# Patient Record
Sex: MALE | Race: WHITE | Employment: OTHER | URBAN - METROPOLITAN AREA
[De-identification: names, ages, dates, MRNs, and addresses within clinical notes are randomized per-mention and may not be internally consistent; named-entity substitution may affect disease eponyms.]

---

## 2017-05-11 ENCOUNTER — OFFICE VISIT (OUTPATIENT)
Dept: NEUROLOGY | Age: 82
End: 2017-05-11

## 2017-05-11 VITALS
HEIGHT: 68 IN | HEART RATE: 60 BPM | WEIGHT: 153 LBS | RESPIRATION RATE: 12 BRPM | SYSTOLIC BLOOD PRESSURE: 128 MMHG | DIASTOLIC BLOOD PRESSURE: 66 MMHG | BODY MASS INDEX: 23.19 KG/M2 | OXYGEN SATURATION: 96 %

## 2017-05-11 DIAGNOSIS — I65.23 STENOSIS OF BOTH CAROTID ARTERIES WITHOUT INFARCTION: ICD-10-CM

## 2017-05-11 DIAGNOSIS — R42 DIZZINESS AND GIDDINESS: Primary | ICD-10-CM

## 2017-05-11 DIAGNOSIS — G30.1 DEMENTIA OF THE ALZHEIMER'S TYPE, WITH LATE ONSET, UNCOMPLICATED (HCC): ICD-10-CM

## 2017-05-11 DIAGNOSIS — F02.80 DEMENTIA OF THE ALZHEIMER'S TYPE, WITH LATE ONSET, UNCOMPLICATED (HCC): ICD-10-CM

## 2017-05-11 DIAGNOSIS — G45.0 VERTEBRO-BASILAR ARTERY SYNDROME: ICD-10-CM

## 2017-05-11 RX ORDER — CLOPIDOGREL BISULFATE 75 MG/1
75 TABLET ORAL DAILY
Qty: 90 TAB | Refills: 3 | Status: SHIPPED | OUTPATIENT
Start: 2017-05-11

## 2017-05-11 RX ORDER — LOPERAMIDE HYDROCHLORIDE 2 MG/1
CAPSULE ORAL
Status: ON HOLD | COMMUNITY
Start: 2017-05-08 | End: 2018-01-01

## 2017-05-11 RX ORDER — DONEPEZIL HYDROCHLORIDE 10 MG/1
10 TABLET, FILM COATED ORAL
Qty: 90 TAB | Refills: 3 | Status: SHIPPED | OUTPATIENT
Start: 2017-05-11

## 2017-05-11 RX ORDER — LOPERAMIDE HCL 2 MG
2 TABLET ORAL
COMMUNITY

## 2017-05-11 RX ORDER — ALPRAZOLAM 0.5 MG/1
0.5 TABLET ORAL
Status: ON HOLD | COMMUNITY
End: 2018-01-01

## 2017-05-11 RX ORDER — GABAPENTIN 600 MG/1
600 TABLET ORAL 4 TIMES DAILY
Qty: 360 TAB | Refills: 3 | Status: SHIPPED | OUTPATIENT
Start: 2017-05-11 | End: 2018-01-01 | Stop reason: SDUPTHER

## 2017-05-11 NOTE — PATIENT INSTRUCTIONS

## 2017-05-11 NOTE — LETTER
5/11/2017 3:24 PM 
 
Patient:  Linda Landers YOB: 1931 Date of Visit: 5/11/2017 Dear No Recipients: Thank you for referring Mr. Linda Landers to me for evaluation/treatment. Below are the relevant portions of my assessment and plan of care. Consult Subjective:  
 
Linda Landers is a 80 y.o. right hand  male seen for evaluation of problem of dizziness and spells of loss of balance worse when bending over and increasing pressure sensation in the bifrontal head regions and back of the head and memory loss. Patient nor his wife thinks his mental status has worsened and he seems to be stable. He has chronic ataxia, that has not really changed either. He was last seen a year and a half ago and returns now for medication refills. Patient is on Aricept and his wife says he is doing well on that medication and does not need any new medications. His memory loss does not seem to be progressing at this time. His dizziness is better but still persistent and he needs a cane to ambulate outside the houseKelly Rodriguez His carotid Doppler studies done in June 2015 showed no significant blockages and was normal for age. Patient seems to be doing well and is remaining mentally and physically active. He's had no other new neurological symptoms. He has a history of mini strokes according to the wife, with these being found on an abnormal MRI in the past under the care of his previous neurologist. Patient has not had any clear focal weakness, sensory loss, gait problems, visual difficulty or strokelike symptoms. We checked a carotid Doppler, sedimentation rate to rule out temporal arteritis, and an EEG because of headaches and memory loss and they were okay. Patient is on Aricept 10 memory, and not anxious to increase his medications. Patient also seen for numbness and weakness in his legs.  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 He has persistent numbness and tingling and burning pain in his feet, and slight difficulty with weakness and balance problems, and uses a cane for ambulation and has had no progression of symptoms since his last visit a year ago. Recommended he continue multivitamins and vitamin D on a regular basis. He had a history of TIA and stroke in the past. He had no other new neurological symptoms, no stroke like symptoms, no new numbness or weakness or visual problems or new gait problems, or problems with his bowel or bladder. He takes Neurontin 600 mg 3-4 times a day for his neuropathic pain. Complete review of systems and symptoms he has had no new medical problems, complications or illnesses. He has a past history of dementia, and Guillain-[place] syndrome x2, TIA, anxiety and depression, GERD. Past Medical History:  
Diagnosis Date  Cancer ([facility] 2005  
 throat Cancer; laser surgery, no chemo, 30 radiation Tx, breast ca  Chronic kidney disease   
 stage IV:   Dr Darryl Ulrich ([phone])  [facility]  Dysphagia 8/11/2015  GERD (gastroesophageal reflux disease)  [facility]  
 as of 8/3/15 pt has aching/weakness in legs: Dr Robbin Stewart History of heart attack 1981 Dr Brijesh GAYTAN (hard of hearing) doesn't wear hearing aides  Hypercholesteremia  Hypertension  Personal history of colonic polyps 8/11/2015  Senile dementia Dr Vida Grider Past Surgical History:  
Procedure Laterality Date  BREAST SURGERY PROCEDURE UNLISTED    
 removed breast cancer  COLONOSCOPY N/A 11/10/2016 COLON[facility]COPY performed by Adri [facility] at [facility] ENDOSCOPY  COLONOSCOPY,DIAGNOSTIC  8/11/2015  COLONOSCOPY,DIAGNOSTIC  11/10/2016  COLONOSCOPY,YAIR HUNTER,SNARE  8/11/2015  HX APPENDECTOMY  HX OTHER SURGICAL  2005  
 throat cancer removal  
  HX OTHER SURGICAL  2005  
 radiation treatments Family History Problem Relation Age of Onset  Heart Disease Mother  Other Father   
  blood poisoning  Heart Disease Sister  Cancer Sister   
  breast  
 Cancer Child   
  breast  
 High Cholesterol Child  Heart defect Child Social History Substance Use Topics  Smoking status: Former Smoker Packs/day: 1.50 Years: 50.00 Quit date: 7/1/2004  Smokeless tobacco: Never Used  Alcohol use No  
   
Current Outpatient Prescriptions Medication Sig Dispense Refill  loperamide (IMODIUM) 2 mg capsule  loperamide (IMMODIUM) 2 mg tablet Take 2 mg by mouth four (4) times daily as needed for Diarrhea.  ALPRAZolam (XANAX) 0.5 mg tablet Take 0.5 mg by mouth three (3) times daily as needed for Anxiety.  donepezil (ARICEPT) 10 mg tablet Take 1 Tab by mouth nightly. 90 Tab 3  clopidogrel (PLAVIX) 75 mg tab Take 1 Tab by mouth daily. 90 Tab 3  
 gabapentin (NEURONTIN) 600 mg tablet Take 1 Tab by mouth four (4) times daily. 360 Tab 3  
 atorvastatin (LIPITOR) 20 mg tablet Take  by mouth daily.  sertraline (ZOLOFT) 25 mg tablet Take 25 mg by mouth nightly.  omeprazole (PRILOSEC) 20 mg capsule Take 20 mg by mouth daily (after lunch).  furosemide (LASIX) 40 mg tablet Take  by mouth two (2) times a day.  potassium chloride SR (KLOR-CON 10) 10 mEq tablet Take 10 mEq by mouth two (2) times a day.  desloratadine (CLARINEX) 5 mg tablet Take 5 mg by mouth daily. Allergies Allergen Reactions  Valium [Diazepam] Other (comments) Review of Systems: A comprehensive review of systems was negative except for: Constitutional: positive for fatigue and malaise Gastrointestinal: positive for dyspepsia and reflux symptoms Musculoskeletal: positive for myalgias, arthralgias, stiff joints and muscle weakness Neurological: positive for memory problems, paresthesia, coordination problems, gait problems and weakness Behvioral/Psych: positive for dementia, anxiety and depression Vitals:  
 05/11/17 1146 BP: 128/66 Pulse: 60 Resp: 12 SpO2: 96% Weight: 153 lb (69.4 kg) Height: 5' 7.5\" (1.715 m) Objective: I 
 
 
NEUROLOGICAL EXAM: 
 
Appearance: The patient is well developed, well nourished, provides a fair history and is in no acute distress. Mental Status: Oriented to year and president, place and person not day of the month, or month. Patient cannot do serial sevens or spell the word world backwards. Mood and affect appropriate. Speech is fluent Cranial Nerves:   Intact visual fields. Fundi are benign. Pupils anisocoric after cataract surgery, but do react, EOM's full, no nystagmus, no ptosis. Facial sensation is normal. Corneal reflexes are not tested. Facial movement is symmetric. Hearing is abnormal bilaterally. Palate is midline with normal sternocleidomastoid and trapezius muscles are normal. Tongue is midline. Neck without meningismus or bruits Motor:  4/5 strength in upper and lower proximal and distal muscles. Normal bulk and tone. No fasciculations. Reflexes:   Deep tendon reflexes 1+/4 and symmetrical. 
No babinski or clonus present Sensory:   Abnormal to touch, pinprick and DSS, and vibration and temperature decreased in both feet. Gait:  Abnormal gait that is slightly wide-base and unsteady, and when outside the home uses a cane outside . Tremor:   No tremor noted. Cerebellar:  Abnormal Romberg and tandem cerebellar signs present. Neurovascular:  Normal heart sounds and regular rhythm, peripheral pulses decreased in both feet, and no carotid bruits. Assessment:  
 
 
Plan:  
 
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 Patient will continue his Plavix for the strokes, his Aricept for memory loss, and his Neurontin one twice a day and 2 at night for his residual painful sensory symptoms from his Guillain-Barré Patient had unremarkable carotid Doppler exam because of the dizziness and balance, and gait and EEG and sed rate because of his memory loss and dizziness Patient advised to take a multivitamin and vitamin D on a regular basis, and continue his other medications as ordered He is encouraged to exercise regularly and remain mentally and physically active Patient condition discussed with patient and his daughter in detail today Despite the possible slight progression of memory loss, they are not anxious for more cognitive enhancing agents yet 25 minutes spent with patient reviewing his records, discussing his case with his family and patient in detail, discussing therapeutic options and medications available, and discussing prognosis and diagnosis and treatment Followup in 12 months time or earlier as needed Signed By: Liset Winter MD   
 May 11, 2017 This note will not be viewable in [address] If you have questions, please do not hesitate to call me. I look forward to following Mr. Lucia Wilde along with you. Sincerely, Liset Winter MD

## 2017-05-11 NOTE — PROGRESS NOTES
Consult    Subjective:     Nick Lozoya is a 80 y.o. right hand  male seen for evaluation of problem of dizziness and spells of loss of balance worse when bending over and increasing pressure sensation in the bifrontal head regions and back of the head and memory loss. Patient nor his wife thinks his mental status has worsened and he seems to be stable. He has chronic ataxia, that has not really changed either. He was last seen a year and a half ago and returns now for medication refills. Patient is on Aricept and his wife says he is doing well on that medication and does not need any new medications. His memory loss does not seem to be progressing at this time. His dizziness is better but still persistent and he needs a cane to ambulate outside the house. Arielle Neri His carotid Doppler studies done in June 2015 showed no significant blockages and was normal for age. Patient seems to be doing well and is remaining mentally and physically active. He's had no other new neurological symptoms. He has a history of mini strokes according to the wife, with these being found on an abnormal MRI in the past under the care of his previous neurologist. Patient has not had any clear focal weakness, sensory loss, gait problems, visual difficulty or strokelike symptoms. We checked a carotid Doppler, sedimentation rate to rule out temporal arteritis, and an EEG because of headaches and memory loss and they were okay. Patient is on Aricept 10 memory, and not anxious to increase his medications. Patient also seen for numbness and weakness in his legs. Patient had a history of Guillain-[place] syndrome in 1990 and then several years later with a recurrent spell. He has persistent numbness and tingling and burning pain in his feet, and slight difficulty with weakness and balance problems, and uses a cane for ambulation and has had no progression of symptoms since his last visit a year ago.  Recommended he continue multivitamins and vitamin D on a regular basis. He had a history of TIA and stroke in the past. He had no other new neurological symptoms, no stroke like symptoms, no new numbness or weakness or visual problems or new gait problems, or problems with his bowel or bladder. He takes Neurontin 600 mg 3-4 times a day for his neuropathic pain. Complete review of systems and symptoms he has had no new medical problems, complications or illnesses. He has a past history of dementia, and Guillain-[place] syndrome x2, TIA, anxiety and depression, GERD.     Past Medical History:   Diagnosis Date    Cancer [facility]) 2005    throat Cancer; laser surgery, no chemo, 30 radiation Tx, breast ca    Chronic kidney disease     stage IV:   Dr Shantal Marie ([phone])    [facility] disease ([facility]     Dysphagia 8/11/2015    GERD (gastroesophageal reflux disease)     Guillain-[place] ([facility] 1990    as of 8/3/15 pt has aching/weakness in legs: Dr Twila Juarez History of heart attack 1981    Dr Nasir Mathews (hard of hearing)     doesn't wear hearing aides    Hypercholesteremia     Hypertension     Personal history of colonic polyps 8/11/2015    Senile dementia     Dr Bronson Chung      Past Surgical History:   Procedure Laterality Date    BREAST SURGERY PROCEDURE UNLISTED      removed breast cancer    COLONOSCOPY N/A 11/10/2016    [facility]  8/11/2015         COLONOSCOPY,DIAGNOSTIC  11/10/2016         Mariel Kocher  8/11/2015         HX APPENDECTOMY      HX OTHER SURGICAL  2005    throat cancer removal    HX OTHER SURGICAL  2005    radiation treatments     Family History   Problem Relation Age of Onset    Heart Disease Mother     Other Father      blood poisoning    Heart Disease Sister     Cancer Sister      breast    Cancer Child      breast    High Cholesterol Child     Heart defect Child       Social History Substance Use Topics    Smoking status: Former Smoker     Packs/day: 1.50     Years: 50.00     Quit date: 7/1/2004    Smokeless tobacco: Never Used    Alcohol use No       Current Outpatient Prescriptions   Medication Sig Dispense Refill    loperamide (IMODIUM) 2 mg capsule       loperamide (IMMODIUM) 2 mg tablet Take 2 mg by mouth four (4) times daily as needed for Diarrhea.  ALPRAZolam (XANAX) 0.5 mg tablet Take 0.5 mg by mouth three (3) times daily as needed for Anxiety.  donepezil (ARICEPT) 10 mg tablet Take 1 Tab by mouth nightly. 90 Tab 3    clopidogrel (PLAVIX) 75 mg tab Take 1 Tab by mouth daily. 90 Tab 3    gabapentin (NEURONTIN) 600 mg tablet Take 1 Tab by mouth four (4) times daily. 360 Tab 3    atorvastatin (LIPITOR) 20 mg tablet Take  by mouth daily.  sertraline (ZOLOFT) 25 mg tablet Take 25 mg by mouth nightly.  omeprazole (PRILOSEC) 20 mg capsule Take 20 mg by mouth daily (after lunch).  furosemide (LASIX) 40 mg tablet Take  by mouth two (2) times a day.  potassium chloride SR (KLOR-CON 10) 10 mEq tablet Take 10 mEq by mouth two (2) times a day.  desloratadine (CLARINEX) 5 mg tablet Take 5 mg by mouth daily. Allergies   Allergen Reactions    Valium [Diazepam] Other (comments)        Review of Systems:  A comprehensive review of systems was negative except for: Constitutional: positive for fatigue and malaise  Gastrointestinal: positive for dyspepsia and reflux symptoms  Musculoskeletal: positive for myalgias, arthralgias, stiff joints and muscle weakness  Neurological: positive for memory problems, paresthesia, coordination problems, gait problems and weakness  Behvioral/Psych: positive for dementia, anxiety and depression   Vitals:    05/11/17 1146   BP: 128/66   Pulse: 60   Resp: 12   SpO2: 96%   Weight: 153 lb (69.4 kg)   Height: 5' 7.5\" (1.715 m)     Objective:     I      NEUROLOGICAL EXAM:    Appearance:   The patient is well developed, well nourished, provides a fair history and is in no acute distress. Mental Status: Oriented to year and president, place and person not day of the month, or month. Patient cannot do serial sevens or spell the word world backwards. Mood and affect appropriate. Speech is fluent   Cranial Nerves:   Intact visual fields. Fundi are benign. Pupils anisocoric after cataract surgery, but do react, EOM's full, no nystagmus, no ptosis. Facial sensation is normal. Corneal reflexes are not tested. Facial movement is symmetric. Hearing is abnormal bilaterally. Palate is midline with normal sternocleidomastoid and trapezius muscles are normal. Tongue is midline. Neck without meningismus or bruits   Motor:  4/5 strength in upper and lower proximal and distal muscles. Normal bulk and tone. No fasciculations. Reflexes:   Deep tendon reflexes 1+/4 and symmetrical.  No babinski or clonus present   Sensory:   Abnormal to touch, pinprick and DSS, and vibration and temperature decreased in both feet. Gait:  Abnormal gait that is slightly wide-base and unsteady, and when outside the home uses a cane outside . Tremor:   No tremor noted. Cerebellar:  Abnormal Romberg and tandem cerebellar signs present. Neurovascular:  Normal heart sounds and regular rhythm, peripheral pulses decreased in both feet, and no carotid bruits.            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  Patient will continue his Plavix for the strokes, his Aricept for memory loss, and his Neurontin one twice a day and 2 at night for his residual painful sensory symptoms from his Guillain-Barré  Patient had unremarkable carotid Doppler exam because of the dizziness and balance, and gait and EEG and sed rate because of his memory loss and dizziness  Patient advised to take a multivitamin and vitamin D on a regular basis, and continue his other medications as ordered  He is encouraged to exercise regularly and remain mentally and physically active  Patient condition discussed with patient and his daughter in detail today  Despite the possible slight progression of memory loss, they are not anxious for more cognitive enhancing agents yet  25 minutes spent with patient reviewing his records, discussing his case with his family and patient in detail, discussing therapeutic options and medications available, and discussing prognosis and diagnosis and treatment  Followup in 12 months time or earlier as needed    Signed By: Amie Shaw MD     May 11, 2017       This note will not be viewable in MyChart.

## 2017-05-11 NOTE — MR AVS SNAPSHOT
Visit Information Date & Time Provider Department Dept. Phone Encounter #  
 5/11/2017 11:40 AM Holly Howard MD Neurology Clinic at [facility] [phone] [id number] Follow-up Instructions Return in about 1 year (around 5/11/2018). Your Appointments 5/11/2018 11:40 AM  
Follow Up with Holly Howard MD  
Neurology Clinic at [facility] [address] [facility]) Appt Note: f/u memory, jrb 5/11/17; f/u memory, jrb 5/11/17  
 [address] [facility], 
[facility], [address] [phone]  
[address] [facility], [address] [place] [address], [address] [place] [address] [address] [phone] Upcoming Health Maintenance Date Due DTaP/Tdap/Td series (1 - Tdap) 6/2/1952 ZOSTER VACCINE AGE 60> 6/2/1991 GLAUCOMA SCREENING Q2Y 6/2/1996 Pneumococcal 65+ High/Highest Risk (1 of 2 - PCV13) 6/2/1996 MEDICARE YEARLY EXAM 6/2/1996 INFLUENZA AGE 9 TO ADULT 8/1/2017 Allergies as of 5/11/2017  Review Complete On: 5/11/2017 By: Holly Howard MD  
  
 Severity Noted Reaction Type Reactions Valium [Diazepam]  12/11/2012    Other (comments) Current Immunizations  Never Reviewed No immunizations on file. Not reviewed this visit You Were Diagnosed With   
  
 Codes Comments Dizziness and giddiness    -  Primary ICD-10-CM: V55 ICD-9-CM: 780.4 Dementia of the Alzheimer's type, with late onset, uncomplicated     PLI-08-[place]: G30.1, F02.80 ICD-9-CM: 331.0, 294.10 Stenosis of both carotid arteries without infarction     ICD-10-CM: I65.23 ICD-9-CM: 433.10, 433.30 Vertebro-basilar artery syndrome     ICD-10-CM: G45.0 ICD-9-CM: 435.3 Vitals BP Pulse Resp Height(growth percentile) Weight(growth percentile) SpO2  
 128/66 60 12 5' 7.5\" (1.715 m) 153 lb (69.4 kg) 96% BMI Smoking Status 23.61 kg/m2 Former Smoker Vitals History BMI and BSA Data Body Mass Index Body Surface Area 23.61 kg/m 2 1.82 m 2 Preferred Pharmacy Pharmacy Name Phone [address] [facility] [phone] Your Updated Medication List  
  
   
This list is accurate as of: 5/11/17  3:07 PM.  Always use your most recent med list.  
  
  
  
  
 atorvastatin 20 mg tablet Commonly known as:  LIPITOR Take  by mouth daily. CLARINEX 5 mg tablet Generic drug:  desloratadine Take 5 mg by mouth daily. clopidogrel 75 mg Tab Commonly known as:  PLAVIX Take 1 Tab by mouth daily. donepezil 10 mg tablet Commonly known as:  ARICEPT Take 1 Tab by mouth nightly.  
  
 gabapentin 600 mg tablet Commonly known as:  NEURONTIN Take 1 Tab by mouth four (4) times daily. LASIX 40 mg tablet Generic drug:  furosemide Take  by mouth two (2) times a day. * loperamide 2 mg tablet Commonly known as:  IMMODIUM Take 2 mg by mouth four (4) times daily as needed for Diarrhea. * loperamide 2 mg capsule Commonly known as:  IMODIUM  
  
 omeprazole 20 mg capsule Commonly known as:  PRILOSEC Take 20 mg by mouth daily (after lunch). potassium chloride SR 10 mEq tablet Commonly known as:  KLOR-CON 10 Take 10 mEq by mouth two (2) times a day. sertraline 25 mg tablet Commonly known as:  ZOLOFT Take 25 mg by mouth nightly. XANAX 0.5 mg tablet Generic drug:  ALPRAZolam  
Take 0.5 mg by mouth three (3) times daily as needed for Anxiety. * Notice: This list has 2 medication(s) that are the same as other medications prescribed for you. Read the directions carefully, and ask your doctor or other care provider to review them with you. Prescriptions Printed Refills  
 donepezil (ARICEPT) 10 mg tablet 3 Sig: Take 1 Tab by mouth nightly. Class: Print Route: Oral  
 clopidogrel (PLAVIX) 75 mg tab 3 Sig: Take 1 Tab by mouth daily. Class: Print  Route: Oral  
 gabapentin (NEURONTIN) 600 mg tablet 3 Sig: Take 1 Tab by mouth four (4) times daily. Class: Print Route: Oral  
  
Follow-up Instructions Return in about 1 year (around 5/11/2018). Patient Instructions A Healthy Lifestyle: Care Instructions Your Care Instructions A healthy lifestyle can help you feel good, stay at a healthy weight, and have plenty of energy for both work and play. A healthy lifestyle is something you can share with your whole family. A healthy lifestyle also can lower your risk for serious health problems, such as high blood pressure, heart disease, and diabetes. You can follow a few steps listed below to improve your health and the health of your family. Follow-up care is a key part of your treatment and safety. Be sure to make and go to all appointments, and call your doctor if you are having problems. Its also a good idea to know your test results and keep a list of the medicines you take. How can you care for yourself at home? · Do not eat too much sugar, fat, or fast foods. You can still have dessert and treats now and then. The goal is moderation. · Start small to improve your eating habits. Pay attention to portion sizes, drink less juice and soda pop, and eat more fruits and vegetables. ¨ Eat a healthy amount of food. A 3-ounce serving of meat, for example, is about the size of a deck of cards. Fill the rest of your plate with vegetables and whole grains. ¨ Limit the amount of soda and sports drinks you have every day. Drink more water when you are thirsty. ¨ Eat at least 5 servings of fruits and vegetables every day. It may seem like a lot, but it is not hard to reach this goal. A serving or helping is 1 piece of fruit, 1 cup of vegetables, or 2 cups of leafy, raw vegetables. Have an apple or some carrot sticks as an afternoon snack instead of a candy bar.  Try to have fruits and/or vegetables at every meal. 
 · Make exercise part of your daily routine. You may want to start with simple activities, such as walking, bicycling, or slow swimming. Try to be active 30 to 60 minutes every day. You do not need to do all 30 to 60 minutes all at once. For example, you can exercise 3 times a day for 10 or 20 minutes. Moderate exercise is safe for most people, but it is always a good idea to talk to your doctor before starting an exercise program. 
· Keep moving. Sandre Cantrell the lawn, work in the garden, or [employer]. Take the stairs instead of the elevator at work. · If you smoke, quit. People who smoke have an increased risk for heart attack, stroke, cancer, and other lung illnesses. Quitting is hard, but there are ways to boost your chance of quitting tobacco for good. ¨ Use nicotine gum, patches, or lozenges. ¨ Ask your doctor about stop-smoking programs and medicines. ¨ Keep trying. In addition to reducing your risk of diseases in the future, you will notice some benefits soon after you stop using tobacco. If you have shortness of breath or asthma symptoms, they will likely get better within a few weeks after you quit. · Limit how much alcohol you drink. Moderate amounts of alcohol (up to 2 drinks a day for men, 1 drink a day for women) are okay. But drinking too much can lead to liver problems, high blood pressure, and other health problems. Family health If you have a family, there are many things you can do together to improve your health. · Eat meals together as a family as often as possible. · Eat healthy foods. This includes fruits, vegetables, lean meats and dairy, and whole grains. · Include your family in your fitness plan. Most people think of activities such as jogging or tennis as the way to fitness, but there are many ways you and your family can be more active. Anything that makes you breathe hard and gets your heart pumping is exercise. Here are some tips: ¨ Walk to do errands or to take your child to school or the bus. ¨ Go for a family bike ride after dinner instead of watching TV. Where can you learn more? Go to http://rosario-felecia.info/. Enter G272 in the search box to learn more about \"A Healthy Lifestyle: Care Instructions. \" Current as of: July 26, 2016 Content Version: 11.2 © 4671-4942 [employer]. Care instructions adapted under license by [employer] (which disclaims liability or warranty for this information). If you have questions about a medical condition or this instruction, always ask your healthcare professional. [address] any warranty or liability for your use of this information. Introducing [facility]! [facility] introduces [employer] patient portal. Now you can access parts of your medical record, email your doctor's office, and request medication refills online. 1. In your internet browser, go to [email or web address] 2. Click on the First Time User? Click Here link in the Sign In box. You will see the New Member Sign Up page. 3. Enter your [employer] Access Code exactly as it appears below. You will not need to use this code after youve completed the sign-up process. If you do not sign up before the expiration date, you must request a new code. · [employer] Access Code: [email or web address] Expires: 8/9/2017 11:25 AM 
 
4. Enter the last four digits of your Social Security Number (xxxx) and Date of Birth (mm/dd/yyyy) as indicated and click Submit. You will be taken to the next sign-up page. 5. Create a [employer] ID. This will be your [employer] login ID and cannot be changed, so think of one that is secure and easy to remember. 6. Create a [employer] password. You can change your password at any time. 7. Enter your Password Reset Question and Answer. This can be used at a later time if you forget your password. 8. Enter your e-mail address. You will receive e-mail notification when new information is available in [address] 9. Click Sign Up. You can now view and download portions of your medical record. 10. Click the Download Summary menu link to download a portable copy of your medical information. If you have questions, please visit the Frequently Asked Questions section of the healthfinch website. Remember, [employer]finch is NOT to be used for urgent needs. For medical emergencies, dial 911. Now available from your iPhone and Android! Please provide this summary of care documentation to your next provider. Your primary care clinician is listed as Aminata Salvador. If you have any questions after today's visit, please call [phone].

## 2018-01-01 ENCOUNTER — OFFICE VISIT (OUTPATIENT)
Dept: NEUROLOGY | Age: 83
End: 2018-01-01

## 2018-01-01 ENCOUNTER — HOSPITAL ENCOUNTER (OUTPATIENT)
Dept: MRI IMAGING | Age: 83
Discharge: HOME | End: 2018-06-07
Attending: PSYCHIATRY & NEUROLOGY
Payer: MEDICARE

## 2018-01-01 ENCOUNTER — APPOINTMENT (OUTPATIENT)
Dept: GENERAL RADIOLOGY | Age: 83
DRG: 260 | End: 2018-01-01
Attending: INTERNAL MEDICINE
Payer: MEDICARE

## 2018-01-01 ENCOUNTER — APPOINTMENT (OUTPATIENT)
Dept: GENERAL RADIOLOGY | Age: 83
DRG: 260 | End: 2018-01-01
Attending: GENERAL ACUTE CARE HOSPITAL
Payer: MEDICARE

## 2018-01-01 ENCOUNTER — APPOINTMENT (OUTPATIENT)
Dept: GENERAL RADIOLOGY | Age: 83
DRG: 260 | End: 2018-01-01
Attending: EMERGENCY MEDICINE
Payer: MEDICARE

## 2018-01-01 ENCOUNTER — HOSPITAL ENCOUNTER (OUTPATIENT)
Dept: CARDIAC CATH/INVASIVE PROCEDURES | Age: 83
Setting detail: OBSERVATION
Discharge: HOME | End: 2018-07-13
Attending: INTERNAL MEDICINE | Admitting: INTERNAL MEDICINE
Payer: MEDICARE

## 2018-01-01 ENCOUNTER — TELEPHONE (OUTPATIENT)
Dept: NEUROLOGY | Age: 83
End: 2018-01-01

## 2018-01-01 ENCOUNTER — APPOINTMENT (OUTPATIENT)
Dept: GENERAL RADIOLOGY | Age: 83
End: 2018-01-01
Attending: INTERNAL MEDICINE
Payer: MEDICARE

## 2018-01-01 ENCOUNTER — HOSPITAL ENCOUNTER (INPATIENT)
Age: 83
LOS: 15 days | Discharge: NURSING, REHAB OR LONG-TERM CARE | DRG: 260 | End: 2018-08-11
Attending: EMERGENCY MEDICINE | Admitting: INTERNAL MEDICINE
Payer: MEDICARE

## 2018-01-01 ENCOUNTER — APPOINTMENT (OUTPATIENT)
Dept: NUCLEAR MEDICINE | Age: 83
DRG: 260 | End: 2018-01-01
Attending: EMERGENCY MEDICINE
Payer: MEDICARE

## 2018-01-01 VITALS
TEMPERATURE: 98 F | TEMPERATURE: 98.6 F | OXYGEN SATURATION: 98 % | SYSTOLIC BLOOD PRESSURE: 99 MMHG | DIASTOLIC BLOOD PRESSURE: 60 MMHG | HEIGHT: 67 IN | WEIGHT: 160 LBS | RESPIRATION RATE: 18 BRPM | DIASTOLIC BLOOD PRESSURE: 80 MMHG | HEART RATE: 121 BPM | WEIGHT: 146 LBS | HEIGHT: 66 IN | SYSTOLIC BLOOD PRESSURE: 140 MMHG | BODY MASS INDEX: 23.46 KG/M2 | RESPIRATION RATE: 12 BRPM | HEART RATE: 75 BPM | OXYGEN SATURATION: 95 % | BODY MASS INDEX: 25.11 KG/M2

## 2018-01-01 VITALS
OXYGEN SATURATION: 93 % | WEIGHT: 139.6 LBS | HEIGHT: 66 IN | TEMPERATURE: 98.5 F | DIASTOLIC BLOOD PRESSURE: 69 MMHG | BODY MASS INDEX: 22.43 KG/M2 | SYSTOLIC BLOOD PRESSURE: 124 MMHG | HEART RATE: 72 BPM | RESPIRATION RATE: 18 BRPM

## 2018-01-01 DIAGNOSIS — R42 DIZZINESS AND GIDDINESS: ICD-10-CM

## 2018-01-01 DIAGNOSIS — I65.23 STENOSIS OF BOTH CAROTID ARTERIES WITHOUT INFARCTION: ICD-10-CM

## 2018-01-01 DIAGNOSIS — G30.1 DEMENTIA OF THE ALZHEIMER'S TYPE, WITH LATE ONSET, UNCOMPLICATED (HCC): ICD-10-CM

## 2018-01-01 DIAGNOSIS — R42 DIZZINESS AND GIDDINESS: Primary | ICD-10-CM

## 2018-01-01 DIAGNOSIS — F02.80 DEMENTIA OF THE ALZHEIMER'S TYPE, WITH LATE ONSET, UNCOMPLICATED (HCC): ICD-10-CM

## 2018-01-01 DIAGNOSIS — F02.80 DEMENTIA OF THE ALZHEIMER'S TYPE, WITH LATE ONSET, UNCOMPLICATED (HCC): Primary | ICD-10-CM

## 2018-01-01 DIAGNOSIS — G45.0 VERTEBRO-BASILAR ARTERY SYNDROME: ICD-10-CM

## 2018-01-01 DIAGNOSIS — R09.02 HYPOXIA: Primary | ICD-10-CM

## 2018-01-01 DIAGNOSIS — G30.1 DEMENTIA OF THE ALZHEIMER'S TYPE, WITH LATE ONSET, UNCOMPLICATED (HCC): Primary | ICD-10-CM

## 2018-01-01 LAB
ANION GAP SERPL CALC-SCNC: 10 MMOL/L (ref 5–15)
ANION GAP SERPL CALC-SCNC: 5 MMOL/L (ref 5–15)
ANION GAP SERPL CALC-SCNC: 6 MMOL/L (ref 5–15)
ANION GAP SERPL CALC-SCNC: 7 MMOL/L (ref 5–15)
ANION GAP SERPL CALC-SCNC: 8 MMOL/L (ref 5–15)
ATRIAL RATE: 104 BPM
ATRIAL RATE: 220 BPM
ATRIAL RATE: 256 BPM
BACTERIA SPEC CULT: NORMAL
BASOPHILS # BLD: 0 K/UL (ref 0–0.1)
BASOPHILS # BLD: 0.1 K/UL (ref 0–0.1)
BASOPHILS NFR BLD: 0 % (ref 0–1)
BASOPHILS NFR BLD: 1 % (ref 0–1)
BUN SERPL-MCNC: 23 MG/DL (ref 6–20)
BUN SERPL-MCNC: 25 MG/DL (ref 6–20)
BUN SERPL-MCNC: 26 MG/DL (ref 6–20)
BUN SERPL-MCNC: 26 MG/DL (ref 6–20)
BUN SERPL-MCNC: 27 MG/DL (ref 6–20)
BUN SERPL-MCNC: 28 MG/DL (ref 6–20)
BUN SERPL-MCNC: 30 MG/DL (ref 6–20)
BUN SERPL-MCNC: 30 MG/DL (ref 6–20)
BUN SERPL-MCNC: 35 MG/DL (ref 6–20)
BUN SERPL-MCNC: 36 MG/DL (ref 6–20)
BUN SERPL-MCNC: 38 MG/DL (ref 6–20)
BUN SERPL-MCNC: 38 MG/DL (ref 6–20)
BUN SERPL-MCNC: 40 MG/DL (ref 6–20)
BUN SERPL-MCNC: 41 MG/DL (ref 6–20)
BUN/CREAT SERPL: 10 (ref 12–20)
BUN/CREAT SERPL: 11 (ref 12–20)
BUN/CREAT SERPL: 12 (ref 12–20)
BUN/CREAT SERPL: 13 (ref 12–20)
BUN/CREAT SERPL: 14 (ref 12–20)
BUN/CREAT SERPL: 9 (ref 12–20)
C DIFF TOX GENS STL QL NAA+PROBE: POSITIVE
CALCIUM SERPL-MCNC: 8.2 MG/DL (ref 8.5–10.1)
CALCIUM SERPL-MCNC: 8.7 MG/DL (ref 8.5–10.1)
CALCIUM SERPL-MCNC: 8.8 MG/DL (ref 8.5–10.1)
CALCIUM SERPL-MCNC: 8.9 MG/DL (ref 8.5–10.1)
CALCIUM SERPL-MCNC: 8.9 MG/DL (ref 8.5–10.1)
CALCIUM SERPL-MCNC: 9 MG/DL (ref 8.5–10.1)
CALCIUM SERPL-MCNC: 9.1 MG/DL (ref 8.5–10.1)
CALCIUM SERPL-MCNC: 9.3 MG/DL (ref 8.5–10.1)
CALCULATED P AXIS, ECG09: -8 DEGREES
CALCULATED R AXIS, ECG10: -14 DEGREES
CALCULATED R AXIS, ECG10: -18 DEGREES
CALCULATED R AXIS, ECG10: 12 DEGREES
CALCULATED T AXIS, ECG11: 10 DEGREES
CALCULATED T AXIS, ECG11: 23 DEGREES
CALCULATED T AXIS, ECG11: 61 DEGREES
CHLORIDE SERPL-SCNC: 106 MMOL/L (ref 97–108)
CHLORIDE SERPL-SCNC: 107 MMOL/L (ref 97–108)
CHLORIDE SERPL-SCNC: 108 MMOL/L (ref 97–108)
CHLORIDE SERPL-SCNC: 108 MMOL/L (ref 97–108)
CHLORIDE SERPL-SCNC: 110 MMOL/L (ref 97–108)
CHLORIDE SERPL-SCNC: 112 MMOL/L (ref 97–108)
CHLORIDE SERPL-SCNC: 114 MMOL/L (ref 97–108)
CHLORIDE SERPL-SCNC: 114 MMOL/L (ref 97–108)
CHLORIDE SERPL-SCNC: 116 MMOL/L (ref 97–108)
CO2 SERPL-SCNC: 21 MMOL/L (ref 21–32)
CO2 SERPL-SCNC: 22 MMOL/L (ref 21–32)
CO2 SERPL-SCNC: 25 MMOL/L (ref 21–32)
CO2 SERPL-SCNC: 26 MMOL/L (ref 21–32)
CO2 SERPL-SCNC: 26 MMOL/L (ref 21–32)
CO2 SERPL-SCNC: 27 MMOL/L (ref 21–32)
CO2 SERPL-SCNC: 28 MMOL/L (ref 21–32)
CO2 SERPL-SCNC: 28 MMOL/L (ref 21–32)
CO2 SERPL-SCNC: 29 MMOL/L (ref 21–32)
CO2 SERPL-SCNC: 30 MMOL/L (ref 21–32)
CREAT SERPL-MCNC: 2.17 MG/DL (ref 0.7–1.3)
CREAT SERPL-MCNC: 2.26 MG/DL (ref 0.7–1.3)
CREAT SERPL-MCNC: 2.31 MG/DL (ref 0.7–1.3)
CREAT SERPL-MCNC: 2.43 MG/DL (ref 0.7–1.3)
CREAT SERPL-MCNC: 2.45 MG/DL (ref 0.7–1.3)
CREAT SERPL-MCNC: 2.49 MG/DL (ref 0.7–1.3)
CREAT SERPL-MCNC: 2.49 MG/DL (ref 0.7–1.3)
CREAT SERPL-MCNC: 2.57 MG/DL (ref 0.7–1.3)
CREAT SERPL-MCNC: 2.68 MG/DL (ref 0.7–1.3)
CREAT SERPL-MCNC: 2.86 MG/DL (ref 0.7–1.3)
CREAT SERPL-MCNC: 2.92 MG/DL (ref 0.7–1.3)
CREAT SERPL-MCNC: 2.94 MG/DL (ref 0.7–1.3)
CREAT SERPL-MCNC: 3.28 MG/DL (ref 0.7–1.3)
CREAT SERPL-MCNC: 3.6 MG/DL (ref 0.7–1.3)
D DIMER PPP FEU-MCNC: 5.69 MG/L FEU (ref 0–0.65)
DIAGNOSIS, 93000: NORMAL
DIFFERENTIAL METHOD BLD: ABNORMAL
EOSINOPHIL # BLD: 0 K/UL (ref 0–0.4)
EOSINOPHIL # BLD: 0.1 K/UL (ref 0–0.4)
EOSINOPHIL # BLD: 0.2 K/UL (ref 0–0.4)
EOSINOPHIL # BLD: 0.4 K/UL (ref 0–0.4)
EOSINOPHIL # BLD: 0.5 K/UL (ref 0–0.4)
EOSINOPHIL # BLD: 0.5 K/UL (ref 0–0.4)
EOSINOPHIL # BLD: 0.6 K/UL (ref 0–0.4)
EOSINOPHIL # BLD: 0.6 K/UL (ref 0–0.4)
EOSINOPHIL # BLD: 0.7 K/UL (ref 0–0.4)
EOSINOPHIL # BLD: 0.7 K/UL (ref 0–0.4)
EOSINOPHIL NFR BLD: 0 % (ref 0–7)
EOSINOPHIL NFR BLD: 1 % (ref 0–7)
EOSINOPHIL NFR BLD: 2 % (ref 0–7)
EOSINOPHIL NFR BLD: 3 % (ref 0–7)
EOSINOPHIL NFR BLD: 4 % (ref 0–7)
EOSINOPHIL NFR BLD: 5 % (ref 0–7)
ERYTHROCYTE [DISTWIDTH] IN BLOOD BY AUTOMATED COUNT: 26.8 % (ref 11.5–14.5)
ERYTHROCYTE [DISTWIDTH] IN BLOOD BY AUTOMATED COUNT: 26.9 % (ref 11.5–14.5)
ERYTHROCYTE [DISTWIDTH] IN BLOOD BY AUTOMATED COUNT: 27.1 % (ref 11.5–14.5)
ERYTHROCYTE [DISTWIDTH] IN BLOOD BY AUTOMATED COUNT: 27.4 % (ref 11.5–14.5)
ERYTHROCYTE [DISTWIDTH] IN BLOOD BY AUTOMATED COUNT: 27.8 % (ref 11.5–14.5)
ERYTHROCYTE [DISTWIDTH] IN BLOOD BY AUTOMATED COUNT: 27.9 % (ref 11.5–14.5)
ERYTHROCYTE [DISTWIDTH] IN BLOOD BY AUTOMATED COUNT: 28 % (ref 11.5–14.5)
ERYTHROCYTE [DISTWIDTH] IN BLOOD BY AUTOMATED COUNT: 28.9 % (ref 11.5–14.5)
ERYTHROCYTE [DISTWIDTH] IN BLOOD BY AUTOMATED COUNT: 28.9 % (ref 11.5–14.5)
ERYTHROCYTE [DISTWIDTH] IN BLOOD BY AUTOMATED COUNT: 29 % (ref 11.5–14.5)
ERYTHROCYTE [DISTWIDTH] IN BLOOD BY AUTOMATED COUNT: 29 % (ref 11.5–14.5)
ERYTHROCYTE [DISTWIDTH] IN BLOOD BY AUTOMATED COUNT: 29.4 % (ref 11.5–14.5)
GABAPENTIN SERPLBLD-MCNC: 45.2 UG/ML (ref 4–16)
GLUCOSE BLD STRIP.AUTO-MCNC: 115 MG/DL (ref 65–100)
GLUCOSE BLD STRIP.AUTO-MCNC: 128 MG/DL (ref 65–100)
GLUCOSE BLD STRIP.AUTO-MCNC: 131 MG/DL (ref 65–100)
GLUCOSE BLD STRIP.AUTO-MCNC: 132 MG/DL (ref 65–100)
GLUCOSE BLD STRIP.AUTO-MCNC: 77 MG/DL (ref 65–100)
GLUCOSE BLD STRIP.AUTO-MCNC: 81 MG/DL (ref 65–100)
GLUCOSE BLD STRIP.AUTO-MCNC: 93 MG/DL (ref 65–100)
GLUCOSE BLD STRIP.AUTO-MCNC: 97 MG/DL (ref 65–100)
GLUCOSE BLD STRIP.AUTO-MCNC: 99 MG/DL (ref 65–100)
GLUCOSE SERPL-MCNC: 103 MG/DL (ref 65–100)
GLUCOSE SERPL-MCNC: 107 MG/DL (ref 65–100)
GLUCOSE SERPL-MCNC: 78 MG/DL (ref 65–100)
GLUCOSE SERPL-MCNC: 79 MG/DL (ref 65–100)
GLUCOSE SERPL-MCNC: 82 MG/DL (ref 65–100)
GLUCOSE SERPL-MCNC: 82 MG/DL (ref 65–100)
GLUCOSE SERPL-MCNC: 83 MG/DL (ref 65–100)
GLUCOSE SERPL-MCNC: 87 MG/DL (ref 65–100)
GLUCOSE SERPL-MCNC: 88 MG/DL (ref 65–100)
GLUCOSE SERPL-MCNC: 90 MG/DL (ref 65–100)
GLUCOSE SERPL-MCNC: 92 MG/DL (ref 65–100)
GLUCOSE SERPL-MCNC: 95 MG/DL (ref 65–100)
HCT VFR BLD AUTO: 30.3 % (ref 36.6–50.3)
HCT VFR BLD AUTO: 32.1 % (ref 36.6–50.3)
HCT VFR BLD AUTO: 33 % (ref 36.6–50.3)
HCT VFR BLD AUTO: 33.2 % (ref 36.6–50.3)
HCT VFR BLD AUTO: 33.7 % (ref 36.6–50.3)
HCT VFR BLD AUTO: 33.9 % (ref 36.6–50.3)
HCT VFR BLD AUTO: 34 % (ref 36.6–50.3)
HCT VFR BLD AUTO: 34.4 % (ref 36.6–50.3)
HCT VFR BLD AUTO: 34.5 % (ref 36.6–50.3)
HCT VFR BLD AUTO: 34.6 % (ref 36.6–50.3)
HCT VFR BLD AUTO: 35 % (ref 36.6–50.3)
HCT VFR BLD AUTO: 35.1 % (ref 36.6–50.3)
HCT VFR BLD AUTO: 36 % (ref 36.6–50.3)
HEMOCCULT STL QL: NEGATIVE
HEMOCCULT STL QL: POSITIVE
HGB BLD-MCNC: 10.1 G/DL (ref 12.1–17)
HGB BLD-MCNC: 10.2 G/DL (ref 12.1–17)
HGB BLD-MCNC: 8.9 G/DL (ref 12.1–17)
HGB BLD-MCNC: 9.2 G/DL (ref 12.1–17)
HGB BLD-MCNC: 9.5 G/DL (ref 12.1–17)
HGB BLD-MCNC: 9.6 G/DL (ref 12.1–17)
HGB BLD-MCNC: 9.7 G/DL (ref 12.1–17)
HGB BLD-MCNC: 9.8 G/DL (ref 12.1–17)
HGB BLD-MCNC: 9.8 G/DL (ref 12.1–17)
HGB BLD-MCNC: 9.9 G/DL (ref 12.1–17)
HGB BLD-MCNC: 9.9 G/DL (ref 12.1–17)
IMM GRANULOCYTES # BLD: 0 K/UL (ref 0–0.04)
IMM GRANULOCYTES # BLD: 0 K/UL (ref 0–0.04)
IMM GRANULOCYTES # BLD: 0.1 K/UL (ref 0–0.04)
IMM GRANULOCYTES NFR BLD AUTO: 0 % (ref 0–0.5)
IMM GRANULOCYTES NFR BLD AUTO: 0 % (ref 0–0.5)
IMM GRANULOCYTES NFR BLD AUTO: 1 % (ref 0–0.5)
INR PPP: 1.2 (ref 0.9–1.1)
LACTATE SERPL-SCNC: 1.1 MMOL/L (ref 0.4–2)
LYMPHOCYTES # BLD: 1.1 K/UL (ref 0.8–3.5)
LYMPHOCYTES # BLD: 1.1 K/UL (ref 0.8–3.5)
LYMPHOCYTES # BLD: 1.2 K/UL (ref 0.8–3.5)
LYMPHOCYTES # BLD: 1.3 K/UL (ref 0.8–3.5)
LYMPHOCYTES # BLD: 1.4 K/UL (ref 0.8–3.5)
LYMPHOCYTES # BLD: 1.5 K/UL (ref 0.8–3.5)
LYMPHOCYTES # BLD: 1.6 K/UL (ref 0.8–3.5)
LYMPHOCYTES # BLD: 1.6 K/UL (ref 0.8–3.5)
LYMPHOCYTES # BLD: 1.7 K/UL (ref 0.8–3.5)
LYMPHOCYTES # BLD: 2.2 K/UL (ref 0.8–3.5)
LYMPHOCYTES NFR BLD: 10 % (ref 12–49)
LYMPHOCYTES NFR BLD: 11 % (ref 12–49)
LYMPHOCYTES NFR BLD: 11 % (ref 12–49)
LYMPHOCYTES NFR BLD: 12 % (ref 12–49)
LYMPHOCYTES NFR BLD: 13 % (ref 12–49)
LYMPHOCYTES NFR BLD: 13 % (ref 12–49)
LYMPHOCYTES NFR BLD: 16 % (ref 12–49)
LYMPHOCYTES NFR BLD: 8 % (ref 12–49)
LYMPHOCYTES NFR BLD: 9 % (ref 12–49)
MAGNESIUM SERPL-MCNC: 1.9 MG/DL (ref 1.6–2.4)
MAGNESIUM SERPL-MCNC: 2 MG/DL (ref 1.6–2.4)
MAGNESIUM SERPL-MCNC: 2.1 MG/DL (ref 1.6–2.4)
MCH RBC QN AUTO: 23 PG (ref 26–34)
MCH RBC QN AUTO: 23.1 PG (ref 26–34)
MCH RBC QN AUTO: 23.2 PG (ref 26–34)
MCH RBC QN AUTO: 23.2 PG (ref 26–34)
MCH RBC QN AUTO: 23.3 PG (ref 26–34)
MCH RBC QN AUTO: 23.4 PG (ref 26–34)
MCH RBC QN AUTO: 23.4 PG (ref 26–34)
MCH RBC QN AUTO: 23.5 PG (ref 26–34)
MCHC RBC AUTO-ENTMCNC: 27.7 G/DL (ref 30–36.5)
MCHC RBC AUTO-ENTMCNC: 28.3 G/DL (ref 30–36.5)
MCHC RBC AUTO-ENTMCNC: 28.6 G/DL (ref 30–36.5)
MCHC RBC AUTO-ENTMCNC: 28.7 G/DL (ref 30–36.5)
MCHC RBC AUTO-ENTMCNC: 28.7 G/DL (ref 30–36.5)
MCHC RBC AUTO-ENTMCNC: 28.8 G/DL (ref 30–36.5)
MCHC RBC AUTO-ENTMCNC: 28.9 G/DL (ref 30–36.5)
MCHC RBC AUTO-ENTMCNC: 29.4 G/DL (ref 30–36.5)
MCV RBC AUTO: 79.5 FL (ref 80–99)
MCV RBC AUTO: 79.9 FL (ref 80–99)
MCV RBC AUTO: 79.9 FL (ref 80–99)
MCV RBC AUTO: 80 FL (ref 80–99)
MCV RBC AUTO: 80.2 FL (ref 80–99)
MCV RBC AUTO: 80.2 FL (ref 80–99)
MCV RBC AUTO: 80.9 FL (ref 80–99)
MCV RBC AUTO: 81.3 FL (ref 80–99)
MCV RBC AUTO: 81.4 FL (ref 80–99)
MCV RBC AUTO: 81.9 FL (ref 80–99)
MCV RBC AUTO: 82 FL (ref 80–99)
MCV RBC AUTO: 82.9 FL (ref 80–99)
MONOCYTES # BLD: 0.7 K/UL (ref 0–1)
MONOCYTES # BLD: 0.8 K/UL (ref 0–1)
MONOCYTES # BLD: 0.9 K/UL (ref 0–1)
MONOCYTES # BLD: 1 K/UL (ref 0–1)
MONOCYTES # BLD: 1 K/UL (ref 0–1)
MONOCYTES # BLD: 1.1 K/UL (ref 0–1)
MONOCYTES # BLD: 1.1 K/UL (ref 0–1)
MONOCYTES # BLD: 1.2 K/UL (ref 0–1)
MONOCYTES NFR BLD: 10 % (ref 5–13)
MONOCYTES NFR BLD: 10 % (ref 5–13)
MONOCYTES NFR BLD: 6 % (ref 5–13)
MONOCYTES NFR BLD: 7 % (ref 5–13)
MONOCYTES NFR BLD: 8 % (ref 5–13)
MONOCYTES NFR BLD: 8 % (ref 5–13)
MONOCYTES NFR BLD: 9 % (ref 5–13)
NEUTS SEG # BLD: 10.1 K/UL (ref 1.8–8)
NEUTS SEG # BLD: 10.2 K/UL (ref 1.8–8)
NEUTS SEG # BLD: 10.3 K/UL (ref 1.8–8)
NEUTS SEG # BLD: 10.4 K/UL (ref 1.8–8)
NEUTS SEG # BLD: 11.6 K/UL (ref 1.8–8)
NEUTS SEG # BLD: 7.3 K/UL (ref 1.8–8)
NEUTS SEG # BLD: 8.3 K/UL (ref 1.8–8)
NEUTS SEG # BLD: 8.4 K/UL (ref 1.8–8)
NEUTS SEG # BLD: 8.5 K/UL (ref 1.8–8)
NEUTS SEG # BLD: 9.5 K/UL (ref 1.8–8)
NEUTS SEG # BLD: 9.8 K/UL (ref 1.8–8)
NEUTS SEG # BLD: 9.8 K/UL (ref 1.8–8)
NEUTS SEG NFR BLD: 70 % (ref 32–75)
NEUTS SEG NFR BLD: 74 % (ref 32–75)
NEUTS SEG NFR BLD: 76 % (ref 32–75)
NEUTS SEG NFR BLD: 77 % (ref 32–75)
NEUTS SEG NFR BLD: 77 % (ref 32–75)
NEUTS SEG NFR BLD: 82 % (ref 32–75)
NEUTS SEG NFR BLD: 84 % (ref 32–75)
NRBC # BLD: 0 K/UL (ref 0–0.01)
NRBC BLD-RTO: 0 PER 100 WBC
P-R INTERVAL, ECG05: 152 MS
PHOSPHATE SERPL-MCNC: 2.3 MG/DL (ref 2.6–4.7)
PHOSPHATE SERPL-MCNC: 2.6 MG/DL (ref 2.6–4.7)
PHOSPHATE SERPL-MCNC: 3.2 MG/DL (ref 2.6–4.7)
PLATELET # BLD AUTO: 297 K/UL (ref 150–400)
PLATELET # BLD AUTO: 304 K/UL (ref 150–400)
PLATELET # BLD AUTO: 305 K/UL (ref 150–400)
PLATELET # BLD AUTO: 316 K/UL (ref 150–400)
PLATELET # BLD AUTO: 317 K/UL (ref 150–400)
PLATELET # BLD AUTO: 321 K/UL (ref 150–400)
PLATELET # BLD AUTO: 329 K/UL (ref 150–400)
PLATELET # BLD AUTO: 331 K/UL (ref 150–400)
PLATELET # BLD AUTO: 335 K/UL (ref 150–400)
PLATELET # BLD AUTO: 343 K/UL (ref 150–400)
PLATELET # BLD AUTO: 361 K/UL (ref 150–400)
PLATELET # BLD AUTO: 372 K/UL (ref 150–400)
PLATELET COMMENTS,PCOM: ABNORMAL
PMV BLD AUTO: 9.2 FL (ref 8.9–12.9)
PMV BLD AUTO: 9.3 FL (ref 8.9–12.9)
PMV BLD AUTO: 9.4 FL (ref 8.9–12.9)
PMV BLD AUTO: 9.5 FL (ref 8.9–12.9)
PMV BLD AUTO: 9.6 FL (ref 8.9–12.9)
PMV BLD AUTO: 9.7 FL (ref 8.9–12.9)
POTASSIUM SERPL-SCNC: 3.1 MMOL/L (ref 3.5–5.1)
POTASSIUM SERPL-SCNC: 3.4 MMOL/L (ref 3.5–5.1)
POTASSIUM SERPL-SCNC: 3.5 MMOL/L (ref 3.5–5.1)
POTASSIUM SERPL-SCNC: 3.5 MMOL/L (ref 3.5–5.1)
POTASSIUM SERPL-SCNC: 3.6 MMOL/L (ref 3.5–5.1)
POTASSIUM SERPL-SCNC: 3.6 MMOL/L (ref 3.5–5.1)
POTASSIUM SERPL-SCNC: 3.7 MMOL/L (ref 3.5–5.1)
POTASSIUM SERPL-SCNC: 3.9 MMOL/L (ref 3.5–5.1)
POTASSIUM SERPL-SCNC: 4 MMOL/L (ref 3.5–5.1)
POTASSIUM SERPL-SCNC: 4 MMOL/L (ref 3.5–5.1)
POTASSIUM SERPL-SCNC: 4.3 MMOL/L (ref 3.5–5.1)
POTASSIUM SERPL-SCNC: 4.9 MMOL/L (ref 3.5–5.1)
POTASSIUM SERPL-SCNC: 5 MMOL/L (ref 3.5–5.1)
PROTHROMBIN TIME: 12 SEC (ref 9–11.1)
Q-T INTERVAL, ECG07: 322 MS
Q-T INTERVAL, ECG07: 356 MS
Q-T INTERVAL, ECG07: 362 MS
QRS DURATION, ECG06: 84 MS
QRS DURATION, ECG06: 90 MS
QRS DURATION, ECG06: 98 MS
QTC CALCULATION (BEZET), ECG08: 455 MS
QTC CALCULATION (BEZET), ECG08: 468 MS
QTC CALCULATION (BEZET), ECG08: 474 MS
RBC # BLD AUTO: 3.81 M/UL (ref 4.1–5.7)
RBC # BLD AUTO: 3.97 M/UL (ref 4.1–5.7)
RBC # BLD AUTO: 4.13 M/UL (ref 4.1–5.7)
RBC # BLD AUTO: 4.15 M/UL (ref 4.1–5.7)
RBC # BLD AUTO: 4.17 M/UL (ref 4.1–5.7)
RBC # BLD AUTO: 4.21 M/UL (ref 4.1–5.7)
RBC # BLD AUTO: 4.22 M/UL (ref 4.1–5.7)
RBC # BLD AUTO: 4.24 M/UL (ref 4.1–5.7)
RBC # BLD AUTO: 4.29 M/UL (ref 4.1–5.7)
RBC # BLD AUTO: 4.31 M/UL (ref 4.1–5.7)
RBC MORPH BLD: ABNORMAL
SERVICE CMNT-IMP: ABNORMAL
SERVICE CMNT-IMP: NORMAL
SODIUM SERPL-SCNC: 141 MMOL/L (ref 136–145)
SODIUM SERPL-SCNC: 142 MMOL/L (ref 136–145)
SODIUM SERPL-SCNC: 143 MMOL/L (ref 136–145)
SODIUM SERPL-SCNC: 144 MMOL/L (ref 136–145)
SODIUM SERPL-SCNC: 145 MMOL/L (ref 136–145)
SODIUM SERPL-SCNC: 147 MMOL/L (ref 136–145)
T4 FREE SERPL-MCNC: 0.6 NG/DL (ref 0.8–1.5)
TSH SERPL DL<=0.05 MIU/L-ACNC: 23.1 UIU/ML (ref 0.36–3.74)
VENTRICULAR RATE, ECG03: 103 BPM
VENTRICULAR RATE, ECG03: 104 BPM
VENTRICULAR RATE, ECG03: 120 BPM
WBC # BLD AUTO: 11 K/UL (ref 4.1–11.1)
WBC # BLD AUTO: 11.2 K/UL (ref 4.1–11.1)
WBC # BLD AUTO: 11.5 K/UL (ref 4.1–11.1)
WBC # BLD AUTO: 12.2 K/UL (ref 4.1–11.1)
WBC # BLD AUTO: 12.7 K/UL (ref 4.1–11.1)
WBC # BLD AUTO: 12.8 K/UL (ref 4.1–11.1)
WBC # BLD AUTO: 13.4 K/UL (ref 4.1–11.1)
WBC # BLD AUTO: 13.4 K/UL (ref 4.1–11.1)
WBC # BLD AUTO: 13.8 K/UL (ref 4.1–11.1)
WBC # BLD AUTO: 13.8 K/UL (ref 4.1–11.1)
WBC # BLD AUTO: 13.9 K/UL (ref 4.1–11.1)
WBC # BLD AUTO: 9.7 K/UL (ref 4.1–11.1)
WBC #/AREA STL HPF: NORMAL /HPF (ref 0–4)

## 2018-01-01 PROCEDURE — 65660000000 HC RM CCU STEPDOWN

## 2018-01-01 PROCEDURE — 97530 THERAPEUTIC ACTIVITIES: CPT

## 2018-01-01 PROCEDURE — 96365 THER/PROPH/DIAG IV INF INIT: CPT

## 2018-01-01 PROCEDURE — 74011250637 HC RX REV CODE- 250/637: Performed by: INTERNAL MEDICINE

## 2018-01-01 PROCEDURE — 99218 HC RM OBSERVATION: CPT

## 2018-01-01 PROCEDURE — 02WA3MZ REVISION OF CARDIAC LEAD IN HEART, PERCUTANEOUS APPROACH: ICD-10-PCS | Performed by: INTERNAL MEDICINE

## 2018-01-01 PROCEDURE — 74011000250 HC RX REV CODE- 250: Performed by: EMERGENCY MEDICINE

## 2018-01-01 PROCEDURE — 83735 ASSAY OF MAGNESIUM: CPT

## 2018-01-01 PROCEDURE — 97530 THERAPEUTIC ACTIVITIES: CPT | Performed by: OCCUPATIONAL THERAPIST

## 2018-01-01 PROCEDURE — 36415 COLL VENOUS BLD VENIPUNCTURE: CPT | Performed by: INTERNAL MEDICINE

## 2018-01-01 PROCEDURE — 77030009834 HC MSK O2 TRACH VYRM -A

## 2018-01-01 PROCEDURE — A9540 TC99M MAA: HCPCS

## 2018-01-01 PROCEDURE — 77010033678 HC OXYGEN DAILY

## 2018-01-01 PROCEDURE — 84100 ASSAY OF PHOSPHORUS: CPT

## 2018-01-01 PROCEDURE — 94760 N-INVAS EAR/PLS OXIMETRY 1: CPT

## 2018-01-01 PROCEDURE — 74011250637 HC RX REV CODE- 250/637: Performed by: EMERGENCY MEDICINE

## 2018-01-01 PROCEDURE — 80048 BASIC METABOLIC PNL TOTAL CA: CPT | Performed by: EMERGENCY MEDICINE

## 2018-01-01 PROCEDURE — 82962 GLUCOSE BLOOD TEST: CPT

## 2018-01-01 PROCEDURE — 71045 X-RAY EXAM CHEST 1 VIEW: CPT

## 2018-01-01 PROCEDURE — 77030002996 HC SUT SLK J&J -A

## 2018-01-01 PROCEDURE — 77030031139 HC SUT VCRL2 J&J -A

## 2018-01-01 PROCEDURE — 84443 ASSAY THYROID STIM HORMONE: CPT

## 2018-01-01 PROCEDURE — 74011250636 HC RX REV CODE- 250/636: Performed by: EMERGENCY MEDICINE

## 2018-01-01 PROCEDURE — 74011250636 HC RX REV CODE- 250/636

## 2018-01-01 PROCEDURE — 74011000250 HC RX REV CODE- 250

## 2018-01-01 PROCEDURE — 89055 LEUKOCYTE ASSESSMENT FECAL: CPT

## 2018-01-01 PROCEDURE — 74011000258 HC RX REV CODE- 258: Performed by: EMERGENCY MEDICINE

## 2018-01-01 PROCEDURE — C1898 LEAD, PMKR, OTHER THAN TRANS: HCPCS

## 2018-01-01 PROCEDURE — 80048 BASIC METABOLIC PNL TOTAL CA: CPT | Performed by: INTERNAL MEDICINE

## 2018-01-01 PROCEDURE — 84439 ASSAY OF FREE THYROXINE: CPT

## 2018-01-01 PROCEDURE — 85025 COMPLETE CBC W/AUTO DIFF WBC: CPT

## 2018-01-01 PROCEDURE — 80048 BASIC METABOLIC PNL TOTAL CA: CPT

## 2018-01-01 PROCEDURE — 97535 SELF CARE MNGMENT TRAINING: CPT | Performed by: OCCUPATIONAL THERAPIST

## 2018-01-01 PROCEDURE — 77030037713 HC CLOSR DEV INCIS ZIP STRY -B

## 2018-01-01 PROCEDURE — 36415 COLL VENOUS BLD VENIPUNCTURE: CPT | Performed by: NURSE PRACTITIONER

## 2018-01-01 PROCEDURE — 97116 GAIT TRAINING THERAPY: CPT

## 2018-01-01 PROCEDURE — 93005 ELECTROCARDIOGRAM TRACING: CPT

## 2018-01-01 PROCEDURE — 36415 COLL VENOUS BLD VENIPUNCTURE: CPT | Performed by: EMERGENCY MEDICINE

## 2018-01-01 PROCEDURE — 97535 SELF CARE MNGMENT TRAINING: CPT

## 2018-01-01 PROCEDURE — 70544 MR ANGIOGRAPHY HEAD W/O DYE: CPT

## 2018-01-01 PROCEDURE — 94640 AIRWAY INHALATION TREATMENT: CPT

## 2018-01-01 PROCEDURE — 74011250636 HC RX REV CODE- 250/636: Performed by: GENERAL ACUTE CARE HOSPITAL

## 2018-01-01 PROCEDURE — 74011250637 HC RX REV CODE- 250/637: Performed by: GENERAL ACUTE CARE HOSPITAL

## 2018-01-01 PROCEDURE — 36415 COLL VENOUS BLD VENIPUNCTURE: CPT | Performed by: GENERAL ACUTE CARE HOSPITAL

## 2018-01-01 PROCEDURE — 74011000250 HC RX REV CODE- 250: Performed by: INTERNAL MEDICINE

## 2018-01-01 PROCEDURE — 84100 ASSAY OF PHOSPHORUS: CPT | Performed by: INTERNAL MEDICINE

## 2018-01-01 PROCEDURE — 36415 COLL VENOUS BLD VENIPUNCTURE: CPT

## 2018-01-01 PROCEDURE — 77030029684 HC NEB SM VOL KT MONA -A

## 2018-01-01 PROCEDURE — 77030028698 HC BLD TISS PLSM MEDT -D

## 2018-01-01 PROCEDURE — 77030010545

## 2018-01-01 PROCEDURE — G8988 SELF CARE GOAL STATUS: HCPCS

## 2018-01-01 PROCEDURE — G8979 MOBILITY GOAL STATUS: HCPCS

## 2018-01-01 PROCEDURE — 97162 PT EVAL MOD COMPLEX 30 MIN: CPT

## 2018-01-01 PROCEDURE — 96367 TX/PROPH/DG ADDL SEQ IV INF: CPT

## 2018-01-01 PROCEDURE — 85025 COMPLETE CBC W/AUTO DIFF WBC: CPT | Performed by: NURSE PRACTITIONER

## 2018-01-01 PROCEDURE — 87040 BLOOD CULTURE FOR BACTERIA: CPT | Performed by: EMERGENCY MEDICINE

## 2018-01-01 PROCEDURE — G8978 MOBILITY CURRENT STATUS: HCPCS

## 2018-01-01 PROCEDURE — 77030018729 HC ELECTRD DEFIB PAD CARD -B

## 2018-01-01 PROCEDURE — 80048 BASIC METABOLIC PNL TOTAL CA: CPT | Performed by: NURSE PRACTITIONER

## 2018-01-01 PROCEDURE — 85025 COMPLETE CBC W/AUTO DIFF WBC: CPT | Performed by: INTERNAL MEDICINE

## 2018-01-01 PROCEDURE — 74011250636 HC RX REV CODE- 250/636: Performed by: PSYCHIATRY & NEUROLOGY

## 2018-01-01 PROCEDURE — 77030011640 HC PAD GRND REM COVD -A

## 2018-01-01 PROCEDURE — 74011000250 HC RX REV CODE- 250: Performed by: GENERAL ACUTE CARE HOSPITAL

## 2018-01-01 PROCEDURE — 74011250636 HC RX REV CODE- 250/636: Performed by: INTERNAL MEDICINE

## 2018-01-01 PROCEDURE — A4565 SLINGS: HCPCS

## 2018-01-01 PROCEDURE — 87493 C DIFF AMPLIFIED PROBE: CPT

## 2018-01-01 PROCEDURE — 85025 COMPLETE CBC W/AUTO DIFF WBC: CPT | Performed by: GENERAL ACUTE CARE HOSPITAL

## 2018-01-01 PROCEDURE — 84132 ASSAY OF SERUM POTASSIUM: CPT | Performed by: INTERNAL MEDICINE

## 2018-01-01 PROCEDURE — 94761 N-INVAS EAR/PLS OXIMETRY MLT: CPT

## 2018-01-01 PROCEDURE — G8987 SELF CARE CURRENT STATUS: HCPCS

## 2018-01-01 PROCEDURE — 83605 ASSAY OF LACTIC ACID: CPT | Performed by: EMERGENCY MEDICINE

## 2018-01-01 PROCEDURE — 99153 MOD SED SAME PHYS/QHP EA: CPT

## 2018-01-01 PROCEDURE — 74011636320 HC RX REV CODE- 636/320

## 2018-01-01 PROCEDURE — C1892 INTRO/SHEATH,FIXED,PEEL-AWAY: HCPCS

## 2018-01-01 PROCEDURE — 83735 ASSAY OF MAGNESIUM: CPT | Performed by: INTERNAL MEDICINE

## 2018-01-01 PROCEDURE — C1785 PMKR, DUAL, RATE-RESP: HCPCS

## 2018-01-01 PROCEDURE — 85025 COMPLETE CBC W/AUTO DIFF WBC: CPT | Performed by: EMERGENCY MEDICINE

## 2018-01-01 PROCEDURE — 97165 OT EVAL LOW COMPLEX 30 MIN: CPT

## 2018-01-01 PROCEDURE — C1894 INTRO/SHEATH, NON-LASER: HCPCS

## 2018-01-01 PROCEDURE — 70553 MRI BRAIN STEM W/O & W/DYE: CPT

## 2018-01-01 PROCEDURE — 82272 OCCULT BLD FECES 1-3 TESTS: CPT | Performed by: GENERAL ACUTE CARE HOSPITAL

## 2018-01-01 PROCEDURE — 85379 FIBRIN DEGRADATION QUANT: CPT | Performed by: EMERGENCY MEDICINE

## 2018-01-01 PROCEDURE — 77030018836 HC SOL IRR NACL ICUM -A

## 2018-01-01 PROCEDURE — 80048 BASIC METABOLIC PNL TOTAL CA: CPT | Performed by: GENERAL ACUTE CARE HOSPITAL

## 2018-01-01 PROCEDURE — 85610 PROTHROMBIN TIME: CPT | Performed by: EMERGENCY MEDICINE

## 2018-01-01 PROCEDURE — 85018 HEMOGLOBIN: CPT | Performed by: INTERNAL MEDICINE

## 2018-01-01 PROCEDURE — 71046 X-RAY EXAM CHEST 2 VIEWS: CPT

## 2018-01-01 PROCEDURE — 36005 INJECTION EXT VENOGRAPHY: CPT

## 2018-01-01 PROCEDURE — 77030039266 HC ADH SKN EXOFIN S2SG -A

## 2018-01-01 PROCEDURE — 74011000258 HC RX REV CODE- 258: Performed by: GENERAL ACUTE CARE HOSPITAL

## 2018-01-01 PROCEDURE — 82272 OCCULT BLD FECES 1-3 TESTS: CPT | Performed by: EMERGENCY MEDICINE

## 2018-01-01 PROCEDURE — A9575 INJ GADOTERATE MEGLUMI 0.1ML: HCPCS | Performed by: PSYCHIATRY & NEUROLOGY

## 2018-01-01 PROCEDURE — 93306 TTE W/DOPPLER COMPLETE: CPT

## 2018-01-01 PROCEDURE — 99285 EMERGENCY DEPT VISIT HI MDM: CPT

## 2018-01-01 RX ORDER — LIDOCAINE HYDROCHLORIDE AND EPINEPHRINE 10; 10 MG/ML; UG/ML
1-40 INJECTION, SOLUTION INFILTRATION; PERINEURAL
Status: DISCONTINUED | OUTPATIENT
Start: 2018-01-01 | End: 2018-01-01

## 2018-01-01 RX ORDER — GUAIFENESIN 100 MG/5ML
81 LIQUID (ML) ORAL DAILY
Status: DISCONTINUED | OUTPATIENT
Start: 2018-01-01 | End: 2018-01-01 | Stop reason: HOSPADM

## 2018-01-01 RX ORDER — LEVOTHYROXINE SODIUM 50 UG/1
50 TABLET ORAL
Qty: 30 TAB | Refills: 0 | Status: SHIPPED
Start: 2018-01-01

## 2018-01-01 RX ORDER — ACETAMINOPHEN 325 MG/1
650 TABLET ORAL
Status: DISCONTINUED | OUTPATIENT
Start: 2018-01-01 | End: 2018-01-01 | Stop reason: HOSPADM

## 2018-01-01 RX ORDER — IPRATROPIUM BROMIDE AND ALBUTEROL SULFATE 2.5; .5 MG/3ML; MG/3ML
3 SOLUTION RESPIRATORY (INHALATION)
Status: COMPLETED | OUTPATIENT
Start: 2018-01-01 | End: 2018-01-01

## 2018-01-01 RX ORDER — FENTANYL CITRATE 50 UG/ML
25-50 INJECTION, SOLUTION INTRAMUSCULAR; INTRAVENOUS
Status: DISCONTINUED | OUTPATIENT
Start: 2018-01-01 | End: 2018-01-01 | Stop reason: HOSPADM

## 2018-01-01 RX ORDER — IPRATROPIUM BROMIDE 0.5 MG/2.5ML
0.5 SOLUTION RESPIRATORY (INHALATION) 2 TIMES DAILY
Status: DISCONTINUED | OUTPATIENT
Start: 2018-01-01 | End: 2018-01-01

## 2018-01-01 RX ORDER — MIDAZOLAM HYDROCHLORIDE 1 MG/ML
INJECTION, SOLUTION INTRAMUSCULAR; INTRAVENOUS
Status: COMPLETED
Start: 2018-01-01 | End: 2018-01-01

## 2018-01-01 RX ORDER — FUROSEMIDE 10 MG/ML
40 INJECTION INTRAMUSCULAR; INTRAVENOUS DAILY
Status: DISCONTINUED | OUTPATIENT
Start: 2018-01-01 | End: 2018-01-01

## 2018-01-01 RX ORDER — LANOLIN ALCOHOL/MO/W.PET/CERES
1 CREAM (GRAM) TOPICAL
Status: DISCONTINUED | OUTPATIENT
Start: 2018-01-01 | End: 2018-01-01 | Stop reason: HOSPADM

## 2018-01-01 RX ORDER — LEVALBUTEROL INHALATION SOLUTION 0.63 MG/3ML
0.63 SOLUTION RESPIRATORY (INHALATION)
Status: DISCONTINUED | OUTPATIENT
Start: 2018-01-01 | End: 2018-01-01

## 2018-01-01 RX ORDER — FUROSEMIDE 40 MG/1
40 TABLET ORAL DAILY
Qty: 30 TAB | Refills: 0 | Status: SHIPPED
Start: 2018-01-01

## 2018-01-01 RX ORDER — MIDODRINE HYDROCHLORIDE 5 MG/1
10 TABLET ORAL
Status: DISCONTINUED | OUTPATIENT
Start: 2018-01-01 | End: 2018-01-01 | Stop reason: HOSPADM

## 2018-01-01 RX ORDER — DONEPEZIL HYDROCHLORIDE 5 MG/1
10 TABLET, FILM COATED ORAL
Status: DISCONTINUED | OUTPATIENT
Start: 2018-01-01 | End: 2018-01-01 | Stop reason: HOSPADM

## 2018-01-01 RX ORDER — SERTRALINE HYDROCHLORIDE 50 MG/1
25 TABLET, FILM COATED ORAL
Status: DISCONTINUED | OUTPATIENT
Start: 2018-01-01 | End: 2018-01-01 | Stop reason: HOSPADM

## 2018-01-01 RX ORDER — METOPROLOL TARTRATE 50 MG/1
50 TABLET ORAL 2 TIMES DAILY
Qty: 60 TAB | Refills: 6 | Status: SHIPPED | OUTPATIENT
Start: 2018-01-01 | End: 2018-01-01

## 2018-01-01 RX ORDER — DILTIAZEM HYDROCHLORIDE 60 MG/1
60 TABLET, FILM COATED ORAL 3 TIMES DAILY
Status: DISCONTINUED | OUTPATIENT
Start: 2018-01-01 | End: 2018-01-01

## 2018-01-01 RX ORDER — SODIUM CHLORIDE 0.9 % (FLUSH) 0.9 %
5-10 SYRINGE (ML) INJECTION EVERY 8 HOURS
Status: DISCONTINUED | OUTPATIENT
Start: 2018-01-01 | End: 2018-01-01 | Stop reason: HOSPADM

## 2018-01-01 RX ORDER — FENTANYL CITRATE 50 UG/ML
INJECTION, SOLUTION INTRAMUSCULAR; INTRAVENOUS
Status: COMPLETED
Start: 2018-01-01 | End: 2018-01-01

## 2018-01-01 RX ORDER — HEPARIN SODIUM 200 [USP'U]/100ML
INJECTION, SOLUTION INTRAVENOUS
Status: COMPLETED
Start: 2018-01-01 | End: 2018-01-01

## 2018-01-01 RX ORDER — MIDAZOLAM HYDROCHLORIDE 1 MG/ML
.5-2 INJECTION, SOLUTION INTRAMUSCULAR; INTRAVENOUS
Status: DISCONTINUED | OUTPATIENT
Start: 2018-01-01 | End: 2018-01-01 | Stop reason: HOSPADM

## 2018-01-01 RX ORDER — HEPARIN SODIUM 200 [USP'U]/100ML
500 INJECTION, SOLUTION INTRAVENOUS ONCE
Status: COMPLETED | OUTPATIENT
Start: 2018-01-01 | End: 2018-01-01

## 2018-01-01 RX ORDER — LEVALBUTEROL INHALATION SOLUTION 0.63 MG/3ML
0.63 SOLUTION RESPIRATORY (INHALATION) 2 TIMES DAILY
Status: DISCONTINUED | OUTPATIENT
Start: 2018-01-01 | End: 2018-01-01

## 2018-01-01 RX ORDER — BACITRACIN 50000 [IU]/1
50000 INJECTION, POWDER, FOR SOLUTION INTRAMUSCULAR ONCE
Status: COMPLETED | OUTPATIENT
Start: 2018-01-01 | End: 2018-01-01

## 2018-01-01 RX ORDER — IPRATROPIUM BROMIDE 0.5 MG/2.5ML
0.5 SOLUTION RESPIRATORY (INHALATION)
Status: DISCONTINUED | OUTPATIENT
Start: 2018-01-01 | End: 2018-01-01 | Stop reason: HOSPADM

## 2018-01-01 RX ORDER — IPRATROPIUM BROMIDE 0.5 MG/2.5ML
0.5 SOLUTION RESPIRATORY (INHALATION)
Status: DISCONTINUED | OUTPATIENT
Start: 2018-01-01 | End: 2018-01-01

## 2018-01-01 RX ORDER — LIDOCAINE HYDROCHLORIDE AND EPINEPHRINE 10; 10 MG/ML; UG/ML
INJECTION, SOLUTION INFILTRATION; PERINEURAL
Status: COMPLETED
Start: 2018-01-01 | End: 2018-01-01

## 2018-01-01 RX ORDER — METOPROLOL TARTRATE 50 MG/1
100 TABLET ORAL 2 TIMES DAILY
Status: DISCONTINUED | OUTPATIENT
Start: 2018-01-01 | End: 2018-01-01 | Stop reason: HOSPADM

## 2018-01-01 RX ORDER — POTASSIUM CHLORIDE 750 MG/1
40 TABLET, FILM COATED, EXTENDED RELEASE ORAL EVERY 6 HOURS
Status: COMPLETED | OUTPATIENT
Start: 2018-01-01 | End: 2018-01-01

## 2018-01-01 RX ORDER — FUROSEMIDE 40 MG/1
40 TABLET ORAL 2 TIMES DAILY
Status: DISCONTINUED | OUTPATIENT
Start: 2018-01-01 | End: 2018-01-01

## 2018-01-01 RX ORDER — ACETAMINOPHEN 325 MG/1
650 TABLET ORAL
Status: DISCONTINUED | OUTPATIENT
Start: 2018-01-01 | End: 2018-01-01 | Stop reason: SDUPTHER

## 2018-01-01 RX ORDER — METOPROLOL TARTRATE 100 MG/1
100 TABLET ORAL 2 TIMES DAILY
Qty: 60 TAB | Refills: 0 | Status: SHIPPED
Start: 2018-01-01

## 2018-01-01 RX ORDER — ATORVASTATIN CALCIUM 20 MG/1
20 TABLET, FILM COATED ORAL DAILY
Status: DISCONTINUED | OUTPATIENT
Start: 2018-01-01 | End: 2018-01-01 | Stop reason: HOSPADM

## 2018-01-01 RX ORDER — ATORVASTATIN CALCIUM 20 MG/1
20 TABLET, FILM COATED ORAL
Status: DISCONTINUED | OUTPATIENT
Start: 2018-01-01 | End: 2018-01-01 | Stop reason: HOSPADM

## 2018-01-01 RX ORDER — IPRATROPIUM BROMIDE AND ALBUTEROL SULFATE 2.5; .5 MG/3ML; MG/3ML
SOLUTION RESPIRATORY (INHALATION)
Status: DISPENSED
Start: 2018-01-01 | End: 2018-01-01

## 2018-01-01 RX ORDER — HYDROCODONE BITARTRATE AND ACETAMINOPHEN 5; 325 MG/1; MG/1
1 TABLET ORAL
Status: DISCONTINUED | OUTPATIENT
Start: 2018-01-01 | End: 2018-01-01 | Stop reason: HOSPADM

## 2018-01-01 RX ORDER — FUROSEMIDE 10 MG/ML
40 INJECTION INTRAMUSCULAR; INTRAVENOUS
Status: COMPLETED | OUTPATIENT
Start: 2018-01-01 | End: 2018-01-01

## 2018-01-01 RX ORDER — SODIUM CHLORIDE 9 MG/ML
150 INJECTION, SOLUTION INTRAVENOUS ONCE
Status: COMPLETED | OUTPATIENT
Start: 2018-01-01 | End: 2018-01-01

## 2018-01-01 RX ORDER — CEFAZOLIN SODIUM/WATER 2 G/20 ML
2 SYRINGE (ML) INTRAVENOUS ONCE
Status: COMPLETED | OUTPATIENT
Start: 2018-01-01 | End: 2018-01-01

## 2018-01-01 RX ORDER — LEVOFLOXACIN 5 MG/ML
750 INJECTION, SOLUTION INTRAVENOUS
Status: COMPLETED | OUTPATIENT
Start: 2018-01-01 | End: 2018-01-01

## 2018-01-01 RX ORDER — FENTANYL CITRATE 50 UG/ML
12.5-5 INJECTION, SOLUTION INTRAMUSCULAR; INTRAVENOUS
Status: DISCONTINUED | OUTPATIENT
Start: 2018-01-01 | End: 2018-01-01

## 2018-01-01 RX ORDER — DILTIAZEM HYDROCHLORIDE 30 MG/1
30 TABLET, FILM COATED ORAL 3 TIMES DAILY
Status: DISCONTINUED | OUTPATIENT
Start: 2018-01-01 | End: 2018-01-01

## 2018-01-01 RX ORDER — METOPROLOL TARTRATE 50 MG/1
50 TABLET ORAL 3 TIMES DAILY
Status: DISCONTINUED | OUTPATIENT
Start: 2018-01-01 | End: 2018-01-01

## 2018-01-01 RX ORDER — MIDAZOLAM HYDROCHLORIDE 1 MG/ML
1-5 INJECTION, SOLUTION INTRAMUSCULAR; INTRAVENOUS
Status: DISCONTINUED | OUTPATIENT
Start: 2018-01-01 | End: 2018-01-01

## 2018-01-01 RX ORDER — CLOPIDOGREL BISULFATE 75 MG/1
75 TABLET ORAL DAILY
Status: DISCONTINUED | OUTPATIENT
Start: 2018-01-01 | End: 2018-01-01 | Stop reason: HOSPADM

## 2018-01-01 RX ORDER — LEVALBUTEROL INHALATION SOLUTION 0.63 MG/3ML
0.63 SOLUTION RESPIRATORY (INHALATION)
Status: DISCONTINUED | OUTPATIENT
Start: 2018-01-01 | End: 2018-01-01 | Stop reason: HOSPADM

## 2018-01-01 RX ORDER — CETIRIZINE HCL 10 MG
5 TABLET ORAL DAILY
Status: DISCONTINUED | OUTPATIENT
Start: 2018-01-01 | End: 2018-01-01 | Stop reason: HOSPADM

## 2018-01-01 RX ORDER — FUROSEMIDE 10 MG/ML
20 INJECTION INTRAMUSCULAR; INTRAVENOUS ONCE
Status: COMPLETED | OUTPATIENT
Start: 2018-01-01 | End: 2018-01-01

## 2018-01-01 RX ORDER — GABAPENTIN 600 MG/1
600 TABLET ORAL 4 TIMES DAILY
Qty: 360 TAB | Refills: 3 | Status: SHIPPED | OUTPATIENT
Start: 2018-01-01

## 2018-01-01 RX ORDER — CEPHALEXIN 500 MG/1
500 CAPSULE ORAL 4 TIMES DAILY
Qty: 12 CAP | Refills: 0 | Status: SHIPPED | OUTPATIENT
Start: 2018-01-01 | End: 2018-01-01

## 2018-01-01 RX ORDER — DILTIAZEM HYDROCHLORIDE 30 MG/1
30 TABLET, FILM COATED ORAL 3 TIMES DAILY
COMMUNITY
End: 2018-01-01

## 2018-01-01 RX ORDER — PANTOPRAZOLE SODIUM 40 MG/1
40 TABLET, DELAYED RELEASE ORAL
Status: DISCONTINUED | OUTPATIENT
Start: 2018-01-01 | End: 2018-01-01 | Stop reason: HOSPADM

## 2018-01-01 RX ORDER — AMIODARONE HYDROCHLORIDE 200 MG/1
200 TABLET ORAL 2 TIMES DAILY
Status: DISCONTINUED | OUTPATIENT
Start: 2018-01-01 | End: 2018-01-01 | Stop reason: HOSPADM

## 2018-01-01 RX ORDER — METOPROLOL TARTRATE 50 MG/1
50 TABLET ORAL 2 TIMES DAILY
Status: DISCONTINUED | OUTPATIENT
Start: 2018-01-01 | End: 2018-01-01 | Stop reason: HOSPADM

## 2018-01-01 RX ORDER — BACITRACIN 50000 [IU]/1
INJECTION, POWDER, FOR SOLUTION INTRAMUSCULAR
Status: COMPLETED
Start: 2018-01-01 | End: 2018-01-01

## 2018-01-01 RX ORDER — HEPARIN SODIUM 5000 [USP'U]/ML
5000 INJECTION, SOLUTION INTRAVENOUS; SUBCUTANEOUS EVERY 8 HOURS
Status: DISCONTINUED | OUTPATIENT
Start: 2018-01-01 | End: 2018-01-01

## 2018-01-01 RX ORDER — IPRATROPIUM BROMIDE AND ALBUTEROL SULFATE 2.5; .5 MG/3ML; MG/3ML
3 SOLUTION RESPIRATORY (INHALATION)
Status: DISCONTINUED | OUTPATIENT
Start: 2018-01-01 | End: 2018-01-01 | Stop reason: HOSPADM

## 2018-01-01 RX ORDER — FUROSEMIDE 40 MG/1
40 TABLET ORAL DAILY
Status: DISCONTINUED | OUTPATIENT
Start: 2018-01-01 | End: 2018-01-01 | Stop reason: HOSPADM

## 2018-01-01 RX ORDER — SODIUM CHLORIDE 0.9 % (FLUSH) 0.9 %
5-10 SYRINGE (ML) INJECTION EVERY 8 HOURS
Status: DISCONTINUED | OUTPATIENT
Start: 2018-01-01 | End: 2018-01-01 | Stop reason: SDUPTHER

## 2018-01-01 RX ORDER — SODIUM CHLORIDE 9 MG/ML
75 INJECTION, SOLUTION INTRAVENOUS CONTINUOUS
Status: DISPENSED | OUTPATIENT
Start: 2018-01-01 | End: 2018-01-01

## 2018-01-01 RX ORDER — LIDOCAINE HYDROCHLORIDE AND EPINEPHRINE 10; 10 MG/ML; UG/ML
0-30 INJECTION, SOLUTION INFILTRATION; PERINEURAL ONCE
Status: COMPLETED | OUTPATIENT
Start: 2018-01-01 | End: 2018-01-01

## 2018-01-01 RX ORDER — SODIUM CHLORIDE 900 MG/100ML
INJECTION INTRAVENOUS
Status: DISCONTINUED
Start: 2018-01-01 | End: 2018-01-01 | Stop reason: ALTCHOICE

## 2018-01-01 RX ORDER — LANOLIN ALCOHOL/MO/W.PET/CERES
CREAM (GRAM) TOPICAL
COMMUNITY

## 2018-01-01 RX ORDER — FUROSEMIDE 10 MG/ML
INJECTION INTRAMUSCULAR; INTRAVENOUS
Status: COMPLETED
Start: 2018-01-01 | End: 2018-01-01

## 2018-01-01 RX ORDER — LIDOCAINE HYDROCHLORIDE AND EPINEPHRINE 10; 10 MG/ML; UG/ML
1-20 INJECTION, SOLUTION INFILTRATION; PERINEURAL
Status: DISCONTINUED | OUTPATIENT
Start: 2018-01-01 | End: 2018-01-01

## 2018-01-01 RX ORDER — FINASTERIDE 5 MG/1
5 TABLET, FILM COATED ORAL DAILY
COMMUNITY

## 2018-01-01 RX ORDER — POTASSIUM CHLORIDE 20 MEQ/1
40 TABLET, EXTENDED RELEASE ORAL
Status: COMPLETED | OUTPATIENT
Start: 2018-01-01 | End: 2018-01-01

## 2018-01-01 RX ORDER — ALBUTEROL SULFATE 90 UG/1
2 AEROSOL, METERED RESPIRATORY (INHALATION)
COMMUNITY

## 2018-01-01 RX ORDER — CEFAZOLIN SODIUM/WATER 2 G/20 ML
SYRINGE (ML) INTRAVENOUS
Status: COMPLETED
Start: 2018-01-01 | End: 2018-01-01

## 2018-01-01 RX ORDER — ONDANSETRON 2 MG/ML
4 INJECTION INTRAMUSCULAR; INTRAVENOUS
Status: DISCONTINUED | OUTPATIENT
Start: 2018-01-01 | End: 2018-01-01 | Stop reason: HOSPADM

## 2018-01-01 RX ORDER — SODIUM CHLORIDE 0.9 % (FLUSH) 0.9 %
5-10 SYRINGE (ML) INJECTION AS NEEDED
Status: DISCONTINUED | OUTPATIENT
Start: 2018-01-01 | End: 2018-01-01 | Stop reason: HOSPADM

## 2018-01-01 RX ORDER — MIDODRINE HYDROCHLORIDE 5 MG/1
2.5 TABLET ORAL 2 TIMES DAILY WITH MEALS
Status: DISCONTINUED | OUTPATIENT
Start: 2018-01-01 | End: 2018-01-01

## 2018-01-01 RX ORDER — LEVOFLOXACIN 5 MG/ML
500 INJECTION, SOLUTION INTRAVENOUS
Status: DISCONTINUED | OUTPATIENT
Start: 2018-01-01 | End: 2018-01-01 | Stop reason: SDUPTHER

## 2018-01-01 RX ORDER — GABAPENTIN 300 MG/1
600 CAPSULE ORAL 3 TIMES DAILY
Status: DISCONTINUED | OUTPATIENT
Start: 2018-01-01 | End: 2018-01-01 | Stop reason: HOSPADM

## 2018-01-01 RX ORDER — LEVOTHYROXINE SODIUM 50 UG/1
50 TABLET ORAL
Status: DISCONTINUED | OUTPATIENT
Start: 2018-01-01 | End: 2018-01-01 | Stop reason: HOSPADM

## 2018-01-01 RX ORDER — SODIUM CHLORIDE 0.9 % (FLUSH) 0.9 %
5-10 SYRINGE (ML) INJECTION AS NEEDED
Status: DISCONTINUED | OUTPATIENT
Start: 2018-01-01 | End: 2018-01-01 | Stop reason: SDUPTHER

## 2018-01-01 RX ORDER — GADOTERATE MEGLUMINE 376.9 MG/ML
14 INJECTION INTRAVENOUS
Status: COMPLETED | OUTPATIENT
Start: 2018-01-01 | End: 2018-01-01

## 2018-01-01 RX ORDER — METOPROLOL TARTRATE 5 MG/5ML
5 INJECTION INTRAVENOUS
Status: DISCONTINUED | OUTPATIENT
Start: 2018-01-01 | End: 2018-01-01 | Stop reason: HOSPADM

## 2018-01-01 RX ORDER — ALBUTEROL SULFATE 90 UG/1
2 AEROSOL, METERED RESPIRATORY (INHALATION)
Status: DISCONTINUED | OUTPATIENT
Start: 2018-01-01 | End: 2018-01-01 | Stop reason: HOSPADM

## 2018-01-01 RX ORDER — LIDOCAINE HYDROCHLORIDE AND EPINEPHRINE 10; 10 MG/ML; UG/ML
INJECTION, SOLUTION INFILTRATION; PERINEURAL
Status: DISCONTINUED
Start: 2018-01-01 | End: 2018-01-01

## 2018-01-01 RX ORDER — FINASTERIDE 5 MG/1
5 TABLET, FILM COATED ORAL DAILY
Status: DISCONTINUED | OUTPATIENT
Start: 2018-01-01 | End: 2018-01-01 | Stop reason: HOSPADM

## 2018-01-01 RX ORDER — GUAIFENESIN 100 MG/5ML
81 LIQUID (ML) ORAL DAILY
COMMUNITY

## 2018-01-01 RX ORDER — POTASSIUM CHLORIDE 750 MG/1
10 TABLET, FILM COATED, EXTENDED RELEASE ORAL DAILY
Status: DISCONTINUED | OUTPATIENT
Start: 2018-01-01 | End: 2018-01-01 | Stop reason: HOSPADM

## 2018-01-01 RX ORDER — CEFAZOLIN SODIUM/WATER 2 G/20 ML
2 SYRINGE (ML) INTRAVENOUS EVERY 8 HOURS
Status: COMPLETED | OUTPATIENT
Start: 2018-01-01 | End: 2018-01-01

## 2018-01-01 RX ORDER — AMIODARONE HYDROCHLORIDE 200 MG/1
200 TABLET ORAL 2 TIMES DAILY
COMMUNITY

## 2018-01-01 RX ORDER — METOPROLOL TARTRATE 50 MG/1
50 TABLET ORAL 2 TIMES DAILY
Status: DISCONTINUED | OUTPATIENT
Start: 2018-01-01 | End: 2018-01-01

## 2018-01-01 RX ORDER — MIDODRINE HYDROCHLORIDE 5 MG/1
5 TABLET ORAL
Status: DISCONTINUED | OUTPATIENT
Start: 2018-01-01 | End: 2018-01-01

## 2018-01-01 RX ORDER — ENOXAPARIN SODIUM 100 MG/ML
30 INJECTION SUBCUTANEOUS EVERY 24 HOURS
Status: DISCONTINUED | OUTPATIENT
Start: 2018-01-01 | End: 2018-01-01 | Stop reason: DRUGHIGH

## 2018-01-01 RX ORDER — VANCOMYCIN HYDROCHLORIDE 1 G/20ML
INJECTION, POWDER, LYOPHILIZED, FOR SOLUTION INTRAVENOUS
Status: DISCONTINUED
Start: 2018-01-01 | End: 2018-01-01 | Stop reason: ALTCHOICE

## 2018-01-01 RX ORDER — LEVOFLOXACIN 500 MG/1
500 TABLET, FILM COATED ORAL EVERY OTHER DAY
Status: DISCONTINUED | OUTPATIENT
Start: 2018-01-01 | End: 2018-01-01

## 2018-01-01 RX ORDER — MIDODRINE HYDROCHLORIDE 10 MG/1
10 TABLET ORAL
Qty: 90 TAB | Refills: 0 | Status: SHIPPED
Start: 2018-01-01 | End: 2018-09-09

## 2018-01-01 RX ADMIN — Medication 2 G: at 14:09

## 2018-01-01 RX ADMIN — FUROSEMIDE 40 MG: 40 TABLET ORAL at 08:40

## 2018-01-01 RX ADMIN — VANCOMYCIN HYDROCHLORIDE 125 MG: 1 INJECTION, POWDER, LYOPHILIZED, FOR SOLUTION INTRAVENOUS at 17:54

## 2018-01-01 RX ADMIN — FERROUS SULFATE TAB 325 MG (65 MG ELEMENTAL FE) 325 MG: 325 (65 FE) TAB at 16:13

## 2018-01-01 RX ADMIN — POTASSIUM CHLORIDE 10 MEQ: 750 TABLET, EXTENDED RELEASE ORAL at 09:13

## 2018-01-01 RX ADMIN — PANTOPRAZOLE SODIUM 40 MG: 40 TABLET, DELAYED RELEASE ORAL at 10:16

## 2018-01-01 RX ADMIN — Medication 10 ML: at 21:46

## 2018-01-01 RX ADMIN — ACETAMINOPHEN 650 MG: 325 TABLET ORAL at 01:19

## 2018-01-01 RX ADMIN — VANCOMYCIN HYDROCHLORIDE 125 MG: 1 INJECTION, POWDER, LYOPHILIZED, FOR SOLUTION INTRAVENOUS at 18:53

## 2018-01-01 RX ADMIN — Medication 10 ML: at 03:35

## 2018-01-01 RX ADMIN — IOPAMIDOL 15 ML: 755 INJECTION, SOLUTION INTRAVENOUS at 14:25

## 2018-01-01 RX ADMIN — ASPIRIN 81 MG 81 MG: 81 TABLET ORAL at 16:13

## 2018-01-01 RX ADMIN — POTASSIUM CHLORIDE 10 MEQ: 750 TABLET, EXTENDED RELEASE ORAL at 08:48

## 2018-01-01 RX ADMIN — CLOPIDOGREL BISULFATE 75 MG: 75 TABLET ORAL at 09:50

## 2018-01-01 RX ADMIN — AMIODARONE HYDROCHLORIDE 200 MG: 200 TABLET ORAL at 09:50

## 2018-01-01 RX ADMIN — PIPERACILLIN SODIUM,TAZOBACTAM SODIUM 3.38 G: 3; .375 INJECTION, POWDER, FOR SOLUTION INTRAVENOUS at 18:01

## 2018-01-01 RX ADMIN — LEVALBUTEROL HYDROCHLORIDE 0.63 MG: 0.63 SOLUTION RESPIRATORY (INHALATION) at 19:34

## 2018-01-01 RX ADMIN — Medication 10 ML: at 05:34

## 2018-01-01 RX ADMIN — Medication 1 CAPSULE: at 08:40

## 2018-01-01 RX ADMIN — POTASSIUM CHLORIDE 10 MEQ: 750 TABLET, EXTENDED RELEASE ORAL at 08:55

## 2018-01-01 RX ADMIN — LEVALBUTEROL HYDROCHLORIDE 0.63 MG: 0.63 SOLUTION RESPIRATORY (INHALATION) at 20:06

## 2018-01-01 RX ADMIN — LEVALBUTEROL HYDROCHLORIDE 0.63 MG: 0.63 SOLUTION RESPIRATORY (INHALATION) at 16:29

## 2018-01-01 RX ADMIN — FUROSEMIDE 40 MG: 40 TABLET ORAL at 09:32

## 2018-01-01 RX ADMIN — GADOTERATE MEGLUMINE 15 ML: 376.9 INJECTION INTRAVENOUS at 11:38

## 2018-01-01 RX ADMIN — METOPROLOL TARTRATE 50 MG: 50 TABLET ORAL at 09:11

## 2018-01-01 RX ADMIN — ASPIRIN 81 MG 81 MG: 81 TABLET ORAL at 08:45

## 2018-01-01 RX ADMIN — METOPROLOL TARTRATE 100 MG: 50 TABLET ORAL at 08:41

## 2018-01-01 RX ADMIN — FERROUS SULFATE TAB 325 MG (65 MG ELEMENTAL FE) 325 MG: 325 (65 FE) TAB at 08:55

## 2018-01-01 RX ADMIN — FUROSEMIDE 40 MG: 40 TABLET ORAL at 17:16

## 2018-01-01 RX ADMIN — CLOPIDOGREL BISULFATE 75 MG: 75 TABLET ORAL at 08:55

## 2018-01-01 RX ADMIN — DONEPEZIL HYDROCHLORIDE 10 MG: 5 TABLET, FILM COATED ORAL at 23:13

## 2018-01-01 RX ADMIN — VANCOMYCIN HYDROCHLORIDE 125 MG: 1 INJECTION, POWDER, LYOPHILIZED, FOR SOLUTION INTRAVENOUS at 01:02

## 2018-01-01 RX ADMIN — METOPROLOL TARTRATE 100 MG: 50 TABLET ORAL at 09:07

## 2018-01-01 RX ADMIN — DONEPEZIL HYDROCHLORIDE 10 MG: 5 TABLET, FILM COATED ORAL at 21:14

## 2018-01-01 RX ADMIN — DONEPEZIL HYDROCHLORIDE 10 MG: 5 TABLET, FILM COATED ORAL at 21:34

## 2018-01-01 RX ADMIN — MIDAZOLAM HYDROCHLORIDE 1 MG: 1 INJECTION, SOLUTION INTRAMUSCULAR; INTRAVENOUS at 14:19

## 2018-01-01 RX ADMIN — CLOPIDOGREL BISULFATE 75 MG: 75 TABLET ORAL at 08:40

## 2018-01-01 RX ADMIN — LEVALBUTEROL HYDROCHLORIDE 0.63 MG: 0.63 SOLUTION RESPIRATORY (INHALATION) at 07:37

## 2018-01-01 RX ADMIN — POTASSIUM CHLORIDE 10 MEQ: 750 TABLET, EXTENDED RELEASE ORAL at 08:40

## 2018-01-01 RX ADMIN — VANCOMYCIN HYDROCHLORIDE 125 MG: 1 INJECTION, POWDER, LYOPHILIZED, FOR SOLUTION INTRAVENOUS at 12:53

## 2018-01-01 RX ADMIN — ATORVASTATIN CALCIUM 20 MG: 20 TABLET, FILM COATED ORAL at 08:40

## 2018-01-01 RX ADMIN — FERROUS SULFATE TAB 325 MG (65 MG ELEMENTAL FE) 325 MG: 325 (65 FE) TAB at 10:16

## 2018-01-01 RX ADMIN — AMIODARONE HYDROCHLORIDE 200 MG: 200 TABLET ORAL at 08:55

## 2018-01-01 RX ADMIN — BACITRACIN 50000 UNITS: 5000 INJECTION, POWDER, FOR SOLUTION INTRAMUSCULAR at 16:56

## 2018-01-01 RX ADMIN — VANCOMYCIN HYDROCHLORIDE 125 MG: 1 INJECTION, POWDER, LYOPHILIZED, FOR SOLUTION INTRAVENOUS at 13:39

## 2018-01-01 RX ADMIN — LEVALBUTEROL HYDROCHLORIDE 0.63 MG: 0.63 SOLUTION RESPIRATORY (INHALATION) at 19:24

## 2018-01-01 RX ADMIN — ACETAMINOPHEN 650 MG: 325 TABLET ORAL at 15:38

## 2018-01-01 RX ADMIN — AMIODARONE HYDROCHLORIDE 200 MG: 200 TABLET ORAL at 09:31

## 2018-01-01 RX ADMIN — CLOPIDOGREL BISULFATE 75 MG: 75 TABLET ORAL at 08:30

## 2018-01-01 RX ADMIN — ACETAMINOPHEN 650 MG: 325 TABLET ORAL at 10:31

## 2018-01-01 RX ADMIN — FINASTERIDE 5 MG: 5 TABLET, FILM COATED ORAL at 09:06

## 2018-01-01 RX ADMIN — LIDOCAINE HYDROCHLORIDE,EPINEPHRINE BITARTRATE 20 ML: 10; .01 INJECTION, SOLUTION INFILTRATION; PERINEURAL at 16:34

## 2018-01-01 RX ADMIN — HEPARIN SODIUM 5000 UNITS: 5000 INJECTION INTRAVENOUS; SUBCUTANEOUS at 14:00

## 2018-01-01 RX ADMIN — MIDODRINE HYDROCHLORIDE 10 MG: 5 TABLET ORAL at 08:55

## 2018-01-01 RX ADMIN — PANTOPRAZOLE SODIUM 40 MG: 40 TABLET, DELAYED RELEASE ORAL at 09:44

## 2018-01-01 RX ADMIN — Medication 10 ML: at 15:26

## 2018-01-01 RX ADMIN — LEVOTHYROXINE SODIUM 50 MCG: 50 TABLET ORAL at 05:04

## 2018-01-01 RX ADMIN — SERTRALINE HYDROCHLORIDE 25 MG: 50 TABLET ORAL at 23:37

## 2018-01-01 RX ADMIN — ATORVASTATIN CALCIUM 20 MG: 20 TABLET, FILM COATED ORAL at 10:16

## 2018-01-01 RX ADMIN — HEPARIN SODIUM 1000 UNITS: 200 INJECTION, SOLUTION INTRAVENOUS at 14:03

## 2018-01-01 RX ADMIN — METOPROLOL TARTRATE 50 MG: 50 TABLET ORAL at 18:49

## 2018-01-01 RX ADMIN — SODIUM CHLORIDE 250 ML: 900 INJECTION, SOLUTION INTRAVENOUS at 12:45

## 2018-01-01 RX ADMIN — SERTRALINE HYDROCHLORIDE 25 MG: 50 TABLET ORAL at 23:12

## 2018-01-01 RX ADMIN — FINASTERIDE 5 MG: 5 TABLET, FILM COATED ORAL at 08:55

## 2018-01-01 RX ADMIN — ATORVASTATIN CALCIUM 20 MG: 20 TABLET, FILM COATED ORAL at 08:30

## 2018-01-01 RX ADMIN — MIDODRINE HYDROCHLORIDE 2.5 MG: 5 TABLET ORAL at 09:06

## 2018-01-01 RX ADMIN — VANCOMYCIN HYDROCHLORIDE 1000 MG: 1 INJECTION, POWDER, LYOPHILIZED, FOR SOLUTION INTRAVENOUS at 16:17

## 2018-01-01 RX ADMIN — LEVALBUTEROL HYDROCHLORIDE 0.63 MG: 0.63 SOLUTION RESPIRATORY (INHALATION) at 07:51

## 2018-01-01 RX ADMIN — SERTRALINE HYDROCHLORIDE 25 MG: 50 TABLET ORAL at 23:16

## 2018-01-01 RX ADMIN — FINASTERIDE 5 MG: 5 TABLET, FILM COATED ORAL at 08:45

## 2018-01-01 RX ADMIN — PIPERACILLIN SODIUM,TAZOBACTAM SODIUM 3.38 G: 3; .375 INJECTION, POWDER, FOR SOLUTION INTRAVENOUS at 01:04

## 2018-01-01 RX ADMIN — Medication 10 ML: at 14:00

## 2018-01-01 RX ADMIN — CETIRIZINE HYDROCHLORIDE 5 MG: 10 TABLET, FILM COATED ORAL at 09:06

## 2018-01-01 RX ADMIN — DONEPEZIL HYDROCHLORIDE 10 MG: 5 TABLET, FILM COATED ORAL at 21:45

## 2018-01-01 RX ADMIN — FINASTERIDE 5 MG: 5 TABLET, FILM COATED ORAL at 08:30

## 2018-01-01 RX ADMIN — AMIODARONE HYDROCHLORIDE 200 MG: 200 TABLET ORAL at 09:14

## 2018-01-01 RX ADMIN — Medication 10 ML: at 22:39

## 2018-01-01 RX ADMIN — PANTOPRAZOLE SODIUM 40 MG: 40 TABLET, DELAYED RELEASE ORAL at 09:14

## 2018-01-01 RX ADMIN — AMIODARONE HYDROCHLORIDE 200 MG: 200 TABLET ORAL at 18:46

## 2018-01-01 RX ADMIN — LEVOTHYROXINE SODIUM 50 MCG: 50 TABLET ORAL at 05:56

## 2018-01-01 RX ADMIN — MIDAZOLAM HYDROCHLORIDE 1 MG: 1 INJECTION, SOLUTION INTRAMUSCULAR; INTRAVENOUS at 16:49

## 2018-01-01 RX ADMIN — IPRATROPIUM BROMIDE 0.5 MG: 0.5 SOLUTION RESPIRATORY (INHALATION) at 08:29

## 2018-01-01 RX ADMIN — FLUTICASONE FUROATE 1 PUFF: 100 POWDER RESPIRATORY (INHALATION) at 23:43

## 2018-01-01 RX ADMIN — CLOPIDOGREL BISULFATE 75 MG: 75 TABLET ORAL at 08:46

## 2018-01-01 RX ADMIN — AMIODARONE HYDROCHLORIDE 200 MG: 200 TABLET ORAL at 09:05

## 2018-01-01 RX ADMIN — LEVOFLOXACIN 500 MG: 500 TABLET, FILM COATED ORAL at 16:13

## 2018-01-01 RX ADMIN — LEVOTHYROXINE SODIUM 50 MCG: 50 TABLET ORAL at 06:19

## 2018-01-01 RX ADMIN — FLUTICASONE FUROATE 1 PUFF: 100 POWDER RESPIRATORY (INHALATION) at 08:30

## 2018-01-01 RX ADMIN — ATORVASTATIN CALCIUM 20 MG: 20 TABLET, FILM COATED ORAL at 09:44

## 2018-01-01 RX ADMIN — AMIODARONE HYDROCHLORIDE 200 MG: 200 TABLET ORAL at 08:48

## 2018-01-01 RX ADMIN — Medication 10 ML: at 05:50

## 2018-01-01 RX ADMIN — PIPERACILLIN SODIUM,TAZOBACTAM SODIUM 3.38 G: 3; .375 INJECTION, POWDER, FOR SOLUTION INTRAVENOUS at 18:28

## 2018-01-01 RX ADMIN — SERTRALINE HYDROCHLORIDE 25 MG: 50 TABLET ORAL at 21:59

## 2018-01-01 RX ADMIN — FUROSEMIDE 40 MG: 10 INJECTION, SOLUTION INTRAMUSCULAR; INTRAVENOUS at 09:12

## 2018-01-01 RX ADMIN — Medication 10 ML: at 03:23

## 2018-01-01 RX ADMIN — IPRATROPIUM BROMIDE 0.5 MG: 0.5 SOLUTION RESPIRATORY (INHALATION) at 14:55

## 2018-01-01 RX ADMIN — Medication 10 ML: at 02:36

## 2018-01-01 RX ADMIN — METOPROLOL TARTRATE 50 MG: 50 TABLET ORAL at 21:39

## 2018-01-01 RX ADMIN — FUROSEMIDE 40 MG: 10 INJECTION, SOLUTION INTRAMUSCULAR; INTRAVENOUS at 10:18

## 2018-01-01 RX ADMIN — CLOPIDOGREL BISULFATE 75 MG: 75 TABLET ORAL at 09:05

## 2018-01-01 RX ADMIN — DONEPEZIL HYDROCHLORIDE 10 MG: 5 TABLET, FILM COATED ORAL at 23:16

## 2018-01-01 RX ADMIN — SERTRALINE HYDROCHLORIDE 25 MG: 50 TABLET ORAL at 22:38

## 2018-01-01 RX ADMIN — FLUTICASONE FUROATE 1 PUFF: 100 POWDER RESPIRATORY (INHALATION) at 09:32

## 2018-01-01 RX ADMIN — FLUTICASONE FUROATE 1 PUFF: 100 POWDER RESPIRATORY (INHALATION) at 09:41

## 2018-01-01 RX ADMIN — FERROUS SULFATE TAB 325 MG (65 MG ELEMENTAL FE) 325 MG: 325 (65 FE) TAB at 09:07

## 2018-01-01 RX ADMIN — BACITRACIN 50000 UNITS: 50000 INJECTION, POWDER, FOR SOLUTION INTRAMUSCULAR at 15:22

## 2018-01-01 RX ADMIN — IPRATROPIUM BROMIDE 0.5 MG: 0.5 SOLUTION RESPIRATORY (INHALATION) at 16:29

## 2018-01-01 RX ADMIN — VANCOMYCIN HYDROCHLORIDE 125 MG: 1 INJECTION, POWDER, LYOPHILIZED, FOR SOLUTION INTRAVENOUS at 12:29

## 2018-01-01 RX ADMIN — FINASTERIDE 5 MG: 5 TABLET, FILM COATED ORAL at 09:12

## 2018-01-01 RX ADMIN — LEVALBUTEROL HYDROCHLORIDE 0.63 MG: 0.63 SOLUTION RESPIRATORY (INHALATION) at 07:23

## 2018-01-01 RX ADMIN — CLOPIDOGREL BISULFATE 75 MG: 75 TABLET ORAL at 08:48

## 2018-01-01 RX ADMIN — DONEPEZIL HYDROCHLORIDE 10 MG: 5 TABLET, FILM COATED ORAL at 21:38

## 2018-01-01 RX ADMIN — LEVALBUTEROL HYDROCHLORIDE 0.63 MG: 0.63 SOLUTION RESPIRATORY (INHALATION) at 08:38

## 2018-01-01 RX ADMIN — FINASTERIDE 5 MG: 5 TABLET, FILM COATED ORAL at 10:16

## 2018-01-01 RX ADMIN — IPRATROPIUM BROMIDE 0.5 MG: 0.5 SOLUTION RESPIRATORY (INHALATION) at 21:34

## 2018-01-01 RX ADMIN — LEVALBUTEROL HYDROCHLORIDE 0.63 MG: 0.63 SOLUTION RESPIRATORY (INHALATION) at 11:54

## 2018-01-01 RX ADMIN — VANCOMYCIN HYDROCHLORIDE 125 MG: 1 INJECTION, POWDER, LYOPHILIZED, FOR SOLUTION INTRAVENOUS at 12:42

## 2018-01-01 RX ADMIN — IPRATROPIUM BROMIDE 0.5 MG: 0.5 SOLUTION RESPIRATORY (INHALATION) at 13:11

## 2018-01-01 RX ADMIN — ASPIRIN 81 MG 81 MG: 81 TABLET ORAL at 09:52

## 2018-01-01 RX ADMIN — ASPIRIN 81 MG 81 MG: 81 TABLET ORAL at 09:50

## 2018-01-01 RX ADMIN — DONEPEZIL HYDROCHLORIDE 10 MG: 5 TABLET, FILM COATED ORAL at 21:42

## 2018-01-01 RX ADMIN — Medication 10 ML: at 21:03

## 2018-01-01 RX ADMIN — DONEPEZIL HYDROCHLORIDE 10 MG: 5 TABLET, FILM COATED ORAL at 23:37

## 2018-01-01 RX ADMIN — Medication 10 ML: at 21:14

## 2018-01-01 RX ADMIN — FENTANYL CITRATE 25 MCG: 50 INJECTION, SOLUTION INTRAMUSCULAR; INTRAVENOUS at 14:59

## 2018-01-01 RX ADMIN — HEPARIN SODIUM 5000 UNITS: 5000 INJECTION INTRAVENOUS; SUBCUTANEOUS at 20:21

## 2018-01-01 RX ADMIN — METOPROLOL TARTRATE 100 MG: 50 TABLET ORAL at 08:45

## 2018-01-01 RX ADMIN — IPRATROPIUM BROMIDE 0.5 MG: 0.5 SOLUTION RESPIRATORY (INHALATION) at 08:27

## 2018-01-01 RX ADMIN — AMIODARONE HYDROCHLORIDE 200 MG: 200 TABLET ORAL at 17:54

## 2018-01-01 RX ADMIN — CLOPIDOGREL BISULFATE 75 MG: 75 TABLET ORAL at 09:13

## 2018-01-01 RX ADMIN — LEVOFLOXACIN 500 MG: 500 TABLET, FILM COATED ORAL at 18:45

## 2018-01-01 RX ADMIN — IPRATROPIUM BROMIDE 0.5 MG: 0.5 SOLUTION RESPIRATORY (INHALATION) at 16:25

## 2018-01-01 RX ADMIN — ATORVASTATIN CALCIUM 20 MG: 20 TABLET, FILM COATED ORAL at 21:46

## 2018-01-01 RX ADMIN — ASPIRIN 81 MG 81 MG: 81 TABLET ORAL at 08:30

## 2018-01-01 RX ADMIN — PANTOPRAZOLE SODIUM 40 MG: 40 TABLET, DELAYED RELEASE ORAL at 21:11

## 2018-01-01 RX ADMIN — IPRATROPIUM BROMIDE 0.5 MG: 0.5 SOLUTION RESPIRATORY (INHALATION) at 07:38

## 2018-01-01 RX ADMIN — LIDOCAINE HYDROCHLORIDE,EPINEPHRINE BITARTRATE 30 ML: 10; .01 INJECTION, SOLUTION INFILTRATION; PERINEURAL at 14:37

## 2018-01-01 RX ADMIN — IPRATROPIUM BROMIDE AND ALBUTEROL SULFATE 3 ML: .5; 3 SOLUTION RESPIRATORY (INHALATION) at 13:39

## 2018-01-01 RX ADMIN — FUROSEMIDE 40 MG: 10 INJECTION INTRAMUSCULAR; INTRAVENOUS at 16:54

## 2018-01-01 RX ADMIN — Medication 10 ML: at 21:47

## 2018-01-01 RX ADMIN — BACITRACIN 50000 UNITS: 50000 INJECTION, POWDER, FOR SOLUTION INTRAMUSCULAR at 16:56

## 2018-01-01 RX ADMIN — VANCOMYCIN HYDROCHLORIDE 125 MG: 1 INJECTION, POWDER, LYOPHILIZED, FOR SOLUTION INTRAVENOUS at 05:04

## 2018-01-01 RX ADMIN — SERTRALINE HYDROCHLORIDE 25 MG: 50 TABLET ORAL at 21:12

## 2018-01-01 RX ADMIN — MIDAZOLAM HYDROCHLORIDE 1 MG: 1 INJECTION, SOLUTION INTRAMUSCULAR; INTRAVENOUS at 14:59

## 2018-01-01 RX ADMIN — Medication 10 ML: at 06:50

## 2018-01-01 RX ADMIN — METOPROLOL TARTRATE 50 MG: 50 TABLET ORAL at 09:32

## 2018-01-01 RX ADMIN — ATORVASTATIN CALCIUM 20 MG: 20 TABLET, FILM COATED ORAL at 09:32

## 2018-01-01 RX ADMIN — VANCOMYCIN HYDROCHLORIDE 125 MG: 1 INJECTION, POWDER, LYOPHILIZED, FOR SOLUTION INTRAVENOUS at 17:25

## 2018-01-01 RX ADMIN — POTASSIUM CHLORIDE 40 MEQ: 750 TABLET, EXTENDED RELEASE ORAL at 13:21

## 2018-01-01 RX ADMIN — MIDODRINE HYDROCHLORIDE 5 MG: 5 TABLET ORAL at 17:54

## 2018-01-01 RX ADMIN — AMIODARONE HYDROCHLORIDE 200 MG: 200 TABLET ORAL at 17:37

## 2018-01-01 RX ADMIN — IPRATROPIUM BROMIDE AND ALBUTEROL SULFATE 3 ML: .5; 3 SOLUTION RESPIRATORY (INHALATION) at 17:11

## 2018-01-01 RX ADMIN — METOPROLOL TARTRATE 50 MG: 50 TABLET ORAL at 17:30

## 2018-01-01 RX ADMIN — DONEPEZIL HYDROCHLORIDE 10 MG: 5 TABLET, FILM COATED ORAL at 21:03

## 2018-01-01 RX ADMIN — Medication 1 CAPSULE: at 09:02

## 2018-01-01 RX ADMIN — VANCOMYCIN HYDROCHLORIDE 125 MG: 1 INJECTION, POWDER, LYOPHILIZED, FOR SOLUTION INTRAVENOUS at 17:37

## 2018-01-01 RX ADMIN — DONEPEZIL HYDROCHLORIDE 10 MG: 5 TABLET, FILM COATED ORAL at 21:11

## 2018-01-01 RX ADMIN — SERTRALINE HYDROCHLORIDE 25 MG: 50 TABLET ORAL at 21:14

## 2018-01-01 RX ADMIN — FINASTERIDE 5 MG: 5 TABLET, FILM COATED ORAL at 09:52

## 2018-01-01 RX ADMIN — Medication 1 CAPSULE: at 09:07

## 2018-01-01 RX ADMIN — HEPARIN SODIUM 5000 UNITS: 5000 INJECTION INTRAVENOUS; SUBCUTANEOUS at 20:19

## 2018-01-01 RX ADMIN — FERROUS SULFATE TAB 325 MG (65 MG ELEMENTAL FE) 325 MG: 325 (65 FE) TAB at 08:45

## 2018-01-01 RX ADMIN — VANCOMYCIN HYDROCHLORIDE 125 MG: 1 INJECTION, POWDER, LYOPHILIZED, FOR SOLUTION INTRAVENOUS at 05:50

## 2018-01-01 RX ADMIN — ASPIRIN 81 MG 81 MG: 81 TABLET ORAL at 10:18

## 2018-01-01 RX ADMIN — DILTIAZEM HYDROCHLORIDE 30 MG: 30 TABLET, FILM COATED ORAL at 09:06

## 2018-01-01 RX ADMIN — METOPROLOL TARTRATE 100 MG: 50 TABLET ORAL at 17:37

## 2018-01-01 RX ADMIN — Medication 10 ML: at 23:37

## 2018-01-01 RX ADMIN — FENTANYL CITRATE 25 MCG: 50 INJECTION, SOLUTION INTRAMUSCULAR; INTRAVENOUS at 14:19

## 2018-01-01 RX ADMIN — CLOPIDOGREL BISULFATE 75 MG: 75 TABLET ORAL at 09:52

## 2018-01-01 RX ADMIN — FUROSEMIDE 40 MG: 10 INJECTION, SOLUTION INTRAMUSCULAR; INTRAVENOUS at 16:54

## 2018-01-01 RX ADMIN — AMIODARONE HYDROCHLORIDE 200 MG: 200 TABLET ORAL at 10:17

## 2018-01-01 RX ADMIN — IPRATROPIUM BROMIDE 0.5 MG: 0.5 SOLUTION RESPIRATORY (INHALATION) at 21:30

## 2018-01-01 RX ADMIN — ASPIRIN 81 MG 81 MG: 81 TABLET ORAL at 10:16

## 2018-01-01 RX ADMIN — FERROUS SULFATE TAB 325 MG (65 MG ELEMENTAL FE) 325 MG: 325 (65 FE) TAB at 08:48

## 2018-01-01 RX ADMIN — VANCOMYCIN HYDROCHLORIDE 125 MG: 1 INJECTION, POWDER, LYOPHILIZED, FOR SOLUTION INTRAVENOUS at 23:06

## 2018-01-01 RX ADMIN — ASPIRIN 81 MG 81 MG: 81 TABLET ORAL at 09:32

## 2018-01-01 RX ADMIN — ASPIRIN 81 MG 81 MG: 81 TABLET ORAL at 08:48

## 2018-01-01 RX ADMIN — SERTRALINE HYDROCHLORIDE 25 MG: 50 TABLET ORAL at 21:38

## 2018-01-01 RX ADMIN — POTASSIUM CHLORIDE 40 MEQ: 20 TABLET, EXTENDED RELEASE ORAL at 10:27

## 2018-01-01 RX ADMIN — VANCOMYCIN HYDROCHLORIDE 125 MG: 1 INJECTION, POWDER, LYOPHILIZED, FOR SOLUTION INTRAVENOUS at 22:39

## 2018-01-01 RX ADMIN — IPRATROPIUM BROMIDE 0.5 MG: 0.5 SOLUTION RESPIRATORY (INHALATION) at 07:51

## 2018-01-01 RX ADMIN — VANCOMYCIN HYDROCHLORIDE 125 MG: 1 INJECTION, POWDER, LYOPHILIZED, FOR SOLUTION INTRAVENOUS at 17:40

## 2018-01-01 RX ADMIN — DILTIAZEM HYDROCHLORIDE 30 MG: 30 TABLET, FILM COATED ORAL at 21:49

## 2018-01-01 RX ADMIN — MIDAZOLAM HYDROCHLORIDE 2 MG: 1 INJECTION, SOLUTION INTRAMUSCULAR; INTRAVENOUS at 14:40

## 2018-01-01 RX ADMIN — VANCOMYCIN HYDROCHLORIDE 125 MG: 1 INJECTION, POWDER, LYOPHILIZED, FOR SOLUTION INTRAVENOUS at 11:38

## 2018-01-01 RX ADMIN — PIPERACILLIN SODIUM,TAZOBACTAM SODIUM 3.38 G: 3; .375 INJECTION, POWDER, FOR SOLUTION INTRAVENOUS at 09:50

## 2018-01-01 RX ADMIN — FUROSEMIDE 40 MG: 40 TABLET ORAL at 08:55

## 2018-01-01 RX ADMIN — Medication 10 ML: at 23:16

## 2018-01-01 RX ADMIN — ATORVASTATIN CALCIUM 20 MG: 20 TABLET, FILM COATED ORAL at 08:46

## 2018-01-01 RX ADMIN — VANCOMYCIN HYDROCHLORIDE 125 MG: 1 INJECTION, POWDER, LYOPHILIZED, FOR SOLUTION INTRAVENOUS at 05:34

## 2018-01-01 RX ADMIN — Medication 10 ML: at 06:20

## 2018-01-01 RX ADMIN — FUROSEMIDE 40 MG: 40 TABLET ORAL at 17:01

## 2018-01-01 RX ADMIN — FERROUS SULFATE TAB 325 MG (65 MG ELEMENTAL FE) 325 MG: 325 (65 FE) TAB at 09:01

## 2018-01-01 RX ADMIN — LEVALBUTEROL HYDROCHLORIDE 0.63 MG: 0.63 SOLUTION RESPIRATORY (INHALATION) at 08:10

## 2018-01-01 RX ADMIN — SERTRALINE HYDROCHLORIDE 25 MG: 50 TABLET ORAL at 21:46

## 2018-01-01 RX ADMIN — Medication 10 ML: at 06:03

## 2018-01-01 RX ADMIN — VANCOMYCIN HYDROCHLORIDE 125 MG: 1 INJECTION, POWDER, LYOPHILIZED, FOR SOLUTION INTRAVENOUS at 23:16

## 2018-01-01 RX ADMIN — CLOPIDOGREL BISULFATE 75 MG: 75 TABLET ORAL at 09:07

## 2018-01-01 RX ADMIN — LEVALBUTEROL HYDROCHLORIDE 0.63 MG: 0.63 SOLUTION RESPIRATORY (INHALATION) at 13:39

## 2018-01-01 RX ADMIN — FERROUS SULFATE TAB 325 MG (65 MG ELEMENTAL FE) 325 MG: 325 (65 FE) TAB at 09:52

## 2018-01-01 RX ADMIN — IPRATROPIUM BROMIDE AND ALBUTEROL SULFATE 3 ML: .5; 3 SOLUTION RESPIRATORY (INHALATION) at 22:17

## 2018-01-01 RX ADMIN — Medication 10 ML: at 21:45

## 2018-01-01 RX ADMIN — FINASTERIDE 5 MG: 5 TABLET, FILM COATED ORAL at 09:50

## 2018-01-01 RX ADMIN — Medication 10 ML: at 05:19

## 2018-01-01 RX ADMIN — Medication 10 ML: at 05:27

## 2018-01-01 RX ADMIN — IPRATROPIUM BROMIDE 0.5 MG: 0.5 SOLUTION RESPIRATORY (INHALATION) at 19:34

## 2018-01-01 RX ADMIN — ALBUTEROL SULFATE 2 PUFF: 90 AEROSOL, METERED RESPIRATORY (INHALATION) at 04:21

## 2018-01-01 RX ADMIN — AMIODARONE HYDROCHLORIDE 200 MG: 200 TABLET ORAL at 17:42

## 2018-01-01 RX ADMIN — METOPROLOL TARTRATE 50 MG: 50 TABLET ORAL at 09:50

## 2018-01-01 RX ADMIN — ASPIRIN 81 MG 81 MG: 81 TABLET ORAL at 09:44

## 2018-01-01 RX ADMIN — Medication 1 CAPSULE: at 08:48

## 2018-01-01 RX ADMIN — PIPERACILLIN SODIUM,TAZOBACTAM SODIUM 3.38 G: 3; .375 INJECTION, POWDER, FOR SOLUTION INTRAVENOUS at 09:52

## 2018-01-01 RX ADMIN — METOPROLOL TARTRATE 50 MG: 50 TABLET ORAL at 10:16

## 2018-01-01 RX ADMIN — ASPIRIN 81 MG 81 MG: 81 TABLET ORAL at 08:40

## 2018-01-01 RX ADMIN — MIDAZOLAM 1 MG: 1 INJECTION INTRAMUSCULAR; INTRAVENOUS at 16:19

## 2018-01-01 RX ADMIN — SERTRALINE HYDROCHLORIDE 25 MG: 50 TABLET ORAL at 21:03

## 2018-01-01 RX ADMIN — IPRATROPIUM BROMIDE 0.5 MG: 0.5 SOLUTION RESPIRATORY (INHALATION) at 21:56

## 2018-01-01 RX ADMIN — FENTANYL CITRATE 25 MCG: 50 INJECTION, SOLUTION INTRAMUSCULAR; INTRAVENOUS at 16:35

## 2018-01-01 RX ADMIN — MIDODRINE HYDROCHLORIDE 10 MG: 5 TABLET ORAL at 17:40

## 2018-01-01 RX ADMIN — METOPROLOL TARTRATE 100 MG: 50 TABLET ORAL at 09:01

## 2018-01-01 RX ADMIN — CLOPIDOGREL BISULFATE 75 MG: 75 TABLET ORAL at 09:43

## 2018-01-01 RX ADMIN — IPRATROPIUM BROMIDE 0.5 MG: 0.5 SOLUTION RESPIRATORY (INHALATION) at 08:10

## 2018-01-01 RX ADMIN — GABAPENTIN 600 MG: 300 CAPSULE ORAL at 09:05

## 2018-01-01 RX ADMIN — IPRATROPIUM BROMIDE 0.5 MG: 0.5 SOLUTION RESPIRATORY (INHALATION) at 19:24

## 2018-01-01 RX ADMIN — IPRATROPIUM BROMIDE 0.5 MG: 0.5 SOLUTION RESPIRATORY (INHALATION) at 07:23

## 2018-01-01 RX ADMIN — ATORVASTATIN CALCIUM 20 MG: 20 TABLET, FILM COATED ORAL at 09:07

## 2018-01-01 RX ADMIN — PIPERACILLIN SODIUM,TAZOBACTAM SODIUM 3.38 G: 3; .375 INJECTION, POWDER, FOR SOLUTION INTRAVENOUS at 23:42

## 2018-01-01 RX ADMIN — Medication 10 ML: at 06:06

## 2018-01-01 RX ADMIN — Medication 2 G: at 05:26

## 2018-01-01 RX ADMIN — Medication 10 ML: at 05:51

## 2018-01-01 RX ADMIN — LIDOCAINE HYDROCHLORIDE AND EPINEPHRINE 30 ML: 10; 10 INJECTION, SOLUTION INFILTRATION; PERINEURAL at 14:37

## 2018-01-01 RX ADMIN — Medication 10 ML: at 22:45

## 2018-01-01 RX ADMIN — PANTOPRAZOLE SODIUM 40 MG: 40 TABLET, DELAYED RELEASE ORAL at 09:32

## 2018-01-01 RX ADMIN — BACITRACIN 50000 UNITS: 5000 INJECTION, POWDER, FOR SOLUTION INTRAMUSCULAR at 15:22

## 2018-01-01 RX ADMIN — METOPROLOL TARTRATE 100 MG: 50 TABLET ORAL at 17:22

## 2018-01-01 RX ADMIN — VANCOMYCIN HYDROCHLORIDE 1000 MG: 1 INJECTION, POWDER, LYOPHILIZED, FOR SOLUTION INTRAVENOUS at 03:54

## 2018-01-01 RX ADMIN — FLUTICASONE FUROATE 1 PUFF: 100 POWDER RESPIRATORY (INHALATION) at 11:44

## 2018-01-01 RX ADMIN — AMIODARONE HYDROCHLORIDE 200 MG: 200 TABLET ORAL at 17:16

## 2018-01-01 RX ADMIN — IPRATROPIUM BROMIDE 0.5 MG: 0.5 SOLUTION RESPIRATORY (INHALATION) at 11:54

## 2018-01-01 RX ADMIN — POTASSIUM CHLORIDE 10 MEQ: 750 TABLET, EXTENDED RELEASE ORAL at 09:01

## 2018-01-01 RX ADMIN — ATORVASTATIN CALCIUM 20 MG: 20 TABLET, FILM COATED ORAL at 09:52

## 2018-01-01 RX ADMIN — AMIODARONE HYDROCHLORIDE 200 MG: 200 TABLET ORAL at 18:01

## 2018-01-01 RX ADMIN — FUROSEMIDE 40 MG: 40 TABLET ORAL at 09:02

## 2018-01-01 RX ADMIN — SERTRALINE HYDROCHLORIDE 25 MG: 50 TABLET ORAL at 22:43

## 2018-01-01 RX ADMIN — Medication 1 CAPSULE: at 08:55

## 2018-01-01 RX ADMIN — METOPROLOL TARTRATE 100 MG: 50 TABLET ORAL at 17:42

## 2018-01-01 RX ADMIN — SERTRALINE HYDROCHLORIDE 25 MG: 50 TABLET ORAL at 21:43

## 2018-01-01 RX ADMIN — MIDODRINE HYDROCHLORIDE 10 MG: 5 TABLET ORAL at 11:38

## 2018-01-01 RX ADMIN — Medication 10 ML: at 21:59

## 2018-01-01 RX ADMIN — SODIUM CHLORIDE 150 ML/HR: 900 INJECTION, SOLUTION INTRAVENOUS at 18:28

## 2018-01-01 RX ADMIN — ATORVASTATIN CALCIUM 20 MG: 20 TABLET, FILM COATED ORAL at 09:02

## 2018-01-01 RX ADMIN — Medication 10 ML: at 23:13

## 2018-01-01 RX ADMIN — LEVALBUTEROL HYDROCHLORIDE 0.63 MG: 0.63 SOLUTION RESPIRATORY (INHALATION) at 21:34

## 2018-01-01 RX ADMIN — FINASTERIDE 5 MG: 5 TABLET, FILM COATED ORAL at 08:49

## 2018-01-01 RX ADMIN — HEPARIN SODIUM 1000 UNITS: 200 INJECTION, SOLUTION INTRAVENOUS at 16:35

## 2018-01-01 RX ADMIN — FUROSEMIDE 40 MG: 40 TABLET ORAL at 08:49

## 2018-01-01 RX ADMIN — AMIODARONE HYDROCHLORIDE 200 MG: 200 TABLET ORAL at 17:40

## 2018-01-01 RX ADMIN — VANCOMYCIN HYDROCHLORIDE 125 MG: 1 INJECTION, POWDER, LYOPHILIZED, FOR SOLUTION INTRAVENOUS at 06:51

## 2018-01-01 RX ADMIN — AMIODARONE HYDROCHLORIDE 200 MG: 200 TABLET ORAL at 09:44

## 2018-01-01 RX ADMIN — FLUTICASONE FUROATE 1 PUFF: 100 POWDER RESPIRATORY (INHALATION) at 10:19

## 2018-01-01 RX ADMIN — MIDODRINE HYDROCHLORIDE 10 MG: 5 TABLET ORAL at 08:45

## 2018-01-01 RX ADMIN — FINASTERIDE 5 MG: 5 TABLET, FILM COATED ORAL at 08:41

## 2018-01-01 RX ADMIN — METOPROLOL TARTRATE 100 MG: 50 TABLET ORAL at 17:25

## 2018-01-01 RX ADMIN — SERTRALINE HYDROCHLORIDE 25 MG: 50 TABLET ORAL at 21:34

## 2018-01-01 RX ADMIN — HEPARIN SODIUM 5000 UNITS: 5000 INJECTION INTRAVENOUS; SUBCUTANEOUS at 06:03

## 2018-01-01 RX ADMIN — PANTOPRAZOLE SODIUM 40 MG: 40 TABLET, DELAYED RELEASE ORAL at 09:07

## 2018-01-01 RX ADMIN — METOPROLOL TARTRATE 100 MG: 50 TABLET ORAL at 08:55

## 2018-01-01 RX ADMIN — AMIODARONE HYDROCHLORIDE 200 MG: 200 TABLET ORAL at 18:05

## 2018-01-01 RX ADMIN — LEVOTHYROXINE SODIUM 50 MCG: 50 TABLET ORAL at 05:34

## 2018-01-01 RX ADMIN — LEVALBUTEROL HYDROCHLORIDE 0.63 MG: 0.63 SOLUTION RESPIRATORY (INHALATION) at 21:56

## 2018-01-01 RX ADMIN — ATORVASTATIN CALCIUM 20 MG: 20 TABLET, FILM COATED ORAL at 09:50

## 2018-01-01 RX ADMIN — Medication 10 ML: at 05:12

## 2018-01-01 RX ADMIN — VANCOMYCIN HYDROCHLORIDE 125 MG: 1 INJECTION, POWDER, LYOPHILIZED, FOR SOLUTION INTRAVENOUS at 00:01

## 2018-01-01 RX ADMIN — DONEPEZIL HYDROCHLORIDE 10 MG: 5 TABLET, FILM COATED ORAL at 21:41

## 2018-01-01 RX ADMIN — METOPROLOL TARTRATE 100 MG: 50 TABLET ORAL at 17:46

## 2018-01-01 RX ADMIN — FINASTERIDE 5 MG: 5 TABLET, FILM COATED ORAL at 10:17

## 2018-01-01 RX ADMIN — FLUTICASONE FUROATE 1 PUFF: 100 POWDER RESPIRATORY (INHALATION) at 08:46

## 2018-01-01 RX ADMIN — METOPROLOL TARTRATE 50 MG: 50 TABLET ORAL at 08:30

## 2018-01-01 RX ADMIN — Medication 10 ML: at 20:22

## 2018-01-01 RX ADMIN — FINASTERIDE 5 MG: 5 TABLET, FILM COATED ORAL at 09:02

## 2018-01-01 RX ADMIN — DONEPEZIL HYDROCHLORIDE 10 MG: 5 TABLET, FILM COATED ORAL at 22:44

## 2018-01-01 RX ADMIN — PANTOPRAZOLE SODIUM 40 MG: 40 TABLET, DELAYED RELEASE ORAL at 08:48

## 2018-01-01 RX ADMIN — IPRATROPIUM BROMIDE 0.5 MG: 0.5 SOLUTION RESPIRATORY (INHALATION) at 13:39

## 2018-01-01 RX ADMIN — Medication 10 ML: at 13:21

## 2018-01-01 RX ADMIN — PANTOPRAZOLE SODIUM 40 MG: 40 TABLET, DELAYED RELEASE ORAL at 08:18

## 2018-01-01 RX ADMIN — MIDODRINE HYDROCHLORIDE 10 MG: 5 TABLET ORAL at 17:22

## 2018-01-01 RX ADMIN — LEVALBUTEROL HYDROCHLORIDE 0.63 MG: 0.63 SOLUTION RESPIRATORY (INHALATION) at 16:25

## 2018-01-01 RX ADMIN — FLUTICASONE FUROATE 1 PUFF: 100 POWDER RESPIRATORY (INHALATION) at 09:06

## 2018-01-01 RX ADMIN — AMIODARONE HYDROCHLORIDE 200 MG: 200 TABLET ORAL at 17:01

## 2018-01-01 RX ADMIN — AMIODARONE HYDROCHLORIDE 200 MG: 200 TABLET ORAL at 17:26

## 2018-01-01 RX ADMIN — VANCOMYCIN HYDROCHLORIDE 125 MG: 1 INJECTION, POWDER, LYOPHILIZED, FOR SOLUTION INTRAVENOUS at 23:12

## 2018-01-01 RX ADMIN — MIDAZOLAM 1 MG: 1 INJECTION INTRAMUSCULAR; INTRAVENOUS at 14:19

## 2018-01-01 RX ADMIN — CLOPIDOGREL BISULFATE 75 MG: 75 TABLET ORAL at 09:02

## 2018-01-01 RX ADMIN — FUROSEMIDE 40 MG: 40 TABLET ORAL at 10:16

## 2018-01-01 RX ADMIN — PIPERACILLIN SODIUM,TAZOBACTAM SODIUM 3.38 G: 3; .375 INJECTION, POWDER, FOR SOLUTION INTRAVENOUS at 10:18

## 2018-01-01 RX ADMIN — AMIODARONE HYDROCHLORIDE 200 MG: 200 TABLET ORAL at 09:02

## 2018-01-01 RX ADMIN — SERTRALINE HYDROCHLORIDE 25 MG: 50 TABLET ORAL at 21:41

## 2018-01-01 RX ADMIN — IPRATROPIUM BROMIDE 0.5 MG: 0.5 SOLUTION RESPIRATORY (INHALATION) at 22:49

## 2018-01-01 RX ADMIN — Medication 10 ML: at 05:46

## 2018-01-01 RX ADMIN — LEVOFLOXACIN 750 MG: 5 INJECTION, SOLUTION INTRAVENOUS at 19:21

## 2018-01-01 RX ADMIN — AMIODARONE HYDROCHLORIDE 200 MG: 200 TABLET ORAL at 17:22

## 2018-01-01 RX ADMIN — AMIODARONE HYDROCHLORIDE 200 MG: 200 TABLET ORAL at 20:21

## 2018-01-01 RX ADMIN — IPRATROPIUM BROMIDE 0.5 MG: 0.5 SOLUTION RESPIRATORY (INHALATION) at 08:38

## 2018-01-01 RX ADMIN — LEVALBUTEROL HYDROCHLORIDE 0.63 MG: 0.63 SOLUTION RESPIRATORY (INHALATION) at 14:28

## 2018-01-01 RX ADMIN — Medication 1 CAPSULE: at 08:45

## 2018-01-01 RX ADMIN — METOPROLOL TARTRATE 50 MG: 50 TABLET ORAL at 21:44

## 2018-01-01 RX ADMIN — Medication 10 ML: at 21:38

## 2018-01-01 RX ADMIN — FERROUS SULFATE TAB 325 MG (65 MG ELEMENTAL FE) 325 MG: 325 (65 FE) TAB at 09:06

## 2018-01-01 RX ADMIN — LEVALBUTEROL HYDROCHLORIDE 0.63 MG: 0.63 SOLUTION RESPIRATORY (INHALATION) at 14:55

## 2018-01-01 RX ADMIN — PIPERACILLIN SODIUM,TAZOBACTAM SODIUM 3.38 G: 3; .375 INJECTION, POWDER, FOR SOLUTION INTRAVENOUS at 00:25

## 2018-01-01 RX ADMIN — ATORVASTATIN CALCIUM 20 MG: 20 TABLET, FILM COATED ORAL at 08:48

## 2018-01-01 RX ADMIN — VANCOMYCIN HYDROCHLORIDE 125 MG: 1 INJECTION, POWDER, LYOPHILIZED, FOR SOLUTION INTRAVENOUS at 06:20

## 2018-01-01 RX ADMIN — HEPARIN SODIUM 5000 UNITS: 5000 INJECTION INTRAVENOUS; SUBCUTANEOUS at 22:41

## 2018-01-01 RX ADMIN — FUROSEMIDE 40 MG: 40 TABLET ORAL at 08:30

## 2018-01-01 RX ADMIN — LEVALBUTEROL HYDROCHLORIDE 0.63 MG: 0.63 SOLUTION RESPIRATORY (INHALATION) at 08:29

## 2018-01-01 RX ADMIN — Medication 10 ML: at 14:58

## 2018-01-01 RX ADMIN — FUROSEMIDE 20 MG: 10 INJECTION, SOLUTION INTRAMUSCULAR; INTRAVENOUS at 20:19

## 2018-01-01 RX ADMIN — FERROUS SULFATE TAB 325 MG (65 MG ELEMENTAL FE) 325 MG: 325 (65 FE) TAB at 08:41

## 2018-01-01 RX ADMIN — ASPIRIN 81 MG 81 MG: 81 TABLET ORAL at 09:13

## 2018-01-01 RX ADMIN — FINASTERIDE 5 MG: 5 TABLET, FILM COATED ORAL at 09:14

## 2018-01-01 RX ADMIN — AMIODARONE HYDROCHLORIDE 200 MG: 200 TABLET ORAL at 08:45

## 2018-01-01 RX ADMIN — Medication 10 ML: at 00:00

## 2018-01-01 RX ADMIN — POTASSIUM CHLORIDE 40 MEQ: 750 TABLET, EXTENDED RELEASE ORAL at 17:42

## 2018-01-01 RX ADMIN — Medication 10 ML: at 21:34

## 2018-01-01 RX ADMIN — AMIODARONE HYDROCHLORIDE 200 MG: 200 TABLET ORAL at 09:11

## 2018-01-01 RX ADMIN — AMIODARONE HYDROCHLORIDE 200 MG: 200 TABLET ORAL at 18:49

## 2018-01-01 RX ADMIN — METOPROLOL TARTRATE 100 MG: 50 TABLET ORAL at 09:14

## 2018-01-01 RX ADMIN — ASPIRIN 81 MG 81 MG: 81 TABLET ORAL at 09:05

## 2018-01-01 RX ADMIN — MIDAZOLAM HYDROCHLORIDE 1 MG: 1 INJECTION, SOLUTION INTRAMUSCULAR; INTRAVENOUS at 16:37

## 2018-01-01 RX ADMIN — LEVOTHYROXINE SODIUM 50 MCG: 50 TABLET ORAL at 05:19

## 2018-01-01 RX ADMIN — ASPIRIN 81 MG 81 MG: 81 TABLET ORAL at 09:02

## 2018-01-01 RX ADMIN — MIDAZOLAM HYDROCHLORIDE 1 MG: 1 INJECTION, SOLUTION INTRAMUSCULAR; INTRAVENOUS at 16:36

## 2018-01-01 RX ADMIN — METOPROLOL TARTRATE 50 MG: 50 TABLET ORAL at 15:26

## 2018-01-01 RX ADMIN — ATORVASTATIN CALCIUM 20 MG: 20 TABLET, FILM COATED ORAL at 09:14

## 2018-01-01 RX ADMIN — LEVALBUTEROL HYDROCHLORIDE 0.63 MG: 0.63 SOLUTION RESPIRATORY (INHALATION) at 21:30

## 2018-01-01 RX ADMIN — FERROUS SULFATE TAB 325 MG (65 MG ELEMENTAL FE) 325 MG: 325 (65 FE) TAB at 10:17

## 2018-01-01 RX ADMIN — MIDAZOLAM HYDROCHLORIDE 1 MG: 1 INJECTION, SOLUTION INTRAMUSCULAR; INTRAVENOUS at 17:15

## 2018-01-01 RX ADMIN — FENTANYL CITRATE 25 MCG: 50 INJECTION, SOLUTION INTRAMUSCULAR; INTRAVENOUS at 16:19

## 2018-01-01 RX ADMIN — AMIODARONE HYDROCHLORIDE 200 MG: 200 TABLET ORAL at 08:40

## 2018-01-01 RX ADMIN — VANCOMYCIN HYDROCHLORIDE 125 MG: 1 INJECTION, POWDER, LYOPHILIZED, FOR SOLUTION INTRAVENOUS at 16:46

## 2018-01-01 RX ADMIN — FINASTERIDE 5 MG: 5 TABLET, FILM COATED ORAL at 09:43

## 2018-01-01 RX ADMIN — CLOPIDOGREL BISULFATE 75 MG: 75 TABLET ORAL at 10:17

## 2018-01-01 RX ADMIN — FUROSEMIDE 40 MG: 40 TABLET ORAL at 18:05

## 2018-01-01 RX ADMIN — LEVALBUTEROL HYDROCHLORIDE 0.63 MG: 0.63 SOLUTION RESPIRATORY (INHALATION) at 22:48

## 2018-01-01 RX ADMIN — METOPROLOL TARTRATE 50 MG: 50 TABLET ORAL at 17:16

## 2018-01-01 RX ADMIN — SERTRALINE HYDROCHLORIDE 25 MG: 50 TABLET ORAL at 22:44

## 2018-01-01 RX ADMIN — Medication 10 ML: at 21:43

## 2018-01-01 RX ADMIN — DILTIAZEM HYDROCHLORIDE 30 MG: 30 TABLET, FILM COATED ORAL at 17:37

## 2018-01-01 RX ADMIN — ACETAMINOPHEN 650 MG: 325 TABLET ORAL at 09:12

## 2018-01-01 RX ADMIN — AMIODARONE HYDROCHLORIDE 200 MG: 200 TABLET ORAL at 09:07

## 2018-01-01 RX ADMIN — PANTOPRAZOLE SODIUM 40 MG: 40 TABLET, DELAYED RELEASE ORAL at 09:02

## 2018-01-01 RX ADMIN — MIDAZOLAM HYDROCHLORIDE 1 MG: 1 INJECTION, SOLUTION INTRAMUSCULAR; INTRAVENOUS at 14:37

## 2018-01-01 RX ADMIN — POTASSIUM CHLORIDE 10 MEQ: 750 TABLET, EXTENDED RELEASE ORAL at 09:07

## 2018-01-01 RX ADMIN — Medication 10 ML: at 05:04

## 2018-01-01 RX ADMIN — VANCOMYCIN HYDROCHLORIDE 125 MG: 1 INJECTION, POWDER, LYOPHILIZED, FOR SOLUTION INTRAVENOUS at 17:26

## 2018-01-01 RX ADMIN — METOPROLOL TARTRATE 50 MG: 50 TABLET ORAL at 10:17

## 2018-01-01 RX ADMIN — LEVOTHYROXINE SODIUM 50 MCG: 50 TABLET ORAL at 06:51

## 2018-01-01 RX ADMIN — METOPROLOL TARTRATE 50 MG: 50 TABLET ORAL at 09:52

## 2018-01-01 RX ADMIN — DONEPEZIL HYDROCHLORIDE 10 MG: 5 TABLET, FILM COATED ORAL at 22:43

## 2018-01-01 RX ADMIN — FENTANYL CITRATE 25 MCG: 50 INJECTION, SOLUTION INTRAMUSCULAR; INTRAVENOUS at 14:37

## 2018-01-01 RX ADMIN — METOPROLOL TARTRATE 50 MG: 50 TABLET ORAL at 20:21

## 2018-01-01 RX ADMIN — HEPARIN SODIUM 5000 UNITS: 5000 INJECTION INTRAVENOUS; SUBCUTANEOUS at 21:02

## 2018-01-01 RX ADMIN — ASPIRIN 81 MG 81 MG: 81 TABLET ORAL at 08:55

## 2018-01-01 RX ADMIN — PANTOPRAZOLE SODIUM 40 MG: 40 TABLET, DELAYED RELEASE ORAL at 08:55

## 2018-01-01 RX ADMIN — GABAPENTIN 600 MG: 300 CAPSULE ORAL at 21:46

## 2018-01-01 RX ADMIN — POTASSIUM CHLORIDE 10 MEQ: 750 TABLET, EXTENDED RELEASE ORAL at 08:45

## 2018-01-01 RX ADMIN — ASPIRIN 81 MG 81 MG: 81 TABLET ORAL at 09:07

## 2018-01-01 RX ADMIN — FUROSEMIDE 40 MG: 10 INJECTION, SOLUTION INTRAMUSCULAR; INTRAVENOUS at 09:43

## 2018-01-01 RX ADMIN — Medication 10 ML: at 13:39

## 2018-01-01 RX ADMIN — CLOPIDOGREL BISULFATE 75 MG: 75 TABLET ORAL at 09:31

## 2018-01-01 RX ADMIN — Medication 1 CAPSULE: at 09:14

## 2018-01-01 RX ADMIN — ATORVASTATIN CALCIUM 20 MG: 20 TABLET, FILM COATED ORAL at 08:55

## 2018-01-01 RX ADMIN — DONEPEZIL HYDROCHLORIDE 10 MG: 5 TABLET, FILM COATED ORAL at 21:59

## 2018-01-01 RX ADMIN — METOPROLOL TARTRATE 100 MG: 50 TABLET ORAL at 17:40

## 2018-01-01 RX ADMIN — HEPARIN SODIUM 5000 UNITS: 5000 INJECTION INTRAVENOUS; SUBCUTANEOUS at 12:55

## 2018-01-01 RX ADMIN — AMIODARONE HYDROCHLORIDE 200 MG: 200 TABLET ORAL at 17:46

## 2018-01-01 RX ADMIN — FLUTICASONE FUROATE 1 PUFF: 100 POWDER RESPIRATORY (INHALATION) at 08:56

## 2018-01-01 RX ADMIN — Medication 10 ML: at 03:04

## 2018-01-01 RX ADMIN — VANCOMYCIN HYDROCHLORIDE 125 MG: 1 INJECTION, POWDER, LYOPHILIZED, FOR SOLUTION INTRAVENOUS at 05:56

## 2018-01-01 RX ADMIN — LEVALBUTEROL HYDROCHLORIDE 0.63 MG: 0.63 SOLUTION RESPIRATORY (INHALATION) at 20:24

## 2018-01-01 RX ADMIN — FLUTICASONE FUROATE 1 PUFF: 200 POWDER RESPIRATORY (INHALATION) at 09:06

## 2018-01-01 RX ADMIN — FERROUS SULFATE TAB 325 MG (65 MG ELEMENTAL FE) 325 MG: 325 (65 FE) TAB at 08:18

## 2018-01-01 RX ADMIN — METOPROLOL TARTRATE 50 MG: 50 TABLET ORAL at 16:54

## 2018-01-01 RX ADMIN — CLOPIDOGREL BISULFATE 75 MG: 75 TABLET ORAL at 16:13

## 2018-01-01 RX ADMIN — GABAPENTIN 600 MG: 300 CAPSULE ORAL at 17:37

## 2018-01-01 RX ADMIN — Medication 10 ML: at 21:12

## 2018-01-01 RX ADMIN — FLUTICASONE FUROATE 1 PUFF: 100 POWDER RESPIRATORY (INHALATION) at 09:16

## 2018-01-01 RX ADMIN — AMIODARONE HYDROCHLORIDE 200 MG: 200 TABLET ORAL at 10:16

## 2018-01-01 RX ADMIN — FINASTERIDE 5 MG: 5 TABLET, FILM COATED ORAL at 09:32

## 2018-01-01 RX ADMIN — SERTRALINE HYDROCHLORIDE 25 MG: 50 TABLET ORAL at 21:44

## 2018-01-01 RX ADMIN — METOPROLOL TARTRATE 50 MG: 50 TABLET ORAL at 18:01

## 2018-01-01 RX ADMIN — IPRATROPIUM BROMIDE 0.5 MG: 0.5 SOLUTION RESPIRATORY (INHALATION) at 20:07

## 2018-01-01 RX ADMIN — MIDODRINE HYDROCHLORIDE 10 MG: 5 TABLET ORAL at 12:26

## 2018-01-01 RX ADMIN — DONEPEZIL HYDROCHLORIDE 10 MG: 5 TABLET, FILM COATED ORAL at 22:39

## 2018-01-01 RX ADMIN — CLOPIDOGREL BISULFATE 75 MG: 75 TABLET ORAL at 10:16

## 2018-01-01 RX ADMIN — FUROSEMIDE 40 MG: 40 TABLET ORAL at 09:07

## 2018-01-01 RX ADMIN — METOPROLOL TARTRATE 50 MG: 50 TABLET ORAL at 09:43

## 2018-01-01 RX ADMIN — LEVOTHYROXINE SODIUM 50 MCG: 50 TABLET ORAL at 05:12

## 2018-01-01 RX ADMIN — AMIODARONE HYDROCHLORIDE 200 MG: 200 TABLET ORAL at 08:30

## 2018-01-01 RX ADMIN — IPRATROPIUM BROMIDE 0.5 MG: 0.5 SOLUTION RESPIRATORY (INHALATION) at 14:26

## 2018-01-01 RX ADMIN — MIDODRINE HYDROCHLORIDE 2.5 MG: 5 TABLET ORAL at 17:25

## 2018-01-01 RX ADMIN — MIDAZOLAM HYDROCHLORIDE 1 MG: 1 INJECTION, SOLUTION INTRAMUSCULAR; INTRAVENOUS at 16:19

## 2018-01-01 RX ADMIN — PANTOPRAZOLE SODIUM 40 MG: 40 TABLET, DELAYED RELEASE ORAL at 08:46

## 2018-01-01 RX ADMIN — FUROSEMIDE 40 MG: 40 TABLET ORAL at 08:46

## 2018-01-01 RX ADMIN — FERROUS SULFATE TAB 325 MG (65 MG ELEMENTAL FE) 325 MG: 325 (65 FE) TAB at 09:50

## 2018-01-01 RX ADMIN — ATORVASTATIN CALCIUM 20 MG: 20 TABLET, FILM COATED ORAL at 10:17

## 2018-01-01 RX ADMIN — FERROUS SULFATE TAB 325 MG (65 MG ELEMENTAL FE) 325 MG: 325 (65 FE) TAB at 09:14

## 2018-01-01 RX ADMIN — ATORVASTATIN CALCIUM 20 MG: 20 TABLET, FILM COATED ORAL at 09:11

## 2018-01-01 RX ADMIN — IPRATROPIUM BROMIDE AND ALBUTEROL SULFATE 3 ML: .5; 3 SOLUTION RESPIRATORY (INHALATION) at 08:32

## 2018-01-01 RX ADMIN — FLUTICASONE FUROATE 1 PUFF: 100 POWDER RESPIRATORY (INHALATION) at 08:41

## 2018-01-01 RX ADMIN — Medication 10 ML: at 09:01

## 2018-01-01 RX ADMIN — METOPROLOL TARTRATE 100 MG: 50 TABLET ORAL at 17:54

## 2018-01-01 RX ADMIN — AMIODARONE HYDROCHLORIDE 200 MG: 200 TABLET ORAL at 09:52

## 2018-01-01 RX ADMIN — PANTOPRAZOLE SODIUM 40 MG: 40 TABLET, DELAYED RELEASE ORAL at 08:41

## 2018-01-01 RX ADMIN — LEVALBUTEROL HYDROCHLORIDE 0.63 MG: 0.63 SOLUTION RESPIRATORY (INHALATION) at 13:10

## 2018-01-01 RX ADMIN — FERROUS SULFATE TAB 325 MG (65 MG ELEMENTAL FE) 325 MG: 325 (65 FE) TAB at 09:44

## 2018-01-01 RX ADMIN — AMIODARONE HYDROCHLORIDE 200 MG: 200 TABLET ORAL at 17:30

## 2018-01-01 RX ADMIN — Medication 10 ML: at 17:38

## 2018-01-01 RX ADMIN — FUROSEMIDE 40 MG: 40 TABLET ORAL at 09:13

## 2018-01-01 RX ADMIN — MIDODRINE HYDROCHLORIDE 10 MG: 5 TABLET ORAL at 08:40

## 2018-01-01 RX ADMIN — HEPARIN SODIUM 5000 UNITS: 5000 INJECTION INTRAVENOUS; SUBCUTANEOUS at 03:03

## 2018-01-01 RX ADMIN — Medication 10 ML: at 14:40

## 2018-01-01 RX ADMIN — LEVOTHYROXINE SODIUM 50 MCG: 50 TABLET ORAL at 05:50

## 2018-01-01 RX ADMIN — METOPROLOL TARTRATE 100 MG: 50 TABLET ORAL at 08:48

## 2018-01-01 RX ADMIN — FINASTERIDE 5 MG: 5 TABLET, FILM COATED ORAL at 09:07

## 2018-01-01 RX ADMIN — LEVALBUTEROL HYDROCHLORIDE 0.63 MG: 0.63 SOLUTION RESPIRATORY (INHALATION) at 08:27

## 2018-01-01 RX ADMIN — Medication 10 ML: at 05:26

## 2018-01-01 RX ADMIN — Medication 2 G: at 21:47

## 2018-01-01 RX ADMIN — LIDOCAINE HYDROCHLORIDE AND EPINEPHRINE 20 ML: 10; 10 INJECTION, SOLUTION INFILTRATION; PERINEURAL at 16:34

## 2018-01-01 RX ADMIN — FERROUS SULFATE TAB 325 MG (65 MG ELEMENTAL FE) 325 MG: 325 (65 FE) TAB at 09:31

## 2018-01-01 RX ADMIN — METOPROLOL TARTRATE 50 MG: 50 TABLET ORAL at 18:46

## 2018-01-01 RX ADMIN — DONEPEZIL HYDROCHLORIDE 10 MG: 5 TABLET, FILM COATED ORAL at 21:46

## 2018-01-01 RX ADMIN — FLUTICASONE FUROATE 1 PUFF: 100 POWDER RESPIRATORY (INHALATION) at 14:48

## 2018-05-11 NOTE — MR AVS SNAPSHOT
[address] [facility], 
[address], [address] [facility] 
[phone] Patient: Arvind Martinez MRN: [id number] EJR:[birth year] Visit Information Date & Time Provider Department Dept. Phone Encounter #  
 5/11/2018 11:40 AM Aysha Broussard MD Neurology Clinic at [facility] [phone] [id number] Follow-up Instructions Return in about 6 months (around 11/11/2018). Upcoming Health Maintenance Date Due DTaP/Tdap/Td series (1 - Tdap) 6/2/1952 ZOSTER VACCINE AGE 60> 4/2/1991 GLAUCOMA SCREENING Q2Y 6/2/1996 Pneumococcal 65+ High/Highest Risk (1 of 2 - PCV13) 6/2/1996 MEDICARE YEARLY EXAM 3/14/2018 Influenza Age 5 to Adult 8/1/2018 Allergies as of 5/11/2018  Review Complete On: 5/11/2018 By: Aysha Broussard MD  
  
 Severity Noted Reaction Type Reactions Valium [Diazepam]  12/11/2012    Other (comments) Current Immunizations  Never Reviewed No immunizations on file. Not reviewed this visit You Were Diagnosed With   
  
 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  
  
   
This list is accurate as of 5/11/18 12:47 PM.  Always use your most recent med list.  
  
  
  
  
 atorvastatin 20 mg tablet Commonly known as:  LIPITOR Take  by mouth daily. CLARINEX 5 mg tablet Generic drug:  desloratadine Take 5 mg by mouth daily. clopidogrel 75 mg Tab Commonly known as:  PLAVIX Take 1 Tab by mouth daily. donepezil 10 mg tablet Commonly known as:  ARICEPT Take 1 Tab by mouth nightly.  
  
 gabapentin 600 mg tablet Commonly known as:  NEURONTIN Take 1 Tab by mouth four (4) times daily. LASIX 40 mg tablet Generic drug:  furosemide Take  by mouth two (2) times a day. * loperamide 2 mg tablet Commonly known as:  IMMODIUM Take 2 mg by mouth four (4) times daily as needed for Diarrhea. * loperamide 2 mg capsule Commonly known as:  IMODIUM  
  
 omeprazole 20 mg capsule Commonly known as:  PRILOSEC Take 20 mg by mouth daily (after lunch). potassium chloride SR 10 mEq tablet Commonly known as:  KLOR-CON 10 Take 10 mEq by mouth two (2) times a day. sertraline 25 mg tablet Commonly known as:  ZOLOFT Take 25 mg by mouth nightly. XANAX 0.5 mg tablet Generic drug:  ALPRAZolam  
Take 0.5 mg by mouth three (3) times daily as needed for Anxiety. * Notice: This list has 2 medication(s) that are the same as other medications prescribed for you. Read the directions carefully, and ask your doctor or other care provider to review them with you. We Performed the Following GABAPENTIN [id number] CPT(R)] Follow-up Instructions Return in about 6 months (around 11/11/2018). To-Do List   
 05/17/2018 Imaging:  DUPLEX CAROTID BILATERAL AMB NEURO   
  
 05/17/2018 Imaging:  MRA BRAIN WO CONT   
  
 05/17/2018 Imaging:  MRI BRAIN W WO CONT Patient Instructions Office Policies 
o Phone calls/patient messages: Please allow up to 24 hours for someone in the office to contact you about your call or message. Be mindful your provider may be out of the office or your message may require further review. We encourage you to use [employer] for your messages as this is a faster, more efficient way to communicate with our office 
o Medication Refills: 
Prescription medications require up to 48 business hours to process. We encourage you to use [employer] for your refills. For controlled medications: Please allow up to 72 business hours to process. Certain medications may require you to  a written prescription at our office. NO narcotic/controlled medications will be prescribed after 4pm Monday through Friday or on weekends 
o Form/Paperwork Completion: 
Please note there is a $25 fee for all paperwork completed by our providers. We ask that you allow 7-14 business days. Pre-payment is due prior to picking up/faxing the completed form. You may also download your forms to [employer] to have your doctor print off. 
o Test Results: In order for a patient to obtain test results, an appointment must be made with the physician to review the results. Test results cannot be discussed over the phone. Introducing [facility] & HEALTH SERVICES! Mariel Ceron introduces [employer] patient portal. Now you can access parts of your medical record, email your doctor's office, and request medication refills online. 1. In your internet browser, go to [email or web address] 2. Click on the First Time User? Click Here link in the Sign In box. You will see the New Member Sign Up page. 3. Enter your [employer] Access Code exactly as it appears below. You will not need to use this code after youve completed the sign-up process. If you do not sign up before the expiration date, you must request a new code. · [employer] Access Code: [email or web address] Expires: 8/9/2018 11:14 AM 
 
 4. Enter the last four digits of your Social Security Number (xxxx) and Date of Birth (mm/dd/yyyy) as indicated and click Submit. You will be taken to the next sign-up page. 5. Create a [employer] ID. This will be your [employer] login ID and cannot be changed, so think of one that is secure and easy to remember. 6. Create a [employer] password. You can change your password at any time. 7. Enter your Password Reset Question and Answer. This can be used at a later time if you forget your password. 8. Enter your e-mail address. You will receive e-mail notification when new information is available in [address] 9. Click Sign Up. You can now view and download portions of your medical record. 10. Click the Download Summary menu link to download a portable copy of your medical information. If you have questions, please visit the Frequently Asked Questions section of the [employer] website. Remember, [employer] is NOT to be used for urgent needs. For medical emergencies, dial 911. Now available from your iPhone and Android! Please provide this summary of care documentation to your next provider. Your primary care clinician is listed as [facility] [place]. If you have any questions after today's visit, please call [phone].

## 2018-05-11 NOTE — PROGRESS NOTES
Consult    Subjective:     Tory Fenton is a 80 y.o. right hand  male seen for evaluation at the request of Dr. Caterina Botello of new problem of worsening dizziness and spells of loss of balance worse when bending over and increasing pressure sensation in the bifrontal head regions and back of the head and memory loss. 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 He has not had any recent MRI scans of the brain or carotid Dopplers or MRA of the brain, so we will reorder all these, because his previous workup and evaluation was done by another neurologist.  He has mild vestibular vertigo seem to be more positional, and I suspect that this is the cause of his dizziness. I would need to rule out VBI, basal artery disease, or other structural lesions of the brain in view of the progressive pressure in his head and dizziness. Carotid Doppler studies also ordered. Patient's wife does not think his mental status has worsened and he seems to be stable on Aricept. He has chronic ataxia, that has not really changed either. He was last seen a year and a half ago and returns now for worsening symptoms. Patient is on Aricept and his wife says he is doing well on that medication and does not need any new medications. His memory loss does not seem to be progressing at this time. His dizziness is worse and still persistent and he needs a cane to ambulate outside the house. Sophie Lewis His carotid Doppler studies done in June 2015 showed no significant blockages and was normal for age. He has a history of mini strokes according to the wife, with these being found on an abnormal MRI in the past under the care of his previous neurologist. Patient has not had any clear focal weakness, sensory loss, gait problems, visual difficulty or strokelike symptoms.  We checked a carotid Doppler, sedimentation rate to rule out temporal arteritis, and an EEG because of headaches and memory loss and they were okay. Patient is on Aricept 10 memory, and not anxious to increase his medications. Patient also seen for numbness and weakness in his legs. Patient had a history of Guillain-[place] syndrome in 1990 and then several years later with a recurrent spell. He has persistent numbness and tingling and burning pain in his feet, and slight difficulty with weakness and balance problems, and uses a cane for ambulation and has had no progression of symptoms since his last visit a year ago. Recommended he continue multivitamins and vitamin D on a regular basis. He had a history of TIA and stroke in the past. He had no other new neurological symptoms, no stroke like symptoms, no new numbness or weakness or visual problems or new gait problems, or problems with his bowel or bladder. He takes Neurontin 600 mg 3-4 times a day for his neuropathic pain. Complete review of systems and symptoms he has had no new medical problems, complications or illnesses. 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     Past Medical History:   Diagnosis Date    Cancer [facility]) 2005    throat Cancer; laser surgery, no chemo, 30 radiation Tx, breast ca    Chronic kidney disease     stage IV:   Dr Ayse Rosenthal ([phone])    Chronic obstructive pulmonary disease ([facility] [address]     Dysphagia 8/11/2015    GERD (gastroesophageal reflux disease)     Guillain-[place] ([facility] [address] 1990    as of 8/3/15 pt has aching/weakness in legs: Dr Reuben Frankel History of heart attack 1981    Dr Michael Valdez (hard of hearing)     doesn't wear hearing aides    Hypercholesteremia     Hypertension     Personal history of colonic polyps 8/11/2015    Senile dementia     Dr Gm Witt      Past Surgical History:   Procedure Laterality Date    BREAST SURGERY PROCEDURE UNLISTED      removed breast cancer    COLONOSCOPY N/A 11/10/2016    COLONOSCOPY performed by Ovi Beckford [facility] at [facility]  8/11/2015         COLONOSCOPY,DIAGNOSTIC  11/10/2016         Maureen Alatorre  8/11/2015         HX APPENDECTOMY      HX OTHER SURGICAL  2005    throat cancer removal    HX OTHER SURGICAL  2005    radiation treatments     Family History   Problem Relation Age of Onset    Heart Disease Mother     Other Father      blood poisoning    Heart Disease Sister     Cancer Sister      breast    Cancer Child      breast    High Cholesterol Child     Heart defect Child       Social History   Substance Use Topics    Smoking status: Former Smoker     Packs/day: 1.50     Years: 50.00     Quit date: 7/1/2004    Smokeless tobacco: Never Used    Alcohol use No       Current Outpatient Prescriptions   Medication Sig Dispense Refill    loperamide (IMODIUM) 2 mg capsule       loperamide (IMMODIUM) 2 mg tablet Take 2 mg by mouth four (4) times daily as needed for Diarrhea.  ALPRAZolam (XANAX) 0.5 mg tablet Take 0.5 mg by mouth three (3) times daily as needed for Anxiety.  donepezil (ARICEPT) 10 mg tablet Take 1 Tab by mouth nightly. 90 Tab 3    clopidogrel (PLAVIX) 75 mg tab Take 1 Tab by mouth daily. 90 Tab 3    gabapentin (NEURONTIN) 600 mg tablet Take 1 Tab by mouth four (4) times daily. 360 Tab 3    atorvastatin (LIPITOR) 20 mg tablet Take  by mouth daily.  sertraline (ZOLOFT) 25 mg tablet Take 25 mg by mouth nightly.  omeprazole (PRILOSEC) 20 mg capsule Take 20 mg by mouth daily (after lunch).  furosemide (LASIX) 40 mg tablet Take  by mouth two (2) times a day.  potassium chloride SR (KLOR-CON 10) 10 mEq tablet Take 10 mEq by mouth two (2) times a day.  desloratadine (CLARINEX) 5 mg tablet Take 5 mg by mouth daily.             Allergies   Allergen Reactions    Valium [Diazepam] Other (comments)        Review of Systems:  A comprehensive review of systems was negative except for: Constitutional: positive for fatigue and malaise  Gastrointestinal: positive for dyspepsia and reflux symptoms  Musculoskeletal: positive for myalgias, arthralgias, stiff joints and muscle weakness  Neurological: positive for memory problems, paresthesia, coordination problems, gait problems and weakness  Behvioral/Psych: positive for dementia, anxiety and depression   Vitals:    05/11/18 1138   BP: 140/80   Pulse: 75   Resp: 12   Temp: 98 °F (36.7 °C)   SpO2: 98%   Weight: 160 lb (72.6 kg)   Height: 5' 7\" (1.702 m)     Objective:     I      NEUROLOGICAL EXAM:    Appearance: The patient is thinly developed and nourished, provides a fair history and is in no acute distress. Mental Status: Oriented to year and president, place and person not day of the month, or month. Patient cannot do serial sevens or spell the word world backwards. Mood and affect appropriate. Speech is fluent   Cranial Nerves:   Intact visual fields. Fundi are benign. Pupils anisocoric after cataract surgery, but do react, EOM's full, no nystagmus, no ptosis. Facial sensation is normal. Corneal reflexes are not tested. Facial movement is symmetric. Hearing is abnormal bilaterally. Palate is midline with normal sternocleidomastoid and trapezius muscles are normal. Tongue is midline. Neck without meningismus or bruits   Motor:  4/5 strength in upper and lower proximal and distal muscles. Normal bulk and tone. No fasciculations. Reflexes:   Deep tendon reflexes 1+/4 and symmetrical.  No babinski or clonus present  On Hallpike Nyasia maneuver the patient does develop positional vertigo   Sensory:   Abnormal to touch, pinprick and DSS, and vibration and temperature decreased in both feet. Gait:  Abnormal gait that is slightly wide-base and unsteady, and when outside the home uses a cane outside . Tremor:   No tremor noted. Cerebellar:  Abnormal Romberg and tandem cerebellar signs present.    Neurovascular:  Normal heart sounds and regular rhythm, peripheral pulses decreased in both feet, and no carotid bruits. Assessment:       Plan:     Patient with worsening dizziness and pressure sensation in his head, we will repeat MRI scan, MRA of the brain, and carotid Doppler studies to rule out treatable causes of his symptoms. 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  Patient will continue his Plavix for the strokes, his Aricept for memory loss, and his Neurontin one twice a day and 2 at night for his residual painful sensory symptoms from his Guillain-Barré  Patient had unremarkable carotid Doppler exam because of the dizziness and balance, and gait and EEG and sed rate because of his memory loss and dizziness  Patient advised to take a multivitamin and vitamin D on a regular basis, and continue his other medications as ordered  He is encouraged to exercise regularly and remain mentally and physically active  Patient condition discussed with patient and his daughter in detail today  Despite the possible slight progression of memory loss, they are not anxious for more cognitive enhancing agents yet  35 minutes spent with patient reviewing his records, discussing his case with his family and patient in detail, discussing therapeutic options and medications available, and discussing prognosis and diagnosis and treatment  Followup in 6 months time or earlier as needed    Signed By: Junior Jean MD     May 11, 2018       This note will not be viewable in [address]

## 2018-05-11 NOTE — LETTER
5/11/2018 6:18 PM 
 
Patient:  Jericho Diaz YOB: 1931 Date of Visit: 5/11/2018 Dear No Recipients: Thank you for referring Mr. Jericho Diaz to me for evaluation/treatment. Below are the relevant portions of my assessment and plan of care. Consult Subjective:  
 
Jericho Diaz is a 80 y.o. right hand  male seen for evaluation at the request of Dr. Moy Art of new problem of worsening dizziness and spells of loss of balance worse when bending over and increasing pressure sensation in the bifrontal head regions and back of the head and memory loss. 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 He has not had any recent MRI scans of the brain or carotid Dopplers or MRA of the brain, so we will reorder all these, because his previous workup and evaluation was done by another neurologist.  He has mild vestibular vertigo seem to be more positional, and I suspect that this is the cause of his dizziness. I would need to rule out VBI, basal artery disease, or other structural lesions of the brain in view of the progressive pressure in his head and dizziness. Carotid Doppler studies also ordered. Patient's wife does not think his mental status has worsened and he seems to be stable on Aricept. He has chronic ataxia, that has not really changed either. He was last seen a year and a half ago and returns now for worsening symptoms. Patient is on Aricept and his wife says he is doing well on that medication and does not need any new medications. His memory loss does not seem to be progressing at this time. His dizziness is worse and still persistent and he needs a cane to ambulate outside the house. aD Maier  His carotid Doppler studies done in June 2015 showed no significant blockages and was normal for age. He has a history of mini strokes according to the wife, with these being found on an abnormal MRI in the past under the care of his previous neurologist. Patient has not had any clear focal weakness, sensory loss, gait problems, visual difficulty or strokelike symptoms. We checked a carotid Doppler, sedimentation rate to rule out temporal arteritis, and an EEG because of headaches and memory loss and they were okay. Patient is on Aricept 10 memory, and not anxious to increase his medications. Patient also seen for numbness and weakness in his legs. Patient had a history of Guillain-Day[place] syndrome in 1990 and then several years later with a recurrent spell. He has persistent numbness and tingling and burning pain in his feet, and slight difficulty with weakness and balance problems, and uses a cane for ambulation and has had no progression of symptoms since his last visit a year ago. Recommended he continue multivitamins and vitamin D on a regular basis. He had a history of TIA and stroke in the past. He had no other new neurological symptoms, no stroke like symptoms, no new numbness or weakness or visual problems or new gait problems, or problems with his bowel or bladder. He takes Neurontin 600 mg 3-4 times a day for his neuropathic pain. Complete review of systems and symptoms he has had no new medical problems, complications or illnesses. He has a past history of dementia, and Guillain-Dayton syndrome x2, TIA, anxiety and depression, GERD. Past Medical History:  
Diagnosis Date  Cancer ([facility] 2005  
 throat Cancer; laser surgery, no chemo, 30 radiation Tx, breast ca  Chronic kidney disease   
 stage IV:   Dr Emilio Poe ([phone])  Chronic obstructive pulmonary disease ([facility]  Dysphagia 8/11/2015  GERD (gastroesophageal reflux disease)  Guillain-Dayton [facility]) 1990  
 as of 8/3/15 pt has aching/weakness in legs: Dr Rachelle Aquino History of heart attack 1981 Dr Keren GAYTAN (hard of hearing) doesn't wear hearing aides  Hypercholesteremia  Hypertension  Personal history of colonic polyps 8/11/2015  Senile dementia Dr Gm Witt Past Surgical History:  
Procedure Laterality Date  BREAST SURGERY PROCEDURE UNLISTED    
 removed breast cancer  COLONOSCOPY N/A 11/10/2016 COLONOSCOPY performed by Mor Gregorio MD at [facility] ENDOSCOPY  COLONOSCOPY,DIAGNOSTIC  8/11/2015  COLONOSCOPY,DIAGNOSTIC  11/10/2016  COLONOSCOPY,REMV LESN,SNARE  8/11/2015  HX APPENDECTOMY  HX OTHER SURGICAL  2005  
 throat cancer removal  
 HX OTHER SURGICAL  2005  
 radiation treatments Family History Problem Relation Age of Onset  Heart Disease Mother  Other Father   
  blood poisoning  Heart Disease Sister  Cancer Sister   
  breast  
 Cancer Child   
  breast  
 High Cholesterol Child  Heart defect Child Social History Substance Use Topics  Smoking status: Former Smoker Packs/day: 1.50 Years: 50.00 Quit date: 7/1/2004  Smokeless tobacco: Never Used  Alcohol use No  
   
Current Outpatient Prescriptions Medication Sig Dispense Refill  loperamide (IMODIUM) 2 mg capsule  loperamide (IMMODIUM) 2 mg tablet Take 2 mg by mouth four (4) times daily as needed for Diarrhea.  ALPRAZolam (XANAX) 0.5 mg tablet Take 0.5 mg by mouth three (3) times daily as needed for Anxiety.  donepezil (ARICEPT) 10 mg tablet Take 1 Tab by mouth nightly. 90 Tab 3  clopidogrel (PLAVIX) 75 mg tab Take 1 Tab by mouth daily. 90 Tab 3  
 gabapentin (NEURONTIN) 600 mg tablet Take 1 Tab by mouth four (4) times daily. 360 Tab 3  
 atorvastatin (LIPITOR) 20 mg tablet Take  by mouth daily.  sertraline (ZOLOFT) 25 mg tablet Take 25 mg by mouth nightly.  omeprazole (PRILOSEC) 20 mg capsule Take 20 mg by mouth daily (after lunch).  furosemide (LASIX) 40 mg tablet Take  by mouth two (2) times a day.  potassium chloride SR (KLOR-CON 10) 10 mEq tablet Take 10 mEq by mouth two (2) times a day.  desloratadine (CLARINEX) 5 mg tablet Take 5 mg by mouth daily. Allergies Allergen Reactions  Valium [Diazepam] Other (comments) Review of Systems: A comprehensive review of systems was negative except for: Constitutional: positive for fatigue and malaise Gastrointestinal: positive for dyspepsia and reflux symptoms Musculoskeletal: positive for myalgias, arthralgias, stiff joints and muscle weakness Neurological: positive for memory problems, paresthesia, coordination problems, gait problems and weakness Behvioral/Psych: positive for dementia, anxiety and depression Vitals:  
 05/11/18 1138 BP: 140/80 Pulse: 75 Resp: 12 Temp: 98 °F (36.7 °C) SpO2: 98% Weight: 160 lb (72.6 kg) Height: 5' 7\" (1.702 m) Objective: I 
 
 
NEUROLOGICAL EXAM: 
 
Appearance: The patient is thinly developed and nourished, provides a fair history and is in no acute distress. Mental Status: Oriented to year and president, place and person not day of the month, or month. Patient cannot do serial sevens or spell the word world backwards. Mood and affect appropriate. Speech is fluent Cranial Nerves:   Intact visual fields. Fundi are benign. Pupils anisocoric after cataract surgery, but do react, EOM's full, no nystagmus, no ptosis. Facial sensation is normal. Corneal reflexes are not tested. Facial movement is symmetric. Hearing is abnormal bilaterally. Palate is midline with normal sternocleidomastoid and trapezius muscles are normal. Tongue is midline. Neck without meningismus or bruits Motor:  4/5 strength in upper and lower proximal and distal muscles. Normal bulk and tone. No fasciculations. Reflexes:   Deep tendon reflexes 1+/4 and symmetrical. 
No babinski or clonus present On Hallpike [place] maneuver the patient does develop positional vertigo Sensory:   Abnormal to touch, pinprick and DSS, and vibration and temperature decreased in both feet. Gait:  Abnormal gait that is slightly wide-base and unsteady, and when outside the home uses a cane outside . Tremor:   No tremor noted. Cerebellar:  Abnormal Romberg and tandem cerebellar signs present. Neurovascular:  Normal heart sounds and regular rhythm, peripheral pulses decreased in both feet, and no carotid bruits. Assessment:  
 
 
Plan:  
 
Patient with worsening dizziness and pressure sensation in his head, we will repeat MRI scan, MRA of the brain, and carotid Doppler studies to rule out treatable causes of his symptoms. 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 Patient will continue his Plavix for the strokes, his Aricept for memory loss, and his Neurontin one twice a day and 2 at night for his residual painful sensory symptoms from his Guillain-Barré Patient had unremarkable carotid Doppler exam because of the dizziness and balance, and gait and EEG and sed rate because of his memory loss and dizziness Patient advised to take a multivitamin and vitamin D on a regular basis, and continue his other medications as ordered He is encouraged to exercise regularly and remain mentally and physically active Patient condition discussed with patient and his daughter in detail today Despite the possible slight progression of memory loss, they are not anxious for more cognitive enhancing agents yet 35 minutes spent with patient reviewing his records, discussing his case with his family and patient in detail, discussing therapeutic options and medications available, and discussing prognosis and diagnosis and treatment Followup in 6 months time or earlier as needed Signed By: Farhat Alamguer MD   
 May 11, 2018 This note will not be viewable in [address] If you have questions, please do not hesitate to call me. I look forward to following Mr. Dax Sanchez along with you. Sincerely, Farhat Almaguer MD

## 2018-05-11 NOTE — PATIENT INSTRUCTIONS
Office Policies  o Phone calls/patient messages:  Please allow up to 24 hours for someone in the office to contact you about your call or message. Be mindful your provider may be out of the office or your message may require further review. We encourage you to use [employer] for your messages as this is a faster, more efficient way to communicate with our office  o Medication Refills:  Prescription medications require up to 48 business hours to process. We encourage you to use [employer] for your refills. For controlled medications: Please allow up to 72 business hours to process. Certain medications may require you to  a written prescription at our office. NO narcotic/controlled medications will be prescribed after 4pm Monday through Friday or on weekends  o Form/Paperwork Completion:  Please note there is a $25 fee for all paperwork completed by our providers. We ask that you allow 7-14 business days. Pre-payment is due prior to picking up/faxing the completed form. You may also download your forms to [employer] to have your doctor print off.  o Test Results: In order for a patient to obtain test results, an appointment must be made with the physician to review the results. Test results cannot be discussed over the phone.

## 2018-05-16 NOTE — PROGRESS NOTES
I called the wife, told her to cut his dose down to just 1 or 2 Neurontin a day, and offered to reduce his dose to 300 mg, she would rather just use what they have for now and I recommended they get it rechecked again in a week and she said that she would try

## 2018-06-08 NOTE — PROCEDURES
This study consisted of pulsed wave Doppler examination, Color-flow imaging, and Duplex imaging of both the right and left carotid systems, and both vertebral arteries.        Imaging of both right and left carotid systems showed mild mixed plaquing at the bifurcations and proximal and distal internal and external carotid arteries bilaterally, with stenosis in the range of 16-49% only and with no flow abnormalities identified.        Both vertebral arteries showed normal antegrade flow.

## 2018-07-12 PROBLEM — I49.5 SSS (SICK SINUS SYNDROME) (HCC): Status: ACTIVE | Noted: 2018-01-01

## 2018-07-12 NOTE — PROGRESS NOTES
Presents for dual chamber pacer placement per Dr Michael Lewis request    Pt seen and examined. Labs reviewed. Risks and benefits disc. Will proceed.

## 2018-07-12 NOTE — PROGRESS NOTES
TRANSFER - IN REPORT:    Verbal report received from Kj Rowell  being received from Cath Lab (unit) for ordered procedure      Report consisted of patients Situation, Background, Assessment and   Recommendations(SBAR). Information from the following report(s) SBAR, Kardex, Procedure Summary, Intake/Output, MAR and Recent Results was reviewed with the receiving nurse. Opportunity for questions and clarification was provided. Assessment completed upon patients arrival to unit and care assumed.

## 2018-07-12 NOTE — PROCEDURES
[facility]  PROCEDURE NOTE    Pat Cortes  MR#: [id number]  : 1931  ACCOUNT #: [de-identified]   DATE OF SERVICE: 2018    PROCEDURE:   1. Left subclavian venogram.  2.  Dual chamber permanent pacemaker placement. SURGEON:  Cain Macias MD    INDICATION:  Nonreversible symptomatic sick sinus syndrome and AV node disease. DESCRIPTION OF PROCEDURE:  The patient was brought to the cardiac catheterization lab in a fasting state and after informed consent was obtained. Electrocardiographic and hemodynamic monitoring were performed. Sedation was performed by the nurse under the constant supervision of the attending physician from [phone] until . Lidocaine 1% with epinephrine was used to anesthetize the left chest wall implant site. The pocket was formed in the usual fashion and axillary venous access was obtained using 2 micropuncture needles. Two 8-Belgian SafeSheath's were placed in the left subclavian vein. The right ventricular lead was subsequently advanced to the right ventricular apex under fluoroscopic guidance. After appropriate parameters were obtained, the lead was screwed in place in the usual fashion. This was a [employer] Tendril MRI, model [id number], 58 cm lead, serial number [id number]. Parameters for this lead included an R-wave of 7.6 millivolts, impedance of 984 ohms and pacing threshold of 2.0 volts at 0.4 milliseconds pulse width. There was an excellent injury current on initial placement of the lead. Following this, atrial lead was advanced and placed in a similar fashion in the area of the lateral right atrial wall. This was a [employer] Tendril MRI, model [id number], 52 cm lead, serial number [id number]. Parameters for this lead included fibrillatory waves up to 1.0 millivolts and impedance of 608 ohms. Capture testing was not performed as the patient was in atrial fibrillation.   The leads were subsequently anchored to the pocket floor using 2-0 silk sutures at each anchor sleeve. The pulse generator was then connected to leads and placed in the pocket after hemostasis was confirmed. This was a [address] [employer] MRI, model [id number] device, serial [id number]. Vigorous irrigation with antibiotic solution was then performed in the pacemaker pocket and the pocket was closed using 2 running 2-0 Vicryl layers with a more superficial layer with running 4-0 Vicryl in a subcuticular fashion. Final fluoroscopic check revealed adequate redundancy of the leads and absence of a pneumothorax. Final settings were in the DDD mode with the lower rate limit of 60 and upper rate limit of 130 beats per minute. PLAN:  The patient was to have a followup portable chest x-ray immediately postprocedure and a pacemaker check today after the procedure. The patient was also to receive IV Ancef post-procedure, and post-discharge followup was to be in approximately 10-14 days for routine wound and device check.       Catarina Nolan MD       [address] [place] [address] / [place]  D: 07/12/2018 16:54     T: 07/12/2018 18:54  JOB #: [id number]  CC: Gale Dove MD

## 2018-07-12 NOTE — IP AVS SNAPSHOT
[address] [facility] 
[phone] Patient: Evon Torres MRN: [id number] XBF:[birth year] About your hospitalization You were admitted on:  July 12, 2018 You last received care in the:  MR[facility] 2 INTRVNTNL CARDIO You were discharged on:  July 13, 2018 Why you were hospitalized Your primary diagnosis was:  Not on File Your diagnoses also included:  Sss (Sick Sinus Syndrome) ([facility]) Follow-up Information Follow up With Details Comments Contact Info Moustapha Ceron MD Schedule an appointment as soon as possible for a visit  [address] [facility] [address] 
[phone] Juju House NP On 7/27/2018 2;00pm Cardiology follow-up for wound and device check [address] [facility] 
[phone] Discharge Orders None A check jed indicates which time of day the medication should be taken. My Medications START taking these medications Instructions Each Dose to Equal  
 Morning Noon Evening Bedtime  
 cephALEXin 500 mg capsule Commonly known as:  Tommy Charles Your last dose was: Your next dose is: Take 1 Cap by mouth four (4) times daily for 3 days. 500 mg  
    
   
   
   
  
 metoprolol tartrate 50 mg tablet Commonly known as:  LOPRESSOR Your last dose was: Your next dose is: Take 1 Tab by mouth two (2) times a day. 50 mg CHANGE how you take these medications Instructions Each Dose to Equal  
 Morning Noon Evening Bedtime  
 gabapentin 600 mg tablet Commonly known as:  NEURONTIN What changed:  when to take this Your last dose was: Your next dose is: Take 1 Tab by mouth four (4) times daily. 600 mg CONTINUE taking these medications  Instructions Each Dose to Equal  
 Morning Noon Evening Bedtime  
 albuterol 90 mcg/actuation inhaler Commonly known as:  PROVENTIL HFA, VENTOLIN HFA, PROAIR HFA Your last dose was: Your next dose is: Take 2 Puffs by inhalation every six (6) hours as needed for Wheezing. 2 Puff  
    
   
   
   
  
 amiodarone 200 mg tablet Commonly known as:  CORDARONE Your last dose was: Your next dose is: Take 200 mg by mouth two (2) times a day. 200 mg  
    
   
   
   
  
 aspirin 81 mg chewable tablet Your last dose was: Your next dose is: Take 81 mg by mouth daily. 81 mg  
    
   
   
   
  
 atorvastatin 20 mg tablet Commonly known as:  LIPITOR Your last dose was: Your next dose is: Take  by mouth daily. budesonide 90 mcg/actuation Aepb inhaler Commonly known as:  Saúl Charlie Your last dose was: Your next dose is: Take 2 Puffs by inhalation. 2 Puff CLARINEX 5 mg tablet Generic drug:  desloratadine Your last dose was: Your next dose is: Take 5 mg by mouth daily. 5 mg  
    
   
   
   
  
 clopidogrel 75 mg Tab Commonly known as:  PLAVIX Your last dose was: Your next dose is: Take 1 Tab by mouth daily. 75 mg  
    
   
   
   
  
 donepezil 10 mg tablet Commonly known as:  ARICEPT Your last dose was: Your next dose is: Take 1 Tab by mouth nightly. 10 mg  
    
   
   
   
  
 ferrous sulfate 325 mg (65 mg iron) tablet Your last dose was: Your next dose is: Take  by mouth Daily (before breakfast). finasteride 5 mg tablet Commonly known as:  PROSCAR Your last dose was: Your next dose is: Take 5 mg by mouth daily. 5 mg LASIX 40 mg tablet Generic drug:  furosemide Your last dose was: Your next dose is: Take 20 mg by mouth two (2) times a day. 20 mg  
    
   
   
   
  
 loperamide 2 mg tablet Commonly known as:  IMMODIUM Your last dose was: Your next dose is: Take 2 mg by mouth four (4) times daily as needed for Diarrhea.  
 2 mg  
    
   
   
   
  
 omeprazole 20 mg capsule Commonly known as:  PRILOSEC Your last dose was: Your next dose is: Take 20 mg by mouth daily (after lunch). 20 mg  
    
   
   
   
  
 potassium chloride SR 10 mEq tablet Commonly known as:  KLOR-CON 10 Your last dose was: Your next dose is: Take 10 mEq by mouth two (2) times a day. 10 mEq  
    
   
   
   
  
 sertraline 25 mg tablet Commonly known as:  ZOLOFT Your last dose was: Your next dose is: Take 25 mg by mouth nightly. 25 mg  
    
   
   
   
  
  
STOP taking these medications   
 dilTIAZem 30 mg tablet Commonly known as:  CARDIZEM Where to Get Your Medications Information on where to get these meds will be given to you by the nurse or doctor. ! Ask your nurse or doctor about these medications  
  cephALEXin 500 mg capsule  
 metoprolol tartrate 50 mg tablet Discharge Instructions Cardiology Discharge Summary Patient ID: 
Ana Falcon [id number] 
42 y.o. 
6/2/1931 Admit Date: 7/12/2018 Discharge Date: 7/13/2018 Admitting Physician: Chi Aquino MD  
 
Discharge Physician: Chi Aquino MD 
 
 
 
Patient Instructions:  
 
 
 
Referenced discharge instructions provided by nursing for diet and activity. Follow-up with Dr Cristin Deng nurse 2 weeks for pacer check Signed: 
Chi Aquino MD 
7/13/2018 12:10 PM 
DISCHARGE INSTRUCTIONS FOR PATIENTS WITH PACEMAKERS 1.  Remember to call for an appointment in 2 weeks [phone] to check healing and implant programming. 2. Medic Alert Bracelets are available from your pharmacist to wear at all times if you choose to wear one. 3. Carry your ID card for pacemaker with you at all times. This card will be given to you in the hospital or mailed to you. 4. The pacemaker will bulge slightly under your skin. The bulge will decrease in size over the next few weeks. Please notify the doctor's office if you notice any of the following around your site: A.  A bruise that does not go away. B.  Soreness or yellow, green, or brown drainage from the site. C. Any swelling from the site. D. If you have a fever of 100 degrees or higher that lasts for a few days. INCISION CARE 1.  Leave Steri strips  over your site until it starts to fall off, usually in a few weeks. 2.  You may shower after 3 days as long as your incision isnt submerged or directly sprayed upon until well healed. 3.  For comfort, wear loose fitting clothing. 4.  Report any signs of infection, fever, pain, swelling, redness, oozing, or heat at site especially if these symptoms increase after the first 3 to 4 days. ACTIVITY PRECAUTIONS 1. Avoid rough contact with the implant site. 2. No driving for 14 days. 3. Avoid lifting your arm over your head, carrying anything on the affected side, or lifting over 10 pounds for 30 days. For the first 2 days only bend your arm at the elbow. 4. Any extreme activity such as golf, weight lifting or exercise biking should be restricted for 60 days. 5. Do not carry objects by holding them against your implant site. 6.  No shooting rifles or any type of gun with the affected shoulder permanently. SPECIAL PRECAUTIONS 1. You should avoid all strong magnetic fields, such as arc welding, large transformers, large motors. 2.  You may not have an MRI which uses a strong magnet to take pictures.  
3.  Treatments or surgery that requires diathermy or electrocautery should be discussed with your doctor before scheduled. 4. Avoid radio frequency transmitters, including radar. 5. Advise dentist or other medical personnel you see that you have a pacemaker. 6.  Cell phones and microwave oven use is okay. 7.  If you plan to move or take a trip to a new area, the doctor's office will give you a name of a doctor to contact for any problems. ANTIBIOTIC THERAPY During the first 8 weeks after your pacemaker insertion, you may need antibiotics before any dental work or certain tests or operations. Let the dentist or doctor who is caring for you know that you have had an implanted device. You will be given a prescription for a prophylactic antibiotic called cephalexin to take for a few days after your procedure. [employer] Announcement We are excited to announce that we are making your provider's discharge notes available to you in [employer]. You will see these notes when they are completed and signed by the physician that discharged you from your recent hospital stay. If you have any questions or concerns about any information you see in [employer], please call the Health Information Department where you were seen or reach out to your Primary Care Provider for more information about your plan of care. Introducing [facility]! Neena Vance introduces [employer] patient portal. Now you can access parts of your medical record, email your doctor's office, and request medication refills online. 1. In your internet browser, go to [email or web address] 2. Click on the First Time User? Click Here link in the Sign In box. You will see the New Member Sign Up page. 3. Enter your [employer] Access Code exactly as it appears below. You will not need to use this code after youve completed the sign-up process. If you do not sign up before the expiration date, you must request a new code.  
 
· [employer] Access Code: [email or web address] 
 Expires: 8/9/2018 11:14 AM 
 
4. Enter the last four digits of your Social Security Number (xxxx) and Date of Birth (mm/dd/yyyy) as indicated and click Submit. You will be taken to the next sign-up page. 5. Create a [employer] ID. This will be your CardMunch login ID and cannot be changed, so think of one that is secure and easy to remember. 6. Create a CardMunch password. You can change your password at any time. 7. Enter your Password Reset Question and Answer. This can be used at a later time if you forget your password. 8. Enter your e-mail address. You will receive e-mail notification when new information is available in [address] 9. Click Sign Up. You can now view and download portions of your medical record. 10. Click the Download Summary menu link to download a portable copy of your medical information. If you have questions, please visit the Frequently Asked Questions section of the CardMunch website. Remember, CardMunch is NOT to be used for urgent needs. For medical emergencies, dial 911. Now available from your iPhone and Android! Introducing Frantz Zhang As a [facility] patient, I wanted to make you aware of our electronic visit tool called Frantz Zhang. [facility] 24/7 allows you to connect within minutes with a medical provider 24 hours a day, seven days a week via a mobile device or tablet or logging into a secure website from your computer. You can access Frantz Zhang from anywhere in the [place]. A virtual visit might be right for you when you have a simple condition and feel like you just dont want to get out of bed, or cant get away from work for an appointment, when your regular [facility] provider is not available (evenings, weekends or holidays), or when youre out of town and need minor care.   Electronic visits cost only $49 and if the Frantz Zhang provider determines a prescription is needed to treat your condition, one can be electronically transmitted to a nearby pharmacy*. Please take a moment to enroll today if you have not already done so. The enrollment process is free and takes just a few minutes. To enroll, please download the [facility] 24/7 mary beth to your tablet or phone, or visit [email or web address] to enroll on your computer. And, as an [address] [place] [address] patient with a [employer] account, the results of your visits will be scanned into your electronic medical record and your primary care provider will be able to view the scanned results. We urge you to continue to see your regular [facility] provider for your ongoing medical care. And while your primary care provider may not be the one available when you seek a Year Up virtual visit, the peace of mind you get from getting a real diagnosis real time can be priceless. For more information on Year Up, view our Frequently Asked Questions (FAQs) at [email or web address]. Sincerely, 
 
Saul Martinez MD 
Chief Medical Officer [address] [place] [address] *:  certain medications cannot be prescribed via Year Up Providers Seen During Your Hospitalization Provider Specialty Primary office phone Zahra Summers MD Cardiology [phone] Your Primary Care Physician (PCP) Primary Care Physician Office Phone Office Fax Wade Perla [phone] You are allergic to the following Allergen Reactions Valium (Diazepam) Other (comments) Recent Documentation Height Weight BMI Smoking Status 1.676 m 66.2 kg 23.57 kg/m2 Former Smoker Emergency Contacts Name Discharge Info Relation Home Work Mobile [address] CAREGIVER [3] Child [2] [phone] Ana Honeycutt  Spouse [3] [phone] Patient Belongings The following personal items are in your possession at time of discharge: 
  Dental Appliances: With patient  Visual Aid: Glasses      Home Medications: None   Jewelry: None  Clothing: At bedside    Other Valuables: None Please provide this summary of care documentation to your next provider. Signatures-by signing, you are acknowledging that this After Visit Summary has been reviewed with you and you have received a copy. Patient Signature:  ____________________________________________________________ Date:  ____________________________________________________________  
  
[facility] Provider Signature:  ____________________________________________________________ Date:  ____________________________________________________________

## 2018-07-12 NOTE — PROGRESS NOTES
Pt arrived to unit s/p Pacemaker, drsg to left chest D&I, pt denies any pain. MEWS score 3 due to -120's, pt has MD LATRELL aware.

## 2018-07-12 NOTE — PROGRESS NOTES
07/12/18 1900   Vitals   Temp 98.7 °F (37.1 °C)   Temp Source Oral   Pulse (Heart Rate) (!) 117   Resp Rate 18   O2 Sat (%) 96 %   Level of Consciousness Alert   /66   MAP (Monitor) 81   BP 1 Location Right arm   BP 1 Method Automatic   BP Patient Position At rest   MEWS Score 3       Mews 3, pt is tachycardic, MD is aware, medications were given, no further orders, will continue to monitor.

## 2018-07-13 NOTE — DISCHARGE INSTRUCTIONS
Cardiology Discharge Summary     Patient ID:  Evon Torres  [id number]  66 y.o.  6/2/1931    Admit Date: 7/12/2018    Discharge Date: 7/13/2018     Admitting Physician: Vicente Du MD     Discharge Physician: Vicente Du MD        Patient Instructions:         Referenced discharge instructions provided by nursing for diet and activity. Follow-up with Dr Bradly Jain nurse 2 weeks for pacer check       Signed:  Vicente Du MD  7/13/2018  12:10 PM  DISCHARGE INSTRUCTIONS FOR PATIENTS WITH PACEMAKERS    1. Remember to call for an appointment in 2 weeks [phone] to check healing and implant programming. 2. Medic Alert Bracelets are available from your pharmacist to wear at all times if you choose to wear one. 3. Carry your ID card for pacemaker with you at all times. This card will be given to you in the hospital or mailed to you. 4. The pacemaker will bulge slightly under your skin. The bulge will decrease in size over the next few weeks. Please notify the doctor's office if you notice any of the following around your site:   A.  A bruise that does not go away. B.  Soreness or yellow, green, or brown drainage from the site. C. Any swelling from the site. D. If you have a fever of 100 degrees or higher that lasts for a few days. INCISION CARE       1.  Leave Steri strips  over your site until it starts to fall off, usually in a few weeks. 2.  You may shower after 3 days as long as your incision isnt submerged or directly sprayed upon until well healed. 3.  For comfort, wear loose fitting clothing. 4.  Report any signs of infection, fever, pain, swelling, redness, oozing, or heat at site especially if these symptoms increase after the first 3 to 4 days. ACTIVITY PRECAUTIONS     1. Avoid rough contact with the implant site. 2. No driving for 14 days. 3. Avoid lifting your arm over your head, carrying anything on the affected side, or lifting over 10 pounds for 30 days.   For the first 2 days only bend your arm at the elbow. 4. Any extreme activity such as golf, weight lifting or exercise biking should be restricted for 60 days. 5. Do not carry objects by holding them against your implant site. 6.  No shooting rifles or any type of gun with the affected shoulder permanently. SPECIAL PRECAUTIONS     1. You should avoid all strong magnetic fields, such as arc welding, large transformers, large motors. 2.  You may not have an MRI which uses a strong magnet to take pictures. 3.  Treatments or surgery that requires diathermy or electrocautery should be discussed with your doctor before scheduled. 4. Avoid radio frequency transmitters, including radar. 5. Advise dentist or other medical personnel you see that you have a pacemaker. 6.  Cell phones and microwave oven use is okay. 7.  If you plan to move or take a trip to a new area, the doctor's office will give you a name of a doctor to contact for any problems. ANTIBIOTIC THERAPY    During the first 8 weeks after your pacemaker insertion, you may need antibiotics before any dental work or certain tests or operations. Let the dentist or doctor who is caring for you know that you have had an implanted device. You will be given a prescription for a prophylactic antibiotic called cephalexin to take for a few days after your procedure.

## 2018-07-13 NOTE — PROGRESS NOTES
Bedside report received from Brianna Henao RN                 Assessment, Background, Procedure summary, Intake/Output, MAR, and recent results discussed. Care assumed. Pt in chair. No complaints of pain, shortness of breath, dizziness, or light-headedness. Incision appears clean, dry, and intact. No swelling or hematoma present. 1157 MD aware of rapid heart rate. Notified Dr. Fran Vera of MEWS of 5. Patient asymptomatic. No further action required. Will continue to monitor. Pt ambulated in hallway. Pt appears steady and has no complaints of pain, shortness of breath, dizziness, or light-headedness. Incision appears clean, dry, and intact with no swelling or hematoma present.

## 2018-07-13 NOTE — PROGRESS NOTES
1930 - Bedside report from [facility]. HR -120's, up to 130, MD aware per report. Pt denies pain or complaints, afebrile. L upper chest is benign. Sling and ice pack in use.    2200 - some A flutter noted in different tele leads. Hx of afib and tachy-wander noted in hard copy of H&P on chart. Up to chair, no complaints. Lungs coarse, some mild wheezing. Neb tx requested when asked. sats 88-93 on RA. Will monitor. Chest is benign. 2300 - ambulation in room. HR still ST vs aflutter 120-130.      0400 - HR has gradually decreased to 100-110's while sleeping. No complaints. Bedside report to RN.

## 2018-07-13 NOTE — DISCHARGE SUMMARY
Stable post pacer. CXR ok. Pacer check ok. Will resume metoprolol, stop dilatiazem. D/C home and see me in 2 weeks for pacer check.

## 2018-07-13 NOTE — PROGRESS NOTES
ADULT PROTOCOL: JET AEROSOL ASSESSMENT    Patient  Jessica Borjas     80 y.o.   male     7/13/2018  9:06 AM    Breath Sounds Pre Procedure: Right Breath Sounds: Coarse                               Left Breath Sounds: Coarse    Breath Sounds Post Procedure: Right Breath Sounds: Coarse                                 Left Breath Sounds: Coarse    Breathing pattern: Pre procedure Breathing Pattern: Regular          Post procedure Breathing Pattern: Regular    Heart Rate: Pre procedure Pulse: 118           Post procedure Pulse: 133    Resp Rate: Pre procedure Respirations: 22           Post procedure Respirations: 20        Cough: Pre procedure Cough: Congested, Non-productive               Post procedure Cough: Non-productive      Oxygen: Room Air      Changed: NO    SpO2: Pre procedure SpO2: 90 %                 Post procedure SpO2: 93 %      Nebulizer Therapy: Current medications Aerosolized Medications: DuoNeb      Changed: NO        Problem List:   Patient Active Problem List   Diagnosis Code    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

## 2018-07-27 PROBLEM — R06.02 SOB (SHORTNESS OF BREATH) ON EXERTION: Status: ACTIVE | Noted: 2018-01-01

## 2018-07-27 NOTE — PROGRESS NOTES
Pharmacy  Enoxaparin (Lovenox®) Monitoring/Dosing Indication: DVT prophylaxis Current Dose: Enoxaparin 30 mg subcutaneously every 24 hours Creatinine Clearance (mL/min): 13 ml/min Labs: 
Recent Labs  
   07/27/18 
 1605 CREA  3.60* HGB  9.2*  
PLT  305 INR  1.2* Wt Readings from Last 1 Encounters:  
07/27/18 66.2 kg (145 lb 15.1 oz) Ht Readings from Last 1 Encounters:  
07/27/18 167.6 cm (66\") Impression/Plan: · Will change to heparin 5000 units subcutaneously q8hr for CrCl <20 per enoxaparin dose adjustment protocol. Thanks, Sekou Badillo, PHARMD

## 2018-07-27 NOTE — ED PROVIDER NOTES
EMERGENCY DEPARTMENT HISTORY AND PHYSICAL EXAM 
 
 
Date: 7/27/2018 Patient Name: Destin Martinez History of Presenting Illness Chief Complaint Patient presents with  Shortness of Breath Patient to triage via wheelchair and complain of shortness of breath and was advised by Cardiologist to rule out a DVT History Provided By: Patient and Patient's Daughter HPI: Destin Martinez, 80 y.o. male with PMHx significant for Guillain-barre, senile dementia, HTN, hypercholesteremia, breast CA, presents vis wheelchair to the ED with cc of intermittent moderate SOB, ongoing for several weeks. Pt reports LLQ pain with subjective fevers. His rectal temperature in ED is 100.2 F in ED. Per daughter, pt had a pacemaker placed 2 weeks ago and was in cardiologist's office today for a checkup when he became short of breath. Additionally, pulse oxygenation measurement was low in providers office and he was advised to visit the ED to check for possible blood clot. He denies being on any home O2. Pt endorses to being on Plavix. Of note, pt had a cough 2 weeks ago, prior to pacemaker placement. Pt specifically denies any HA, nausea, vomiting, fevers, chills, abdominal pain, or cough. Chief Complaint: SOB Duration: 2 Weeks Timing:  Intermittent Location: N/A Quality: N/A Severity: Moderate Modifying Factors: denies any modifying factors Associated Symptoms: LLQ pain, fever There are no other complaints, changes, or physical findings at this time. PCP: Juvencio Kwan MD 
 
Current Facility-Administered Medications Medication Dose Route Frequency Provider Last Rate Last Dose  [START ON 7/28/2018] piperacillin-tazobactam (ZOSYN) 3.375 g in 0.9% sodium chloride (MBP/ADV) 100 mL  3.375 g IntraVENous Q8H Jillian Marmolejo MD      
[employer] [START ON 7/29/2018] levoFLOXacin (LEVAQUIN) 500 mg in D5W IVPB  500 mg IntraVENous Q48H Jillian Marmolejo MD      
 albuterol (PROVENTIL HFA, VENTOLIN HFA, PROAIR HFA) inhaler 2 Puff  2 Puff Inhalation Q6H PRN Job Armenta MD      
 amiodarone (CORDARONE) tablet 200 mg  200 mg Oral BID Job Armenta MD   200 mg at 07/27/18 2021  fluticasone furoate (ARNUITY ELLIPTA) 100 mcg/puff  1 Puff Inhalation DAILY Job Armenta MD      
 [START ON 7/28/2018] finasteride (PROSCAR) tablet 5 mg  5 mg Oral DAILY Job Armenta MD      
 sertraline (ZOLOFT) tablet 25 mg  25 mg Oral QHS Job Armenta MD      
 metoprolol tartrate (LOPRESSOR) tablet 50 mg  50 mg Oral BID Job Armenta MD   50 mg at 07/27/18 2021  
 donepezil (ARICEPT) tablet 10 mg  10 mg Oral QHS Job Armenta MD      
 [START ON 7/28/2018] ferrous sulfate tablet 325 mg  1 Tab Oral DAILY WITH BREAKFAST Job Armenta MD      
 [START ON 7/28/2018] clopidogrel (PLAVIX) tablet 75 mg  75 mg Oral DAILY Job Armenta MD      
 [START ON 7/28/2018] aspirin chewable tablet 81 mg  81 mg Oral DAILY Job Armenta MD      
 [START ON 7/28/2018] atorvastatin (LIPITOR) tablet 20 mg  20 mg Oral DAILY Job Armenta MD      
 sodium chloride (NS) flush 5-10 mL  5-10 mL IntraVENous Q8H Job Armenta MD      
 sodium chloride (NS) flush 5-10 mL  5-10 mL IntraVENous PRN Job Armenta MD      
 acetaminophen (TYLENOL) tablet 650 mg  650 mg Oral Q4H PRN Job Armenta MD      
 ondansetron [facility]) injection 4 mg  4 mg IntraVENous Q4H PRN Job Armenta MD      
 heparin (porcine) injection 5,000 Units  5,000 Units SubCUTAneous Q8H Jillian Marmolejo MD   5,000 Units at 07/27/18 2021 Past History Past Medical History: 
Past Medical History:  
Diagnosis Date  Cancer ([facility] [address] 2005  
 throat Cancer; laser surgery, no chemo, 30 radiation Tx, breast ca  Chronic kidney disease   
 stage IV:   Dr Simeon Cee ([phone])  Chronic obstructive pulmonary disease ([facility] [address]  Dysphagia 8/11/2015  GERD (gastroesophageal reflux disease)  Guillain-[facility]) 1990  
 as of 8/3/15 pt has aching/weakness in legs: Dr Kaylin Brambila  History of heart attack 1981 Dr Lloyd GAYTAN (hard of hearing) doesn't wear hearing aides  Hypercholesteremia  Hypertension  Personal history of colonic polyps 8/11/2015  Senile dementia Dr Juan Clark Past Surgical History: 
Past Surgical History:  
Procedure Laterality Date  BREAST SURGERY PROCEDURE UNLISTED    
 removed breast cancer  COLONOSCOPY N/A 11/10/2016 COLONOSCOPY performed by Lucy Marroquin MD at [facility] ENDOSCOPY  COLONOSCOPY,DIAGNOSTIC  8/11/2015  COLONOSCOPY,DIAGNOSTIC  11/10/2016  COLONOSCOPY,REMV LESN,SNARE  8/11/2015  HX APPENDECTOMY  HX OTHER SURGICAL  2005  
 throat cancer removal  
 HX OTHER SURGICAL  2005  
 radiation treatments Family History: 
Family History Problem Relation Age of Onset  Heart Disease Mother  Other Father   
  blood poisoning  Heart Disease Sister  Cancer Sister   
  breast  
 Cancer Child   
  breast  
 High Cholesterol Child  Heart defect Child Social History: 
Social History Substance Use Topics  Smoking status: Former Smoker Packs/day: 1.50 Years: 50.00 Quit date: 7/1/2004  Smokeless tobacco: Never Used  Alcohol use No  
 
 
Allergies: Allergies Allergen Reactions  Valium [Diazepam] Other (comments) Review of Systems Review of Systems Constitutional: Positive for fever. Negative for activity change, appetite change, chills and unexpected weight change. HENT: Negative for congestion. Eyes: Negative for pain and visual disturbance. Respiratory: Positive for shortness of breath. Negative for cough. Cardiovascular: Negative for chest pain. Gastrointestinal: Positive for abdominal pain (LLQ). Negative for diarrhea, nausea and vomiting. Genitourinary: Negative for dysuria. Musculoskeletal: Negative for back pain. Skin: Negative for rash. Neurological: Negative for headaches.   
 
Physical Exam  
Physical Exam  
Constitutional: He is oriented to person, place, and time. He appears well-developed and well-nourished. This is an elderly male in moderate distress due to pain. HENT:  
Head: Normocephalic and atraumatic. Mouth/Throat: Oropharynx is clear and moist.  
Eyes: Conjunctivae and EOM are normal. Pupils are equal, round, and reactive to light. Right eye exhibits no discharge. Left eye exhibits no discharge. Neck: Normal range of motion. Neck supple. No JVD present. Cardiovascular: Regular rhythm and normal heart sounds. Tachycardia present. No murmur heard. Pulmonary/Chest: Tachypnea noted. He is in respiratory distress. He has wheezes. He has rhonchi (faint in BL bases). He has rales. He exhibits no tenderness. Fresh left pacer site appears well healing, C/D/I. Abdominal: Soft. Bowel sounds are normal. He exhibits no distension. There is no tenderness. Musculoskeletal: Normal range of motion. He exhibits no edema. Neurological: He is alert and oriented to person, place, and time. No cranial nerve deficit. He exhibits normal muscle tone. Skin: Skin is warm and dry. No rash noted. He is not diaphoretic. Nursing note and vitals reviewed. Diagnostic Study Results Labs - Recent Results (from the past 12 hour(s)) EKG, 12 LEAD, INITIAL Collection Time: 07/27/18  3:34 PM  
Result Value Ref Range Ventricular Rate 104 BPM  
 Atrial Rate 104 BPM  
 P-R Interval 152 ms QRS Duration 90 ms Q-T Interval 356 ms  
 QTC Calculation (Bezet) 468 ms Calculated P Axis -8 degrees Calculated R Axis -18 degrees Calculated T Axis 23 degrees Diagnosis Sinus tachycardia When compared with ECG of 13-JUL-2018 04:11, Sinus rhythm has replaced Atrial flutter ST no longer elevated in Inferior leads CBC WITH AUTOMATED DIFF Collection Time: 07/27/18  4:05 PM  
Result Value Ref Range WBC 9.7 4.1 - 11.1 K/uL  
 RBC 3.97 (L) 4.10 - 5.70 M/uL  HGB 9.2 (L) 12.1 - 17.0 g/dL  
 HCT 32.1 (L) 36.6 - 50.3 % MCV 80.9 80.0 - 99.0 FL  
 MCH 23.2 (L) 26.0 - 34.0 PG  
 MCHC 28.7 (L) 30.0 - 36.5 g/dL RDW 29.0 (H) 11.5 - 14.5 % PLATELET 872 252 - 584 K/uL MPV 9.5 8.9 - 12.9 FL  
 NRBC 0.0 0  WBC ABSOLUTE NRBC 0.00 0.00 - 0.01 K/uL NEUTROPHILS 76 (H) 32 - 75 % LYMPHOCYTES 13 12 - 49 % MONOCYTES 9 5 - 13 % EOSINOPHILS 1 0 - 7 % BASOPHILS 1 0 - 1 % IMMATURE GRANULOCYTES 0 0.0 - 0.5 % ABS. NEUTROPHILS 7.3 1.8 - 8.0 K/UL  
 ABS. LYMPHOCYTES 1.3 0.8 - 3.5 K/UL  
 ABS. MONOCYTES 0.9 0.0 - 1.0 K/UL  
 ABS. EOSINOPHILS 0.1 0.0 - 0.4 K/UL  
 ABS. BASOPHILS 0.1 0.0 - 0.1 K/UL  
 ABS. IMM. GRANS. 0.0 0.00 - 0.04 K/UL  
 DF AUTOMATED PLATELET COMMENTS ACANTHOCYTES    
 RBC COMMENTS ANISOCYTOSIS 3+ 
    
 RBC COMMENTS POIKILOCYTOSIS 
PRESENT 
    
 RBC COMMENTS HYPOCHROMIA 1+ 
    
 RBC COMMENTS MICROCYTOSIS 
PRESENT 
    
 RBC COMMENTS OVALOCYTES PRESENT 
    
 RBC COMMENTS POLYCHROMASIA PRESENT 
    
PROTHROMBIN TIME + INR Collection Time: 07/27/18  4:05 PM  
Result Value Ref Range INR 1.2 (H) 0.9 - 1.1 Prothrombin time 12.0 (H) 9.0 - 11.1 sec D DIMER Collection Time: 07/27/18  4:05 PM  
Result Value Ref Range D-dimer 5.69 (H) 0.00 - 0.65 mg/L FEU METABOLIC PANEL, BASIC Collection Time: 07/27/18  4:05 PM  
Result Value Ref Range Sodium 143 136 - 145 mmol/L Potassium 5.0 3.5 - 5.1 mmol/L Chloride 114 (H) 97 - 108 mmol/L  
 CO2 22 21 - 32 mmol/L Anion gap 7 5 - 15 mmol/L Glucose 107 (H) 65 - 100 mg/dL BUN 41 (H) 6 - 20 MG/DL Creatinine 3.60 (H) 0.70 - 1.30 MG/DL  
 BUN/Creatinine ratio 11 (L) 12 - 20 GFR est AA 20 (L) >60 ml/min/1.73m2 GFR est non-AA 16 (L) >60 ml/min/1.73m2 Calcium 8.8 8.5 - 10.1 MG/DL  
LACTIC ACID Collection Time: 07/27/18  4:05 PM  
Result Value Ref Range Lactic acid 1.1 0.4 - 2.0 MMOL/L  
OCCULT BLOOD, STOOL  Collection Time: 07/27/18  5:45 PM  
Result Value Ref Range Occult blood, stool NEGATIVE  NEG Radiologic Studies -  
XR CHEST PORT Final Result NM LUNG VENT/PERF    (Results Pending) CXR Results  (Last 48 hours) 07/27/18 1716  XR CHEST PORT Final result Impression:  Impression: 1. Enlarged cardiac silhouette, otherwise no interval change Narrative:  INDICATION:  r/o pna, effusion EXAM: Single portable view of chest 1655 . Comparison: July 12, 2018 Findings: Cardiac silhouette is enlarged. Pulmonary vasculature is not engorged. Diffuse interstitial markings throughout the lungs unchanged. No new  
consolidation effusion or pneumothorax. Pacer unchanged Medical Decision Making I am the first provider for this patient. I reviewed the vital signs, available nursing notes, past medical history, past surgical history, family history and social history. Vital Signs-Reviewed the patient's vital signs. Patient Vitals for the past 12 hrs: 
 Temp Pulse Resp BP SpO2  
07/27/18 2238 98.7 °F (37.1 °C) 98 20 129/81 100 % 07/27/18 2143 98.5 °F (36.9 °C) 97 20 121/81 95 %  
07/27/18 2000 - (!) 104 21 133/83 93 % 07/27/18 1945 - (!) 105 19 (!) 130/95 94 %  
07/27/18 1930 - 99 21 (!) 133/95 (!) 87 %  
07/27/18 1915 - 98 25 134/87 90 % 07/27/18 1900 - 100 18 131/86 95 %  
07/27/18 1845 - (!) 102 21 130/83 95 %  
07/27/18 1655 100.2 °F (37.9 °C) (!) 104 22 - 95 %  
07/27/18 1600 - (!) 105 26 128/83 95 %  
07/27/18 1549 - - - - 94 %  
07/27/18 1540 - (!) 105 19 137/89 96 %  
07/27/18 1521 98.5 °F (36.9 °C) (!) 107 16 112/79 90 % Pulse Oximetry Analysis - 94% on NC Cardiac Monitor:  
Rate: 104 bpm bpm 
Rhythm: Sinus Tachycardia EKG interpretation: (Preliminary) 15:34 Rhythm: sinus tachycardia; and irregular.  Rate (approx.): 104 bpm; Axis: LAD; GA interval: 152 ms; QRS interval: 90 ms; ST/T wave: normal. 
Written by WANG Bob, as dictated by Sohail Bridges, MD. 
 
Records Reviewed: Nursing Notes, Old Medical Records and Previous Laboratory Studies Provider Notes (Medical Decision Making):  
Elderly male presenting in respiratory distress after recent pacer placement. Peripheral exam without obvious fluid overload. Given level of of hypoxemia, will send for imaging. ED Course:  
Initial assessment performed. The patients presenting problems have been discussed, and they are in agreement with the care plan formulated and outlined with them. I have encouraged them to ask questions as they arise throughout their visit. 17:30 Creatinine notably elevated, will send patient for VQ. Given delay in diagnosis, will initiate HAP antibiotics and discuss with IM for admission. 5:53 PM 
RN reports that she noticed probe was missing after taking rectal temperature and was unsure if it was still in pt's rectum or not placed initially. Discussed mistake with patient and his daughter. Exam completed. Procedure Note - Rectal Exam:  
17:53 PM 
Performed by: Sammy Trevizo MD 
Chaperoned by: Vee Spencer RN Rectal exam performed. dark stool was collected. Stool was collected and sent to the lab for Hemoccult testing. Other findings: no probe was found The procedure took 1-15 minutes, and pt tolerated well. CONSULT NOTE:  
5:47 PM 
Sammy Trevizo MD spoke with Shania Andrade MD. Specialty: Hospitalist 
Discussed pt's hx, disposition, and available diagnostic and imaging results. Reviewed care plans. Consultant will evaluate pt for admission. Written by Rory Jimenez, ED Scribe, as dictated by Sammy Trevizo MD. Critical Care Time: 0 Disposition: 
Admit Note: 
5:47 PM 
Pt is being admitted by Mannie Walsh. The results of their tests and reason(s) for their admission have been discussed with pt and/or available family. They convey agreement and understanding for the need to be admitted and for admission diagnosis. PLAN: 
1.  Admit to  [facility] 
Diagnosis Clinical Impression: 1. Hypoxia Attestations: This note is prepared by Yoli Robin, acting as a Scribe for Shwetha Lund MD. Shwetha Lund MD: The scribe's documentation has been prepared under my direction and personally reviewed by me in its entirety. I confirm that the notes above accurately reflects all work, treatment, procedures, and medical decision making performed by me.

## 2018-07-27 NOTE — H&P
Hospitalist Admission Note NAME: Destin Martinez :  1931 MRN:  [id number] Date/Time:  2018 7:15 PM 
 
Patient PCP: Juvencio Kwan MD 
_____________________________________________________________________ Given the patient's current clinical presentation, I have a high level of concern for decompensation if discharged from the emergency department. Complex decision making was performed, which includes reviewing the patient's available past medical records, laboratory results, and x-ray films. My assessment of this patient's clinical condition and my plan of care is as follows. Assessment / Plan: SOB 
CHF vs PNA  
S/P PPM 
-CXR Diffuse interstitial markings throughout the lungs  
-VQ scan very low probability  
-continue empiric IV antibiotics but no fever or leukocytosis and cough not productive.   
-check proBNP and Echocardiogram.   Will start IV lasix daily 
-consult her cardiologist in AM 
-continue asa / plavix, lopressor and ammiodarone COPD 
-not in acute exacerbation. Continue CKD4 Anemia, likely from renal failure 
-follow Cr. Heme - in ER Hx TIA -on plavix Hx throat cancer Dementia  
-continue aricept Depression / anxiety 
-continue zoloft Code Status:  
Surrogate Decision Maker: DVT Prophylaxis:  
GI Prophylaxis: not indicated Baseline:  
  
  
 
Subjective: CHIEF COMPLAINT:  Sent to the ER by his cardiologist 
 
HISTORY OF PRESENT ILLNESS:    
Destin Martinez is a 80 y.o.  male with a history of HTN, HDL, recent PPM, COPD, CKD and dementia who was referred to the ER for SOB and to rule out PE. Patient had a PPM inserted 2 weeks ago. He presented to his cardiologist office today for follow up and reported his SOB. He denies orthopnea, PND, worsening edema but his dyspnea is more with activities. ER evaluation included CXR showing diffuse interstitial disease. VQ scan pending.   He was started on antibiotics and we were asked to admit for work up and evaluation of the above problems. Past Medical History:  
Diagnosis Date  Cancer ([facility] [address] [facility] 2005  
 throat Cancer; laser surgery, no chemo, 30 radiation Tx, breast ca  Chronic kidney disease   
 stage IV:   Dr Mell Toro ([phone])  [facility]  Dysphagia 8/11/2015  GERD (gastroesophageal reflux disease)  [facility]) 1990  
 as of 8/3/15 pt has aching/weakness in legs: Dr Macias Carrier History of heart attack 1981 Dr Beatrice GAYTAN (hard of hearing) doesn't wear hearing aides  Hypercholesteremia  Hypertension  Personal history of colonic polyps 8/11/2015  Senile dementia Dr Nolan Zeng Past Surgical History:  
Procedure Laterality Date  BREAST SURGERY PROCEDURE UNLISTED    
 removed breast cancer  COLONOSCOPY N/A 11/10/2016 COLONOSCOPY performed by Benton Kan MD at [facility] ENDOSCOPY  COLONOSCOPY,DIAGNOSTIC  8/11/2015  COLONOSCOPY,DIAGNOSTIC  11/10/2016  COLONOSCOPY,YAIR HUNTER,SNARE  8/11/2015  HX APPENDECTOMY  HX OTHER SURGICAL  2005  
 throat cancer removal  
 HX OTHER SURGICAL  2005  
 radiation treatments Social History Substance Use Topics  Smoking status: Former Smoker Packs/day: 1.50 Years: 50.00 Quit date: 7/1/2004  Smokeless tobacco: Never Used  Alcohol use No  
  
 
Family History Problem Relation Age of Onset  Heart Disease Mother  Other Father   
  blood poisoning  Heart Disease Sister  Cancer Sister   
  breast  
 Cancer Child   
  breast  
 High Cholesterol Child  Heart defect Child Allergies Allergen Reactions  Valium [Diazepam] Other (comments) Prior to Admission medications Medication Sig Start Date End Date Taking? Authorizing Provider  
metoprolol tartrate (LOPRESSOR) 50 mg tablet Take 1 Tab by mouth two (2) times a day. 7/13/18   Clif Thomas MD  
amiodarone (CORDARONE) 200 mg tablet Take 200 mg by mouth two (2) times a day. Historical Provider  
finasteride (PROSCAR) 5 mg tablet Take 5 mg by mouth daily. Historical Provider  
aspirin 81 mg chewable tablet Take 81 mg by mouth daily. Historical Provider  
budesonide (PULMICORT FLEXHALER) 90 mcg/actuation aepb inhaler Take 2 Puffs by inhalation. Historical Provider  
albuterol (PROVENTIL HFA, VENTOLIN HFA, PROAIR HFA) 90 mcg/actuation inhaler Take 2 Puffs by inhalation every six (6) hours as needed for Wheezing. Historical Provider  
ferrous sulfate 325 mg (65 mg iron) tablet Take  by mouth Daily (before breakfast). Historical Provider  
gabapentin (NEURONTIN) 600 mg tablet Take 1 Tab by mouth four (4) times daily. Patient taking differently: Take 600 mg by mouth three (3) times daily. 5/11/18   Adeel Rangel MD  
loperamide (IMMODIUM) 2 mg tablet Take 2 mg by mouth four (4) times daily as needed for Diarrhea. Historical Provider  
donepezil (ARICEPT) 10 mg tablet Take 1 Tab by mouth nightly. 5/11/17   Adeel Rangel MD  
clopidogrel (PLAVIX) 75 mg tab Take 1 Tab by mouth daily. 5/11/17   Adeel Rangel MD  
atorvastatin (LIPITOR) 20 mg tablet Take  by mouth daily. Historical Provider  
sertraline (ZOLOFT) 25 mg tablet Take 25 mg by mouth nightly. 2/20/14   Historical Provider  
omeprazole (PRILOSEC) 20 mg capsule Take 20 mg by mouth daily (after lunch). Historical Provider  
furosemide (LASIX) 40 mg tablet Take 20 mg by mouth two (2) times a day. Historical Provider  
potassium chloride SR (KLOR-CON 10) 10 mEq tablet Take 10 mEq by mouth two (2) times a day. Historical Provider  
desloratadine (CLARINEX) 5 mg tablet Take 5 mg by mouth daily. Historical Provider REVIEW OF SYSTEMS:    
 
Total of 12 systems reviewed as follows:   
   POSITIVE= underlined text  Negative = text not underlined General:  fever, chills, sweats, generalized weakness, weight loss/gain,  
   loss of appetite Eyes:    blurred vision, eye pain, loss of vision, double vision ENT:    rhinorrhea, pharyngitis Respiratory:   cough, sputum production, SOB, REYEZ, wheezing, pleuritic pain  
Cardiology:   chest pain, palpitations, orthopnea, PND, edema, syncope Gastrointestinal:  abdominal pain , N/V, diarrhea, dysphagia, constipation, bleeding Genitourinary:  frequency, urgency, dysuria, hematuria, incontinence Muskuloskeletal :  arthralgia, myalgia, back pain Hematology:  easy bruising, nose or gum bleeding, lymphadenopathy Dermatological: rash, ulceration, pruritis, color change / jaundice Endocrine:   hot flashes or polydipsia Neurological:  headache, dizziness, confusion, focal weakness, paresthesia, Speech difficulties, memory loss, gait difficulty Psychological: Feelings of anxiety, depression, agitation Objective: VITALS:   
Visit Vitals  /83 (BP 1 Location: Left arm, BP Patient Position: At rest)  Pulse (!) 104  Temp 100.2 °F (37.9 °C)  Resp 22  
 Ht 5' 6\" (1.676 m)  Wt 66.2 kg (145 lb 15.1 oz)  SpO2 95%  BMI 23.56 kg/m2 PHYSICAL EXAM: 
 
General:    Alert, cooperative, no distress, appears stated age. HEENT: Atraumatic, anicteric sclerae, pink conjunctivae No oral ulcers, mucosa moist, throat clear, dentition fair Neck:  Supple, symmetrical,  thyroid: non tender Lungs:   Basal rales. No wheezing Chest wall:  No tenderness  No Accessory muscle use. Heart:   Regular  rhythm,  No  murmur   trace edema Abdomen:   Soft, non-tender. Not distended. Bowel sounds normal 
Extremities: No cyanosis. No clubbing,  Skin turgor normal, Capillary refill normal, Radial dial pulse 2+ Skin:     Not pale. Not Jaundiced  No rashes Psych:  Good insight. Not depressed. Not anxious or agitated. Neurologic: EOMs intact. No facial asymmetry. No aphasia or slurred speech.  Symmetrical strength, Sensation grossly intact. Alert and oriented X 4.  
 
_______________________________________________________________________ Care Plan discussed with: 
  Comments Patient x Family RN Care Manager Consultant:     
_______________________________________________________________________ Expected  Disposition:  
Home with Family x HH/PT/OT/RN   
SNF/LTC   
VINCENT   
________________________________________________________________________ TOTAL TIME:  45  Minutes Critical Care Provided     Minutes non procedure based Comments  
 x Reviewed previous records  
>50% of visit spent in counseling and coordination of care  Discussion with patient and/or family and questions answered Given the patient's current clinical presentation, I have a high level of concern for decompensation if discharged from the ED. Complex decision making was performed which includes reviewing the patient's available past medical records, laboratory results, and Xray films. I have also directly communicated my plan and discussed this case with the involved ED physician.  
 
____________________________________________________________________ Praveena Cheek MD 
 
Procedures: see electronic medical records for all procedures/Xrays and details which were not copied into this note but were reviewed prior to creation of Plan. LAB DATA REVIEWED:   
Recent Results (from the past 24 hour(s)) EKG, 12 LEAD, INITIAL Collection Time: 07/27/18  3:34 PM  
Result Value Ref Range Ventricular Rate 104 BPM  
 Atrial Rate 104 BPM  
 P-R Interval 152 ms QRS Duration 90 ms Q-T Interval 356 ms  
 QTC Calculation (Bezet) 468 ms Calculated P Axis -8 degrees Calculated R Axis -18 degrees Calculated T Axis 23 degrees Diagnosis Sinus tachycardia When compared with ECG of 13-JUL-2018 04:11, Sinus rhythm has replaced Atrial flutter ST no longer elevated in Inferior leads CBC WITH AUTOMATED DIFF  
 Collection Time: 07/27/18  4:05 PM  
Result Value Ref Range WBC 9.7 4.1 - 11.1 K/uL  
 RBC 3.97 (L) 4.10 - 5.70 M/uL HGB 9.2 (L) 12.1 - 17.0 g/dL HCT 32.1 (L) 36.6 - 50.3 % MCV 80.9 80.0 - 99.0 FL  
 MCH 23.2 (L) 26.0 - 34.0 PG  
 MCHC 28.7 (L) 30.0 - 36.5 g/dL RDW 29.0 (H) 11.5 - 14.5 % PLATELET 022 327 - 575 K/uL MPV 9.5 8.9 - 12.9 FL  
 NRBC 0.0 0  WBC ABSOLUTE NRBC 0.00 0.00 - 0.01 K/uL NEUTROPHILS 76 (H) 32 - 75 % LYMPHOCYTES 13 12 - 49 % MONOCYTES 9 5 - 13 % EOSINOPHILS 1 0 - 7 % BASOPHILS 1 0 - 1 % IMMATURE GRANULOCYTES 0 0.0 - 0.5 % ABS. NEUTROPHILS 7.3 1.8 - 8.0 K/UL  
 ABS. LYMPHOCYTES 1.3 0.8 - 3.5 K/UL  
 ABS. MONOCYTES 0.9 0.0 - 1.0 K/UL  
 ABS. EOSINOPHILS 0.1 0.0 - 0.4 K/UL  
 ABS. BASOPHILS 0.1 0.0 - 0.1 K/UL  
 ABS. IMM. GRANS. 0.0 0.00 - 0.04 K/UL  
 DF AUTOMATED PLATELET COMMENTS ACANTHOCYTES    
 RBC COMMENTS ANISOCYTOSIS 3+ 
    
 RBC COMMENTS POIKILOCYTOSIS 
PRESENT 
    
 RBC COMMENTS HYPOCHROMIA 1+ 
    
 RBC COMMENTS MICROCYTOSIS 
PRESENT 
    
 RBC COMMENTS OVALOCYTES PRESENT 
    
 RBC COMMENTS POLYCHROMASIA PRESENT 
    
PROTHROMBIN TIME + INR Collection Time: 07/27/18  4:05 PM  
Result Value Ref Range INR 1.2 (H) 0.9 - 1.1 Prothrombin time 12.0 (H) 9.0 - 11.1 sec D DIMER Collection Time: 07/27/18  4:05 PM  
Result Value Ref Range D-dimer 5.69 (H) 0.00 - 0.65 mg/L FEU METABOLIC PANEL, BASIC Collection Time: 07/27/18  4:05 PM  
Result Value Ref Range Sodium 143 136 - 145 mmol/L Potassium 5.0 3.5 - 5.1 mmol/L Chloride 114 (H) 97 - 108 mmol/L  
 CO2 22 21 - 32 mmol/L Anion gap 7 5 - 15 mmol/L Glucose 107 (H) 65 - 100 mg/dL BUN 41 (H) 6 - 20 MG/DL Creatinine 3.60 (H) 0.70 - 1.30 MG/DL  
 BUN/Creatinine ratio 11 (L) 12 - 20 GFR est AA 20 (L) >60 ml/min/1.73m2 GFR est non-AA 16 (L) >60 ml/min/1.73m2 Calcium 8.8 8.5 - 10.1 MG/DL  
LACTIC ACID  Collection Time: 07/27/18  4:05 PM Result Value Ref Range Lactic acid 1.1 0.4 - 2.0 MMOL/L  
OCCULT BLOOD, STOOL Collection Time: 07/27/18  5:45 PM  
Result Value Ref Range  Occult blood, stool NEGATIVE  NEG

## 2018-07-27 NOTE — ED NOTES
Verbal shift change report given to Ferdinand Thompson, RN (oncoming nurse) by this RN (offgoing nurse). Report included the following information SBAR.

## 2018-07-27 NOTE — PROGRESS NOTES
Pharmacy Automatic Renal Dosing Protocol - Antimicrobials Indication for Antimicrobials: HAP  
PMH:  Hx Breast Ca in male, Ca of throat, Chronic kidney disease IV Current Regimen of Each Antimicrobial: 
Levofloxacin 750mg IV q24h - started  day 1 Zosyn 3.375gm IV q8h - kioiv1m  day 1 Previous Antimicrobial Therapy: 
None Significant Cultures:  
 PBCx - pending Radiology / Imaging results: (X-ray, CT scan or MRI):  
 CXR - Enlarged cardiac silhouette, otherwise no interval change Paralysis, amputations, malnutrition: none Labs: 
Recent Labs  
   18 
 1605 CREA  3.60* BUN  41* WBC  9.7 Temp (24hrs), Av.4 °F (37.4 °C), Min:98.5 °F (36.9 °C), Max:100.2 °F (37.9 °C) Creatinine Clearance (mL/min) or Dialysis: none, but presented to ED in wheelchair Estimated Creatinine Clearance: 13 mL/min (based on Cr of 3.6). Estimated Creatinine Clearance (using IBW):13.0 mL/min Impression/Plan: · Adjusted Levofloxacin to 750mg IV once then 500mg IV q48h · Continue Zosyn to 3.375gm IV q8h. Will provide a 30min IVPB dose for ED, then begin next maintenance dose in 6 hours (midnight tonight) ·  
· Antimicrobial stop date - pending Pharmacy will follow daily and adjust medications as appropriate for renal function and/or serum levels.  
 
Thank you, 
Kolton Augustine, [facility]

## 2018-07-27 NOTE — ED NOTES
Bedside and Verbal shift change report given to Gifty Roberts RN (oncoming nurse) by Iwona Cole RN (offgoing nurse). Report included the following information SBAR, Kardex, ED Summary, Intake/Output, MAR and Recent Results.

## 2018-07-27 NOTE — IP AVS SNAPSHOT
[address] 
[phone] Patient: Cam Griffin MRN: [id number] BVN:[id number] About your hospitalization You were admitted on:  July 27, 2018 You last received care in the:  [facility] CARDIOPULMONARY CARE You were discharged on:  August 11, 2018 Why you were hospitalized Your primary diagnosis was:  Not on File Your diagnoses also included:  Sob (Shortness Of Breath) On Exertion Follow-up Information Follow up With Details Comments Contact Info Jeanie Acevedo MD In 5 days  [facility] [id number] 
[phone] Zach Washington MD In 2 weeks  [id number] 
[phone] [address] [place] [address]  Admission to [facility] for short term rehab [address] [address] [phone] Discharge Orders None A check jed indicates which time of day the medication should be taken. My Medications START taking these medications Instructions Each Dose to Equal  
 Morning Noon Evening Bedtime  
 levothyroxine 50 mcg tablet Commonly known as:  SYNTHROID Your next dose is:  8/12 Take 1 Tab by mouth every morning. 50 mcg  
    
   
   
   
  
 midodrine 10 mg tablet Commonly known as:  Phyllis Radha Your next dose is:  8/11 Take 1 Tab by mouth Before breakfast, lunch, and dinner for 30 days. 10 mg  
    
   
   
   
  
 vancomycin 50 mg/mL oral solution (compounded) Your next dose is:  Every 6 hours Take 2.5 mL by mouth every six (6) hours for 5 days. 125 mg CHANGE how you take these medications Instructions Each Dose to Equal  
 Morning Noon Evening Bedtime  
 furosemide 40 mg tablet Commonly known as:  LASIX What changed:   
- how much to take - when to take this Your next dose is:  8/12 Take 1 Tab by mouth daily. 40 mg  
    
   
   
   
  
 gabapentin 600 mg tablet Commonly known as:  NEURONTIN What changed:  when to take this Your next dose is:  8/11 Take 1 Tab by mouth four (4) times daily. 600 mg  
    
   
   
   
  
 metoprolol tartrate 100 mg IR tablet Commonly known as:  LOPRESSOR What changed:   
- medication strength 
- how much to take Your next dose is:  8/11 evening dose Take 1 Tab by mouth two (2) times a day. 100 mg CONTINUE taking these medications Instructions Each Dose to Equal  
 Morning Noon Evening Bedtime  
 albuterol 90 mcg/actuation inhaler Commonly known as:  PROVENTIL HFA, VENTOLIN HFA, PROAIR HFA Take 2 Puffs by inhalation every six (6) hours as needed for Wheezing. 2 Puff  
    
   
   
   
  
 amiodarone 200 mg tablet Commonly known as:  CORDARONE Your next dose is:  8/11  Evening dose Take 200 mg by mouth two (2) times a day. 200 mg  
    
   
   
   
  
 aspirin 81 mg chewable tablet Your next dose is:  8/12 Take 81 mg by mouth daily. 81 mg  
    
   
   
   
  
 atorvastatin 20 mg tablet Commonly known as:  LIPITOR Your next dose is:  8/12 Take  by mouth daily. budesonide 90 mcg/actuation Aepb inhaler Commonly known as:  Armani Bare Your next dose is:  8/12 Take 2 Puffs by inhalation. 2 Puff CLARINEX 5 mg tablet Generic drug:  desloratadine Your next dose is:  8/12 Take 5 mg by mouth daily. 5 mg  
    
   
   
   
  
 clopidogrel 75 mg Tab Commonly known as:  PLAVIX Your next dose is:  8/12 Take 1 Tab by mouth daily. 75 mg  
    
   
   
   
  
 donepezil 10 mg tablet Commonly known as:  ARICEPT Your next dose is:  8/11 evening Take 1 Tab by mouth nightly. 10 mg  
    
   
   
   
  
 ferrous sulfate 325 mg (65 mg iron) tablet Your next dose is:  8/12 Take  by mouth Daily (before breakfast). finasteride 5 mg tablet Commonly known as:  PROSCAR Your next dose is:  8/12 Take 5 mg by mouth daily. 5 mg  
    
   
   
   
  
 loperamide 2 mg tablet Commonly known as:  IMMODIUM Take 2 mg by mouth four (4) times daily as needed for Diarrhea.  
 2 mg  
    
   
   
   
  
 omeprazole 20 mg capsule Commonly known as:  PRILOSEC Your next dose is:  8/11 Take 20 mg by mouth daily (after lunch). 20 mg  
    
   
   
   
  
 potassium chloride SR 10 mEq tablet Commonly known as:  KLOR-CON 10 Your next dose is:  8/11 evening dose Take 10 mEq by mouth two (2) times a day. 10 mEq  
    
   
   
   
  
 sertraline 25 mg tablet Commonly known as:  ZOLOFT Your next dose is:  8/11 evening Take 25 mg by mouth nightly. 25 mg Where to Get Your Medications Information on where to get these meds will be given to you by the nurse or doctor. ! Ask your nurse or doctor about these medications  
  furosemide 40 mg tablet  
 levothyroxine 50 mcg tablet  
 metoprolol tartrate 100 mg IR tablet  
 midodrine 10 mg tablet  
 vancomycin 50 mg/mL oral solution (compounded) Discharge Instructions None [employer] Announcement We are excited to announce that we are making your provider's discharge notes available to you in The Blaze. You will see these notes when they are completed and signed by the physician that discharged you from your recent hospital stay. If you have any questions or concerns about any information you see in The Blaze, please call the Health Information Department where you were seen or reach out to your Primary Care Provider for more information about your plan of care. Introducing [facility]!    
 Kervin Sunshine introduces The Blaze patient portal. Now you can access parts of your medical record, email your doctor's office, and request medication refills online. 1. In your internet browser, go to [email or web address] 2. Click on the First Time User? Click Here link in the Sign In box. You will see the New Member Sign Up page. 3. Enter your Regen Access Code exactly as it appears below. You will not need to use this code after youve completed the sign-up process. If you do not sign up before the expiration date, you must request a new code. · Regen Access Code: [email or web address] Expires: 11/9/2018 10:37 AM 
 
4. Enter the last four digits of your Social Security Number (xxxx) and Date of Birth (mm/dd/yyyy) as indicated and click Submit. You will be taken to the next sign-up page. 5. Create a Regen ID. This will be your Regen login ID and cannot be changed, so think of one that is secure and easy to remember. 6. Create a Regen password. You can change your password at any time. 7. Enter your Password Reset Question and Answer. This can be used at a later time if you forget your password. 8. Enter your e-mail address. You will receive e-mail notification when new information is available in [address] 9. Click Sign Up. You can now view and download portions of your medical record. 10. Click the Download Summary menu link to download a portable copy of your medical information. If you have questions, please visit the Frequently Asked Questions section of the Regen website. Remember, Regen is NOT to be used for urgent needs. For medical emergencies, dial 911. Now available from your iPhone and Android! Introducing Frantz Zhang As a [facility] patient, I wanted to make you aware of our electronic visit tool called Frantz Zhang. [facility] 24/7 allows you to connect within minutes with a medical provider 24 hours a day, seven days a week via a mobile device or tablet or logging into a secure website from your computer. You can access [employer] from anywhere in the [place]. A virtual visit might be right for you when you have a simple condition and feel like you just dont want to get out of bed, or cant get away from work for an appointment, when your regular [employer] provider is not available (evenings, weekends or holidays), or when youre out of town and need minor care. Electronic visits cost only $49 and if the [facility] 24/7 provider determines a prescription is needed to treat your condition, one can be electronically transmitted to a nearby pharmacy*. Please take a moment to enroll today if you have not already done so. The enrollment process is free and takes just a few minutes. To enroll, please download the [employer] mary beth to your tablet or phone, or visit [email or web address] to enroll on your computer. And, as an [address] [place] [address] patient with a [employer] account, the results of your visits will be scanned into your electronic medical record and your primary care provider will be able to view the scanned results. We urge you to continue to see your regular [employer] provider for your ongoing medical care. And while your primary care provider may not be the one available when you seek a [employer] virtual visit, the peace of mind you get from getting a real diagnosis real time can be priceless. For more information on [employer], view our Frequently Asked Questions (FAQs) at [email or web address]. Sincerely, 
 
Mayra Palm MD 
Chief Medical Officer [facility] Jennifer Remington *:  certain medications cannot be prescribed via [employer] Providers Seen During Your Hospitalization Provider Specialty Primary office phone Nunu Wilson. Kenneth Melendez MD Emergency Medicine [phone] Kara Seip, MD Internal Medicine [phone] Senait Gray MD Hospitalist [phone] Fabian Storey MD Hospitalist [phone] Juan Rascon MD Internal Medicine [phone] Michelle Ocasio MD Internal Medicine [phone] Your Primary Care Physician (PCP) Primary Care Physician Office Phone Office Fax Nelson Gamez [phone] You are allergic to the following Allergen Reactions Valium (Diazepam) Other (comments) Recent Documentation Height Weight BMI Smoking Status 1.676 m 63.3 kg 22.53 kg/m2 Former Smoker Emergency Contacts Name Discharge Info Relation Home Work Mobile [address] CAREGIVER [3] Child [2] 33 [phone] 96 Daniel Honeycutt DISCHARGE CAREGIVER [3] Son [22]   [phone] Patient Belongings The following personal items are in your possession at time of discharge: 
  Dental Appliances: Lowers, Uppers, With patient  Visual Aid: Glasses      Home Medications: None   Jewelry: Watch  Clothing: Belt, Footwear, [place], Pants, Shirt, Undergarments    Other Valuables: None Please provide this summary of care documentation to your next provider. Signatures-by signing, you are acknowledging that this After Visit Summary has been reviewed with you and you have received a copy. Patient Signature:  ____________________________________________________________ Date:  ____________________________________________________________  
  
Yash Sport Provider Signature:  ____________________________________________________________ Date:  ____________________________________________________________

## 2018-07-27 NOTE — CONSULTS
Pt seen and examined. Full consult dictated Agree with CLARENCE Dasilva echo today so we dont need to repeat

## 2018-07-27 NOTE — IP AVS SNAPSHOT
[address] [facility] [address]. 
[phone] Patient: Mar Montoya MRN: [id number] SDZ:[id number] A check jed indicates which time of day the medication should be taken. My Medications START taking these medications Instructions Each Dose to Equal  
 Morning Noon Evening Bedtime  
 levothyroxine 50 mcg tablet Commonly known as:  SYNTHROID Your next dose is:  8/12 Take 1 Tab by mouth every morning. 50 mcg  
    
   
   
   
  
 midodrine 10 mg tablet Commonly known as:  Kinney Bene Your next dose is:  8/11 Take 1 Tab by mouth Before breakfast, lunch, and dinner for 30 days. 10 mg  
    
   
   
   
  
 vancomycin 50 mg/mL oral solution (compounded) Your next dose is:  Every 6 hours Take 2.5 mL by mouth every six (6) hours for 5 days. 125 mg CHANGE how you take these medications Instructions Each Dose to Equal  
 Morning Noon Evening Bedtime  
 furosemide 40 mg tablet Commonly known as:  LASIX What changed:   
- how much to take - when to take this Your next dose is:  8/12 Take 1 Tab by mouth daily. 40 mg  
    
   
   
   
  
 gabapentin 600 mg tablet Commonly known as:  NEURONTIN What changed:  when to take this Your next dose is:  8/11 Take 1 Tab by mouth four (4) times daily. 600 mg  
    
   
   
   
  
 metoprolol tartrate 100 mg IR tablet Commonly known as:  LOPRESSOR What changed:   
- medication strength 
- how much to take Your next dose is:  8/11 evening dose Take 1 Tab by mouth two (2) times a day. 100 mg CONTINUE taking these medications Instructions Each Dose to Equal  
 Morning Noon Evening Bedtime  
 albuterol 90 mcg/actuation inhaler Commonly known as:  PROVENTIL HFA, VENTOLIN HFA, PROAIR HFA  
   
 Take 2 Puffs by inhalation every six (6) hours as needed for Wheezing. 2 Puff  
    
   
   
   
  
 amiodarone 200 mg tablet Commonly known as:  CORDARONE Your next dose is:  8/11  Evening dose Take 200 mg by mouth two (2) times a day. 200 mg  
    
   
   
   
  
 aspirin 81 mg chewable tablet Your next dose is:  8/12 Take 81 mg by mouth daily. 81 mg  
    
   
   
   
  
 atorvastatin 20 mg tablet Commonly known as:  LIPITOR Your next dose is:  8/12 Take  by mouth daily. budesonide 90 mcg/actuation Aepb inhaler Commonly known as:  Velma Basket Your next dose is:  8/12 Take 2 Puffs by inhalation. 2 Puff CLARINEX 5 mg tablet Generic drug:  desloratadine Your next dose is:  8/12 Take 5 mg by mouth daily. 5 mg  
    
   
   
   
  
 clopidogrel 75 mg Tab Commonly known as:  PLAVIX Your next dose is:  8/12 Take 1 Tab by mouth daily. 75 mg  
    
   
   
   
  
 donepezil 10 mg tablet Commonly known as:  ARICEPT Your next dose is:  8/11 evening Take 1 Tab by mouth nightly. 10 mg  
    
   
   
   
  
 ferrous sulfate 325 mg (65 mg iron) tablet Your next dose is:  8/12 Take  by mouth Daily (before breakfast). finasteride 5 mg tablet Commonly known as:  PROSCAR Your next dose is:  8/12 Take 5 mg by mouth daily. 5 mg  
    
   
   
   
  
 loperamide 2 mg tablet Commonly known as:  IMMODIUM Take 2 mg by mouth four (4) times daily as needed for Diarrhea.  
 2 mg  
    
   
   
   
  
 omeprazole 20 mg capsule Commonly known as:  PRILOSEC Your next dose is:  8/11 Take 20 mg by mouth daily (after lunch). 20 mg  
    
   
   
   
  
 potassium chloride SR 10 mEq tablet Commonly known as:  KLOR-CON 10 Your next dose is:  8/11 evening dose Take 10 mEq by mouth two (2) times a day. 10 mEq sertraline 25 mg tablet Commonly known as:  ZOLOFT Your next dose is:  8/11 evening Take 25 mg by mouth nightly. 25 mg Where to Get Your Medications Information on where to get these meds will be given to you by the nurse or doctor. ! Ask your nurse or doctor about these medications  
  furosemide 40 mg tablet  
 levothyroxine 50 mcg tablet  
 metoprolol tartrate 100 mg IR tablet  
 midodrine 10 mg tablet  
 vancomycin 50 mg/mL oral solution (compounded)

## 2018-07-28 NOTE — PROGRESS NOTES
TRANSFER - IN REPORT: 
 
Verbal report received from Linda Cabrera, formerly [facility] on Jose Antes  being received from ER for routine progression of care Report consisted of patients Situation, Background, Assessment and  
Recommendations(SBAR). Information from the following report(s) SBAR was reviewed with the receiving nurse. Opportunity for questions and clarification was provided. Assessment completed upon patients arrival to unit and care assumed

## 2018-07-28 NOTE — ED NOTES
TRANSFER - OUT REPORT: 
 
Verbal report given to Juanita Ni RN(name) on Claudetta Minks  being transferred to [facility] room 2207(unit) for routine progression of care Report consisted of patients Situation, Background, Assessment and  
Recommendations(SBAR). Information from the following report(s) SBAR, Kardex, ED Summary, Intake/Output, MAR and Recent Results was reviewed with the receiving nurse. Lines:    
 
Opportunity for questions and clarification was provided. Patient transported with: 
 [employer]

## 2018-07-28 NOTE — PROGRESS NOTES
Cardiology Progress Note 7/28/2018 12:47 PM 
 
Admit Date: 7/27/2018 Subjective: VQ very low prob for PE. Pacer check shows atrial lead dislodged. Feels ok, no new c/o Visit Vitals  /74 (BP 1 Location: Left arm, BP Patient Position: At rest)  Pulse 96  Temp 98.1 °F (36.7 °C)  Resp 20  
 Ht 5' 6\" (1.676 m)  Wt 66.2 kg (145 lb 15.1 oz)  SpO2 94%  BMI 23.56 kg/m2  
 
07/26 1901 - 07/28 0700 In: -  
Out: 450 [Urine:450] Objective:  
  
Physical Exam: 
VS as above Chest CTA Pacer site ok Card RRR no changes Data Review:  
Labs: BUN 40 Creat 3.3 Telemetry: SR  
 
 
Assessment:  
 
1. Shortness of breath with documented hypoxia, unclear cause. Pneumonia vs CHF 2. Status post recent dual chamber pacemaker placement. Atrial lead needs revision 3. Coronary artery disease with remote non-ST elevation myocardial infarction. 4.  History of paroxysmal atrial fibrillation and sick sinus syndrome with recent pacemaker placement. 5.  Pulmonary hypertension with right heart dysfunction of uncertain cause. 6.  Mild dementia. 7.  Chronic renal insufficiency. 8.  Chronic anemia. 9.  Prior CVA. 10.  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 Plan: Will need to stay and have lead revision Monday. Otherwise cont current Rx

## 2018-07-28 NOTE — CONSULTS
[address] [place] [address] [place] 
MR#: [id number] : 1931 ACCOUNT #: [de-identified] DATE OF SERVICE: 2018 PHYSICIAN:  Shania Andrade MD 
 
REASON FOR CONSULTATION:  Evaluate cardiac status recent pacemaker placement. CHIEF COMPLAINT:  Shortness of breath. HISTORY OF PRESENT ILLNESS:  The patient is an 58-year-old man who is well known to me. He has a history of coronary artery disease and a non-ST elevation MI in July of last year, treated medically. He is also status post recent pacemaker placement approximately 2 weeks ago done for sick sinus syndrome. He has a history of tachybrady syndrome and has had intermittent atrial fibrillation. He has been treated with amiodarone and has had intermittent periods of bradycardia when in sinus rhythm. He underwent uneventful pacemaker placement 2 weeks ago. The patient also has a history of significant chronic renal insufficiency. He also has COPD. He presented to our office earlier today for evaluation with shortness of breath of relatively sudden onset that started about 4 days ago. The patient had an echocardiogram done in our office that showed an EF of around 40% with some right heart dilatation and a dilated pulmonary artery. Estimated pulmonary artery pressures were around 70 mmHg. There was moderate to severe tricuspid regurgitation and mild to moderate mitral regurgitation. The patient's oxygen saturation on room air was in the mid-80s and he was sent to the ER for further evaluation. In the emergency room, the patient was placed on oxygen and his saturation is now around 90%. Creatinine was 3.6. The patient is being admitted by the hospitalist service and a VQ scan is planned. We were asked to see the patient for evaluation.  
 
PAST MEDICAL HISTORY:  As noted above, also mild dementia, prior CVA, chronic anemia due to probable GI blood loss chronic Guillain-[place] syndrome, history of breast cancer. PAST SURGICAL HISTORY:  Status post breast surgery, status post appendectomy, status post throat cancer surgery. CURRENT MEDICATIONS:  Metoprolol, amiodarone, Proscar, aspirin, iron sulfate, gabapentin, Pulmicort, Aricept, Plavix, Lipitor, Zoloft, potassium chloride, Prilosec, furosemide, Clarinex. SOCIAL HISTORY:  Patient is a former smoker and lives in the [facility]. FAMILY HISTORY:  The patient's mother had heart disease. REVIEW OF SYSTEMS:  As noted above, otherwise noncontributory. PHYSICAL EXAMINATION: 
GENERAL:  Reveals an elderly white male wearing oxygen in no acute distress. VITAL SIGNS:  Blood pressure 128/83, pulse 104, respirations 22, temperature 100.2. HEENT:  Pupils are equal.  Oropharynx showed moist oral mucosa. NECK:  Supple, no obvious masses or thyromegaly. No cervical or supraclavicular adenopathy. No carotid bruits, no obvious jugular venous distention. CHEST:  Occasional crackles bilaterally. SKIN:  Warm and dry. No rashes. The pacemaker site appears to be healing well. HEART:  Regular rate and rhythm. II/VI systolic murmur, no gallop. ABDOMEN:  Soft, nontender, no masses or organomegaly. Bowel sounds positive. EXTREMITIES:  No cyanosis, clubbing or edema. Distal pulses not well palpated in the feet. NEUROLOGIC:  No obvious gross motor deficits. LABORATORY DATA:  Hemoglobin 9.2, white count 9.7. BUN 41, creatinine 3.6, potassium 5.0. Chest x-ray:  Cardiomegaly, no clear infiltrates. EKG normal sinus rhythm, borderline left axis deviation, nonspecific ST-T changes. IMPRESSION:  
1. Shortness of breath with documented hypoxia, unclear cause. 2.  Status post recent dual chamber pacemaker placement. 3.  Coronary artery disease with remote non-ST elevation myocardial infarction. 4.  History of paroxysmal atrial fibrillation and sick sinus syndrome with recent pacemaker placement.  
5.  Pulmonary hypertension with right heart dysfunction of uncertain cause. 6.  Mild dementia. 7.  Chronic renal insufficiency. 8.  Chronic anemia. 9.  Prior CVA. 10.  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 RECOMMENDATIONS:  The presentation is somewhat suspicious for pulmonary embolism, particularly given the findings of RV dysfunction on echo and a dilated pulmonary artery. Agree with plans for VQ scan. An echocardiogram was done earlier today and does not need to be repeated. We will arrange for a check of the pacemaker. The patient does not appear to be in overt congestive heart failure. Thank you for this consult. We will follow with you. Jae Landaverde MD 
  
 
[address] [place] [address] /  
D: 07/27/2018 19:58    
T: 07/27/2018 20:47 JOB #: [id number] CC: Rajni Bolden MD 
CC: Donna Pritchett MD

## 2018-07-28 NOTE — PROGRESS NOTES
Hospitalist Progress Note NAME: Carito Dennis :  1931 MRN:  [id number] Assessment / Plan: SOB 
CHF vs PNA  
S/P PPM 
-CXR Diffuse interstitial markings throughout the lungs  
-VQ scan very low probability  
-continue empiric IV antibiotics but no fever or leukocytosis and cough not productive.   
-check proBNP and Echocardiogram.   Will start IV lasix daily 
-consult her cardiologist in AM 
-continue asa / plavix, lopressor and ammiodarone : 
Continue diuresis. Continue empiric abx. Will repeat chest DX tmr AM. V/Q scan \"low probability\" for PE. Echo pending. Cards consult noted.  
  
COPD 
-not in acute exacerbation. Continue  
  
CKD4 Anemia, likely from renal failure 
-follow Cr. Heme - in ER 
-Cr improved from yday  
 
Hx TIA -on plavix 
  
Hx throat cancer 
  
Dementia  
-continue aricept 
  
Depression / anxiety 
-continue zoloft 
  
18.5 - 24.9 Normal weight / Body mass index is 23.56 kg/(m^2). Code status: Full Prophylaxis: Lovenox Recommended Disposition: TBD Subjective: Chief Complaint / Reason for Physician Visit Feeling better. Still some SOB. Discussed with RN events overnight. Review of Systems: 
Symptom Y/N Comments  Symptom Y/N Comments Fever/Chills n   Chest Pain n   
Poor Appetite n   Edema Cough n   Abdominal Pain n   
Sputum    Joint Pain SOB/REYEZ y   Pruritis/Rash Nausea/vomit n   Tolerating PT/OT Diarrhea    Tolerating Diet Constipation    Other Could NOT obtain due to:   
 
Objective: VITALS:  
Last 24hrs VS reviewed since prior progress note. Most recent are: 
Patient Vitals for the past 24 hrs: 
 Temp Pulse Resp BP SpO2  
18 0735 98.2 °F (36.8 °C) 91 18 136/87 99 % 18 0310 98.2 °F (36.8 °C) 98 18 122/80 100 % 18 2238 98.7 °F (37.1 °C) 98 20 129/81 100 % 18 2143 98.5 °F (36.9 °C) 97 20 121/81 95 %  
18 - (!) 104 21 133/83 93 % 18 - (!) 105 19 (!) 130/95 94 %  
07/27/18 1930 - 99 21 (!) 133/95 (!) 87 %  
07/27/18 1915 - 98 25 134/87 90 % 07/27/18 1900 - 100 18 131/86 95 %  
07/27/18 1845 - (!) 102 21 130/83 95 %  
07/27/18 1655 100.2 °F (37.9 °C) (!) 104 22 - 95 %  
07/27/18 1600 - (!) 105 26 128/83 95 %  
07/27/18 1549 - - - - 94 %  
07/27/18 1540 - (!) 105 19 137/89 96 %  
07/27/18 1521 98.5 °F (36.9 °C) (!) 107 16 112/79 90 % Intake/Output Summary (Last 24 hours) at 07/28/18 5903 Last data filed at 07/28/18 5029 Gross per 24 hour Intake                0 ml Output              450 ml Net             -450 ml PHYSICAL EXAM: 
General: Alert, cooperative, no acute distress, elderly, on 1.5L NC 
EENT:  EOMI. Anicteric sclerae. MMM Resp:  DEcreased BS at bases CV:  Regular  rhythm,  No edema GI:  Soft, Non distended, Non tender.  +Bowel sounds Neurologic:  Alert and oriented X 3, normal speech, Psych:   Good insight. Not anxious nor agitated Skin:  No rashes. No jaundice Reviewed most current lab test results and cultures  YES Reviewed most current radiology test results   YES Review and summation of old records today    NO Reviewed patient's current orders and MAR    YES 
PMH/SH reviewed - no change compared to H&P 
________________________________________________________________________ Care Plan discussed with: 
  Comments Patient x Family RN x Care Manager Consultant Multidiciplinary team rounds were held today with , nursing, pharmacist and clinical coordinator. Patient's plan of care was discussed; medications were reviewed and discharge planning was addressed. ________________________________________________________________________ Total NON critical care TIME:  25   Minutes Total CRITICAL CARE TIME Spent:   Minutes non procedure based Comments >50% of visit spent in counseling and coordination of care ________________________________________________________________________ Alex Pastor MD  
 
Procedures: see electronic medical records for all procedures/Xrays and details which were not copied into this note but were reviewed prior to creation of Plan. LABS: 
I reviewed today's most current labs and imaging studies. Pertinent labs include: 
Recent Labs  
   07/27/18 
 1605 WBC  9.7 HGB  9.2* HCT  32.1*  
PLT  305 Recent Labs  
   07/28/18 
 0253  07/27/18 
 1605 NA  144  143  
K  4.9  5.0  
CL  116*  114* CO2  21  22 GLU  87  107* BUN  40*  41* CREA  3.28*  3.60* CA  8.2*  8.8 MG  2.0   --   
PHOS  3.2   --   
INR   --   1.2* Signed: Alex Pastor MD

## 2018-07-28 NOTE — PROGRESS NOTES
Problem: Falls - Risk of 
Goal: *Absence of Falls Document Gwendolyn Arroyo Fall Risk and appropriate interventions in the flowsheet. Outcome: Progressing Towards Goal 
Fall Risk Interventions:

## 2018-07-29 NOTE — PROGRESS NOTES
Hospitalist Progress Note NAME: Xin Beckman :  1931 MRN:  [id number] Assessment / Plan: 
SOB, improving Misplaced Atrial lead CHF vs PNA  
S/P PPM 
-CXR Diffuse interstitial markings throughout the lungs  
-VQ scan very low probability  
-continue empiric IV antibiotics but no fever or leukocytosis and cough not productive.   
-check proBNP and Echocardiogram.   Will start IV lasix daily 
-consult her cardiologist in AM 
-continue asa / plavix, lopressor and ammiodarone : 
Continue diuresis. Continue empiric abx. Will repeat chest DX tmr AM. V/Q scan \"low probability\" for PE. Echo pending. Cards consult noted. : 
Pt with misplaced atrial lead - for correction on Monday. CXR pending for today. Continue diuresis COPD 
-not in acute exacerbation.  Continue  
   
CKD4 Anemia, likely from renal failure 
-follow Cr.  Heme - in ER 
-Cr improved from    
 
Hx TIA -on plavix 
   
Hx throat cancer 
   
Dementia  
-continue aricept 
   
Depression / anxiety 
-continue zoloft 
   
18.5 - 24.9 Normal weight / Body mass index is 23.56 kg/(m^2). 
  
Code status: Full Prophylaxis: Lovenox Recommended Disposition: TBD Subjective: Chief Complaint / Reason for Physician Visit Denies any complaints. Discussed with RN events overnight. Review of Systems: 
Symptom Y/N Comments  Symptom Y/N Comments Fever/Chills n   Chest Pain n   
Poor Appetite n   Edema Cough n   Abdominal Pain n   
Sputum    Joint Pain SOB/REYEZ y   Pruritis/Rash Nausea/vomit    Tolerating PT/OT y Diarrhea    Tolerating Diet y Constipation    Other Could NOT obtain due to:   
 
Objective: VITALS:  
Last 24hrs VS reviewed since prior progress note. Most recent are: 
Patient Vitals for the past 24 hrs: 
 Temp Pulse Resp BP SpO2  
18 0825 98.2 °F (36.8 °C) 99 26 (!) 157/93 93 % 18 0256 98.5 °F (36.9 °C) 98 28 132/88 95 %  
18 2355 98.4 °F (36.9 °C) 94 (!) 32 134/86 96 %  
07/28/18 1941 98.2 °F (36.8 °C) 92 18 122/72 98 %  
07/28/18 1441 98.1 °F (36.7 °C) 95 18 118/77 99 % Intake/Output Summary (Last 24 hours) at 07/29/18 1049 Last data filed at 07/29/18 1014 Gross per 24 hour Intake              540 ml Output              375 ml Net              165 ml PHYSICAL EXAM: 
General: Alert, cooperative, no acute distress   
EENT:  EOMI. Anicteric sclerae. MMM Resp:  Diminished bs at bases CV:  Regular  rhythm,  No edema GI:  Soft, Non distended, Non tender.  +Bowel sounds Neurologic:  Alert and oriented X 3, normal speech, Psych:   Good insight. Not anxious nor agitated Skin:  No rashes. No jaundice Reviewed most current lab test results and cultures  YES Reviewed most current radiology test results   YES Review and summation of old records today    NO Reviewed patient's current orders and MAR    YES 
PMH/SH reviewed - no change compared to H&P 
________________________________________________________________________ Care Plan discussed with: 
  Comments Patient x Family RN x Care Manager Consultant Multidiciplinary team rounds were held today with , nursing, pharmacist and clinical coordinator. Patient's plan of care was discussed; medications were reviewed and discharge planning was addressed. ________________________________________________________________________ Total NON critical care TIME:  25   Minutes Total CRITICAL CARE TIME Spent:   Minutes non procedure based Comments >50% of visit spent in counseling and coordination of care    
________________________________________________________________________ Stefania Pascual MD  
 
Procedures: see electronic medical records for all procedures/Xrays and details which were not copied into this note but were reviewed prior to creation of Plan.    
 
LABS: 
I reviewed today's most current labs and imaging studies. Pertinent labs include: 
Recent Labs  
   07/29/18 
 0611  07/27/18 
 1605 WBC  11.5*  9.7 HGB  9.7*  9.2* HCT  35.0*  32.1*  
PLT  321  305 Recent Labs  
   07/29/18 
 5773  07/28/18 
 0253  07/27/18 
 1605 NA  142  144  143  
K  5.0  4.9  5.0  
CL  114*  116*  114* CO2  22  21  22 GLU  83  87  107* BUN  35*  40*  41* CREA  2.92*  3.28*  3.60* CA  8.9  8.2*  8.8 MG   --   2.0   --   
PHOS   --   3.2   --   
INR   --    --   1.2* Signed: Cee Rodrigues MD

## 2018-07-29 NOTE — PROGRESS NOTES
Pharmacy Automatic Renal Dosing Protocol - Antimicrobials Indication for Antimicrobials: HAP  
PMH:  Hx Breast Ca in male, Ca of throat, Chronic kidney disease IV Current Regimen of Each Antimicrobial: 
Levofloxacin 500mg IV q48h - started  day 3 Zosyn 3.375gm IV q12h - started  day 3 Previous Antimicrobial Therapy: 
None Significant Cultures:  
 PBCx - NG pending Radiology / Imaging results: (X-ray, CT scan or MRI):  
 CXR - Enlarged cardiac silhouette, otherwise no interval change Paralysis, amputations, malnutrition: none Labs: 
Recent Labs  
   18 
 6093  18 
 0253  18 
 1605 CREA  2.92*  3.28*  3.60* BUN  35*  40*  41* WBC  11.5*   --   9.7 Temp (24hrs), Av.2 °F (36.8 °C), Min:97.8 °F (36.6 °C), Max:98.5 °F (36.9 °C) Creatinine Clearance (mL/min) or Dialysis: none, but presented to ED in wheelchair Estimated Creatinine Clearance: 16.1 mL/min (based on Cr of 2.92). Estimated Creatinine Clearance (using IBW):16.1 mL/min Impression/Plan:  
· afebrile, SCr improved · Discussed w/ Dr. Sindy Hoang and ok to change Levofloxacin to PO  
· Continue Zosyn 3.375gm IV q12h for eCrCl < 20mL/min · Antimicrobial stop date - Zosyn IV and LQ PO to be determined Pharmacy will follow daily and adjust medications as appropriate for renal function and/or serum levels.  
 
Thank you, 
Linda Martel, [facility]

## 2018-07-29 NOTE — PROGRESS NOTES
Cardiology Progress Note 7/29/2018 3:06 PM 
 
Admit Date: 7/27/2018 Subjective: Feels ok. No new c/o Visit Vitals  /82 (BP 1 Location: Left arm, BP Patient Position: At rest)  Pulse 94  Temp 97.8 °F (36.6 °C)  Resp 30  
 Ht 5' 6\" (1.676 m)  Wt 66.2 kg (145 lb 15.1 oz)  SpO2 98%  BMI 23.56 kg/m2  
 
07/27 1901 - 07/29 0700 In: 300 [I.V.:300] Out: 825 [Urine:825] Objective:  
  
Physical Exam: 
VS as above Chest scattered crackles Card Irreg irreg no gallop Data Review:  
Labs: BUN 35 Creat 2.9  
K 5.0 Hgb 9.7 WBC 11.5 Telemetry: prob afib R 80 Assessment: 1.  Shortness of breath with documented hypoxia, unclear cause. Pneumonia vs CHF 2.  Status post recent dual chamber pacemaker placement. Atrial lead needs revision 3.  Coronary artery disease with remote non-ST elevation myocardial infarction. 4.  History of paroxysmal atrial fibrillation and sick sinus syndrome with recent pacemaker placement. Probable afib at present 5.  Pulmonary hypertension with right heart dysfunction of uncertain cause. 6.  Mild dementia. 7.  Chronic renal insufficiency. 8.  Chronic anemia. 9.  Prior CVA. 10.  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 Plan: Will proceed with lead revision tomm. Otherwise cont current Rx

## 2018-07-30 NOTE — PROGRESS NOTES
[address] patient's L AC IV to be infiltrated. Explained to patient that nurse was going to draw labs and place new IV. When assess arm to find vein for IV insertion. Patient said he only wanted it in his upper arm. Had patient show nurse where he wanted it. He pointed to two places in [facility]. Attempted IV access in R AC vein but catheter would not thread into vein due to resistance (possible valve?). Explained to patient that IV would have to come out and another attempt to be made. Patient became angry and sat up and started yelling at nurse. Some speech comprehensible and other speech incomprehensible due to speed of speech and venti-mask being in place. Patient did however cuss at nurse and question intelligence of nurse. Nurse told patient that the yelling and cursing was unacceptable behavior and nurse told patient that she would be back in 5 minutes to attempt again once patient had calmed down. As nurse was gathering supplies off bed, patient said, \"I don't know why you made me do that. \" Nurse remained quiet during this time. Placed supplies to be used on computer, and left room. 0330 Entered room to start IV again. Zhao Walters RN, at bedside as witness. Patient said, \"Stick me wherever. \" Placed tourniquet on patient to find a suitable vein. Patient the said he wanted it in his [facility] and pointed to the area he wanted the IV. Vein visible and nurse said she would attempt access there. Applied tourniquet to L arm and as nurse was getting the IV open to stick patient, patient proceeded to point to the vein that he wanted the IV in and said, \"See that's a vein there! \" IV access was obtained on that attempt. 20 g L AC with positive blood return and flush. Patient continued to start an argument and said, \"I don't know why you even asked me where I wanted it in the first place if you didn't want to put it there. \" 
 
Politely explained to patient that there are valves in veins and sometimes the catheter does not go through the valve of the vein and so the IV cannot be left hanging out of the arm. Patient apologized and said, \"I didn't know that. \" 
 
Call bell placed in patient's reach on bedside table and lights turned out.

## 2018-07-30 NOTE — CDMP QUERY
Dr. Ciera Desouza : 
 
Please clarify if this patient is (was) being treated/managed for:  
 
=> Acute diastolic CHF POA 
=>Acute on chronic diastolic CHF POA 
=>Acute systolic CHF POA 
=>Acute on chronic systolic CHF POA 
=> Other explanation of clinical findings 
=> Clinically Undetermined (no explanation for clinical findings) The medical record reflects the following clinical findings, treatment, and risk factors. Risk Factors:  80 WM w/hx: HTN, HLD, COPD, CKD, dementia, throat CA, GERD, Guillain-[place] Clinical Indicators:  Admitted with SOB due to either CHF or PNA per progress notes,  
Treatment: Pending Echo, Cardio consult, lasix IV, cardiac monitor, daily weight Please clarify and document your clinical opinion in the progress notes and discharge summary including the definitive and/or presumptive diagnosis, (suspected or probable), related to the above clinical findings. Please include clinical findings supporting your diagnosis. Thank You, [email or web address] 
[phone]

## 2018-07-30 NOTE — PROGRESS NOTES
Hospitalist Progress Note NAME: Ramirez Cai :  1931 MRN:  [id number] Interim Hospital Summary: 80 y.o. male whom presented on 2018 with Assessment / Plan: 
Misplaced Atrial lead - appreciate cardiology input; schedule to have lead revision today SOB Pulmonary edema S/P PPM 
HTN 
- CXR Cardiomegaly with increased interstitial and parenchymal pulmonary edema. - VQ scan very low probability  
- continue with IV lasix daily; daily weight 
- continue asa / plavix, lopressor and amiodarone 
- echo to be done COPD 
- not in acute exacerbation.  Continue with arnuity and duoneb 
- complete for 5 days of Levo 
- O2 Sat 95% @ 4L via n/c; wean O2 as tolerate 
   
CKD4 Anemia, likely from renal failure - Creat maintains at 2.9 
  
Hx TIA 
- on plavix 
   
Hx throat cancer 
   
Dementia  
- continue aricept 
   
Depression / anxiety 
- continue zoloft 
   
18.5 - 24.9 Normal weight / Body mass index is 23.56 kg/(m^2).    
Code status: Full Prophylaxis: Lovenox Recommended Disposition: TBD Subjective: Chief Complaint / Reason for Physician Visit \"I am doing ok\". Discussed with RN events overnight. Review of Systems: 
Symptom Y/N Comments  Symptom Y/N Comments Fever/Chills n   Chest Pain n   
Poor Appetite    Edema Cough y   Abdominal Pain Sputum n   Joint Pain SOB/REYEZ y   Pruritis/Rash Nausea/vomit n   Tolerating PT/OT Diarrhea    Tolerating Diet Constipation    Other Could NOT obtain due to:   
 
Objective: VITALS:  
Last 24hrs VS reviewed since prior progress note. Most recent are: 
Patient Vitals for the past 24 hrs: 
 Temp Pulse Resp BP SpO2  
18 1056 97.7 °F (36.5 °C) (!) 112 16 150/84 93 % 18 0717 97.6 °F (36.4 °C) 95 16 148/76 100 % 18 0314 97.7 °F (36.5 °C) 98 16 117/69 99 % 18 0050 98.6 °F (37 °C) 95 24 125/75 98 %  
18 2019 - 94 - 120/73 -  
18 1958 98.6 °F (37 °C) 99 22 111/76 95 %  
07/29/18 1849 - (!) 109 - (!) 161/97 -  
07/29/18 1740 - 100 - - 96 %  
07/29/18 1700 - - - - 94 %  
07/29/18 1509 98 °F (36.7 °C) 93 26 142/87 95 % Intake/Output Summary (Last 24 hours) at 07/30/18 1434 Last data filed at 07/30/18 1028 Gross per 24 hour Intake                0 ml Output             1900 ml Net            -1900 ml PHYSICAL EXAM: 
General: Ill appearing. Alert, cooperative, no acute distress   
EENT:  EOMI. Anicteric sclerae. MMM Resp:  Bilateral rhonchi with decreased breath sounds at bases. no wheezing or rales. No accessory muscle use CV:  Regular  rhythm,  No edema GI:  Soft, Non distended, Non tender.  +Bowel sounds Neurologic:  Alert and oriented X 3, normal speech, Psych:   Good insight. Not anxious nor agitated Skin:  No rashes. No jaundice Reviewed most current lab test results and cultures  YES Reviewed most current radiology test results   YES Review and summation of old records today    NO Reviewed patient's current orders and MAR    YES 
PMH/ reviewed - no change compared to H&P 
________________________________________________________________________ Care Plan discussed with: 
  Comments Patient y Family RN y   
Care Manager y Consultant     
                 y Multidiciplinary team rounds were held today with , nursing, pharmacist and clinical coordinator. Patient's plan of care was discussed; medications were reviewed and discharge planning was addressed. ________________________________________________________________________ Total NON critical care TIME:  30 minutes Total CRITICAL CARE TIME Spent:   Minutes non procedure based Comments >50% of visit spent in counseling and coordination of care    
________________________________________________________________________ Perfecto Martinez NP Procedures: see electronic medical records for all procedures/Xrays and details which were not copied into this note but were reviewed prior to creation of Plan. LABS: 
I reviewed today's most current labs and imaging studies. Pertinent labs include: 
Recent Labs  
   07/30/18 
 0335 07/29/18 
 0611  07/27/18 
 1605 WBC  13.8*  11.5*  9.7 HGB  9.8*  9.7*  9.2* HCT  34.6*  35.0*  32.1*  
PLT  361  321  305 Recent Labs  
   07/30/18 0335 07/29/18 
 1869  07/28/18 
 0253  07/27/18 
 1605 NA  144  142  144  143  
K  5.0  5.0  4.9  5.0  
CL  112*  114*  116*  114* CO2  22  22  21  22 GLU  92  83  87  107* BUN  36*  35*  40*  41* CREA  2.94*  2.92*  3.28*  3.60* CA  9.0  8.9  8.2*  8.8 MG   --    --   2.0   --   
PHOS   --    --   3.2   --   
INR   --    --    --   1.2* Signed: )Sudhir Masters, NP

## 2018-07-30 NOTE — PROGRESS NOTES
Cardiology Progress Note 7/30/2018 12:52 PM 
 
Admit Date: 7/27/2018 Subjective: Stable. For lead revision today. No new c/o Visit Vitals  /84 (BP 1 Location: Left arm, BP Patient Position: At rest)  Pulse (!) 112  Temp 97.7 °F (36.5 °C)  Resp 16  
 Ht 5' 6\" (1.676 m)  Wt 66.2 kg (145 lb 15.1 oz)  SpO2 93%  BMI 23.56 kg/m2  
 
07/28 1901 - 07/30 0700 In: 540 [P.O.:240; I.V.:300] Out: 1825 [GWKSY:1124] Objective:  
  
Physical Exam: 
VS as above Chest scattered rhonchi bilat Card RRR no gallop Data Review:  
Labs: BUN 36 
Creat 2.9 
K 5.0 WBC 13.8 Hgb 9.8 Telemetry: SR  
 
 
Assessment: 1.  Shortness of breath with documented hypoxia, unclear cause. Pneumonia vs CHF 2.  Status post recent dual chamber pacemaker placement. Atrial lead needs revision 3.  Coronary artery disease with remote non-ST elevation myocardial infarction. 4.  History of paroxysmal atrial fibrillation and sick sinus syndrome with recent pacemaker placement. Probable afib at present 5.  Pulmonary hypertension with right heart dysfunction of uncertain cause. 6.  Mild dementia. 7.  Chronic renal insufficiency. 8.  Chronic anemia. 9.  Prior CVA. 10.  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 Plan: For lead revision later today. Should be able to go home tomm.

## 2018-07-30 NOTE — PROGRESS NOTES
1700- patient complains of sob, work of breathing increased. MD rodriguez( spoke [place] Ballard) order to give a dose of lasix and nebulizer treatment. 1820- Update Dr. Rachel Siu patient was placed on a venti mask which he now says feels like is helping. Working of breathing decreased oxygen stats are now in the high 90's. Order to give another dose of lasix and chest xray.

## 2018-07-31 NOTE — PROGRESS NOTES
Cardiology Progress Note 7/31/2018 8:14 AM 
 
Admit Date: 7/27/2018 Subjective: Having some dark stool; unclear if it is heme positive. No other new c/o Visit Vitals  /90 (BP 1 Location: Right arm, BP Patient Position: At rest)  Pulse (!) 101  Temp 98.1 °F (36.7 °C)  Resp 18  Ht 5' 6\" (1.676 m)  Wt 67.2 kg (148 lb 2.4 oz)  SpO2 93%  BMI 23.91 kg/m2  
 
07/29 1901 - 07/31 0700 In: 850 [P.O.:600; I.V.:250] Out: 3500 [Urine:3500] Objective:  
  
Physical Exam: 
VS as above Chest scattered bilat crackles Card Irreg irreg no gallop Data Review:  
Labs: BUN 38 Creat 2.9 Hgb 10.1 Telemetry: afib / flutter R 100 Assessment:  
 
  
  
1.  Shortness of breath with documented hypoxia, unclear cause. Pneumonia vs CHF 2.  Status post recent dual chamber pacemaker placement. S/p atrial lead revision 3.  Coronary artery disease with remote non-ST elevation myocardial infarction. 4.  History of paroxysmal atrial fibrillation and sick sinus syndrome with recent pacemaker placement. Recurrent afib/ flutter 5.  Pulmonary hypertension with right heart dysfunction of uncertain cause. 6.  Mild dementia. 7.  Chronic renal insufficiency. 8.  Chronic anemia. 9.  Prior CVA. 10.  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 Plan: In persistant afib. Need to consider anticoag but first must address issue of possible melena. If we start warfarin or a NOAC would d/c Plavix.

## 2018-07-31 NOTE — PROGRESS NOTES
Hospitalist Progress Note NAME: Emily Sandoval :  1931 MRN:  [id number] Interim Hospital Summary: 80 y.o. male whom presented on 2018 with Assessment / Plan: 
Misplaced Atrial lead - appreciate cardiology input; atrial revision done by Dr. Jese Reilly on  
- Cardiology recommended [facility] therapy. Pt did have heme (+) stool on admission. Melan/hematochezia 
- Consulted GI in regards AC therapy with heme (+) stool. Hgb 10 today, was 12.8 on May, 2015.  
- colonoscopy 2016: Normal terminal ileum, Left-side colonic diverticulosis, Single diminutive 1-2mm sessile benign-appearing rectal polyp at12cm; removed with cold biopsy, Small internal hemorrhoids, felt to be the likely source of bleeding SOB 
CAD Pulmonary edema Recurrent Afib/flutter S/P PPM 
HTN 
- CXR Cardiomegaly with increased interstitial and parenchymal pulmonary edema. - VQ scan very low probability  
- continue with IV lasix daily; daily weight 67.2kg 
- continue asa / plavix, lopressor and amiodarone 
- echo: Systolic function was moderately reduced. Ejection fraction was estimated to be 40 %. Suboptimal endocardial visualization limits wall motion analysis. There was mild diffuse hypokinesis. 
  
COPD 
- not in acute exacerbation.  Continue with arnuity and duoneb 
- complete for 5 days of Levo 
- O2 Sat 95% @ 2-4L via n/c; wean O2 as tolerate 
   
CKD4 Anemia, likely from renal failure - Creat 2.8 
  Hx TIA 
- on plavix 
   
Hx throat cancer 
   
Dementia  
- continue aricept 
   
Depression / anxiety 
- continue zoloft 
   
18.5 - 24.9 Normal weight / Body mass index is 23.56 kg/(m^2).    
Code status: Full Prophylaxis: Lovenox Recommended Disposition: TBD; to be evaluated by PT/OT 
  
  
 
Subjective: Chief Complaint / Reason for Physician Visit \"I am ok\". Discussed with RN events overnight. Review of Systems: 
Symptom Y/N Comments  Symptom Y/N Comments Fever/Chills n   Chest Pain n   
Poor Appetite    Edema Cough    Abdominal Pain Sputum    Joint Pain SOB/REYEZ n   Pruritis/Rash Nausea/vomit    Tolerating PT/OT Diarrhea n   Tolerating Diet Constipation    Other Could NOT obtain due to:   
 
Objective: VITALS:  
Last 24hrs VS reviewed since prior progress note. Most recent are: 
Patient Vitals for the past 24 hrs: 
 Temp Pulse Resp BP SpO2  
07/31/18 1449 97.8 °F (36.6 °C) 100 18 119/77 96 % 07/31/18 1320 - - - - 96 % 07/31/18 1317 98.3 °F (36.8 °C) 99 20 132/76 91 %  
07/31/18 1010 98 °F (36.7 °C) (!) 111 18 120/88 90 % 07/31/18 0717 98.1 °F (36.7 °C) (!) 101 18 159/90 93 % 07/31/18 0355 98.3 °F (36.8 °C) 97 16 (!) 150/91 97 % 07/31/18 0210 98.3 °F (36.8 °C) 96 14 (!) 144/93 97 % 07/30/18 2300 98.5 °F (36.9 °C) 96 18 118/77 94 % 07/30/18 2253 - (!) 109 - - (!) 83 % 07/30/18 2200 - 77 - 96/69 -  
07/30/18 2100 - 94 - 93/81 -  
07/30/18 2000 - (!) 114 - 107/80 -  
07/30/18 1930 - (!) 115 - 124/83 96 % 07/30/18 1902 97.9 °F (36.6 °C) 99 16 139/85 97 % 07/30/18 1831 - (!) 107 - 124/81 (!) 85 % 07/30/18 1815 - (!) 115 - 105/72 95 % 07/30/18 1800 - (!) 115 - 90/70 94 % 07/30/18 1745 - (!) 116 - 107/84 96 % 07/30/18 1738 - (!) 115 - 137/80 97 % 07/30/18 1730 98.1 °F (36.7 °C) (!) 110 16 137/80 96 % Intake/Output Summary (Last 24 hours) at 07/31/18 1631 Last data filed at 07/31/18 5922 Gross per 24 hour Intake              850 ml Output              750 ml Net              100 ml PHYSICAL EXAM: 
General: Ill appearing. Alert, cooperative, no acute distress   
EENT:  EOMI. Anicteric sclerae. MMM, very Lummi Resp:  Coarse breath sounds throughout, no wheezing or rales. No accessory muscle use CV:  irregular  rhythm,  No edema GI:  Soft, Non distended, Non tender.  +Bowel sounds Neurologic:  Alert and oriented X 3, normal speech, Psych:   Fair insight. Not anxious nor agitated Skin:  No rashes. No jaundice Reviewed most current lab test results and cultures  YES Reviewed most current radiology test results   YES Review and summation of old records today    NO Reviewed patient's current orders and MAR    YES 
PMH/SH reviewed - no change compared to H&P 
________________________________________________________________________ Care Plan discussed with: 
  Comments Patient y Family RN y   
Care Manager Consultant Multidiciplinary team rounds were held today with , nursing, pharmacist and clinical coordinator. Patient's plan of care was discussed; medications were reviewed and discharge planning was addressed. ________________________________________________________________________ Total NON critical care TIME:  30  Minutes Total CRITICAL CARE TIME Spent:   Minutes non procedure based Comments >50% of visit spent in counseling and coordination of care    
________________________________________________________________________ Hyvickeyun Paige Alonso NP Procedures: see electronic medical records for all procedures/Xrays and details which were not copied into this note but were reviewed prior to creation of Plan. LABS: 
I reviewed today's most current labs and imaging studies. Pertinent labs include: 
Recent Labs  
   07/31/18 
 0245  07/30/18 
 0335  07/29/18 
 4044 WBC  11.0  13.8*  11.5* HGB  10.1*  9.8*  9.7* HCT  35.1*  34.6*  35.0*  
PLT  335  361  321 Recent Labs  
   07/31/18 
 0245  07/30/18 
 0335  07/29/18 
 4427 NA  145  144  142  
K  4.3  5.0  5.0  
CL  112*  112*  114* CO2  25  22  22 GLU  79  92  83 BUN  38*  36*  35* CREA  2.86*  2.94*  2.92* CA  9.1  9.0  8.9 Signed: )Vilma Lion, NP

## 2018-07-31 NOTE — PROGRESS NOTES
Spiritual Care Assessment/Progress Note [facility] 
 
 
NAME: Noelle Quesada      MRN: [id number] AGE: 80 y.o. SEX: male Catholic Affiliation: Dhara Parsons Language: Juliet Phlegm 7/31/2018     Total Time (in minutes): 5 Spiritual Assessment begun in [facility] CARDIOPULMONARY CARE through conversation with: 
  
    [x]Patient        [] Family    [] Friend(s) Reason for Consult: Initial/Spiritual assessment, patient floor Spiritual beliefs: (Please include comment if needed) 
   [] Identifies with a bao tradition:     
   [] Supported by a bao community:        
   [] Claims no spiritual orientation:       
   [] Seeking spiritual identity:            
   [] Adheres to an individual form of spirituality:       
   [x] Not able to assess:                   
 
    
Identified resources for coping:  
   [] Prayer                           
   [] Music                  [] Guided Imagery 
   [] Family/friends                 [] Pet visits [] Devotional reading                         [x] Unknown 
   [] Other:                                          
 
 
Interventions offered during this visit: (See comments for more details) Patient Interventions: Iconic (affirming the presence of God/Higher Power) Plan of Care: 
 
 [] Support spiritual and/or cultural needs  
 [] Support AMD and/or advance care planning process    
 [] Support grieving process 
 [] Coordinate Rites and/or Rituals  
 [] Coordination with community clergy [] No spiritual needs identified at this time 
 [] Detailed Plan of Care below (See Comments)  [] Make referral to Music Therapy 
[] Make referral to Pet Therapy    
[] Make referral to Addiction services 
[] Make referral to [facility] 
[] Make referral to Spiritual Care Partner 
[] No future visits requested       
[x] Follow up visits as needed Comments: Attempted to complete initial spiritual assessment with patient on [facility] due to length of stay. Patient appeared to be resting and did not respond to his name being called or knock on the door. No family is currently present at bedside. Left pastoral care card on patient's bedside table. Chaplain Boris Bryan M.Div.  Paging Service [phone])

## 2018-07-31 NOTE — CONSULTS
GI Consultation Note Dajasharon Sick) NAME: Jose Richardson : 1931 MRN: [id number] ATTG: DANIELLE Kitchen NP  PCP: Cody Mancia MD 
Date/Time:  2018 6:27 PM 
Subjective:  
REASON FOR CONSULT:     
Jami Arriaga is a 80 y.o.  male with hx Afib on ASA and Plavix therapy, CHF, CAD, tachy-wander syndrome, CRI, who I was asked to see for evaluation of guaiac positive stool. He was admitted 18 and has undergone atrial wire repositioning and medical management of his CHF. He is noted have developed dark loose stool yesterday with no significant change in his Hgb. He denies abdominal pain and is eating comfortably without complaints. He denies N/V. His stool was guaiac negative on admission , but guaiac positive today. He is reportedly on omeprazole as OP, but no PPI was continued here on admission. His Hgb measured 2016 was 11.2, whereas is was 9.2 on admission here and 10.1 today. He had 2 BMs yesterday, but none so far today. Prior GI evaluation by me as OP in 16 for BRBPR included colonoscopy demonstrating left-side diverticulosis, internal hemorrhoids , diminutive 1mm rectal adenoma removed, and normal terminal ileum. He had undergone EGD and colonoscopy with a different gastroenterologist 8/11/15 with finding of left-side-diverticulosis, and removal of 3 \"colon polyps\" that were all normal tissue, in addition to finding a hiatal hernia, focal intestinal metaplasia on gastric biopsies, and nl esophageal biopsies. It is unclear why he underwent esophageal dilatation at that time. He was noted to be on anticoagulation with ASA and Plavix in  and . Past Medical History:  
Diagnosis Date  Cancer ([facility] [address] 2005  
 throat Cancer; laser surgery, no chemo, 30 radiation Tx, breast ca  Chronic kidney disease   
 stage IV:   Dr Yudy Barnhart ([phone])  Chronic obstructive pulmonary disease ([facility] [address]  Dysphagia 2015  GERD (gastroesophageal reflux disease)  Guillain-[facility]) 1990  
 as of 8/3/15 pt has aching/weakness in legs: Dr Melissa Simmons History of heart attack 1981 Dr Bush Miners  Koi (hard of hearing) doesn't wear hearing aides  Hypercholesteremia  Hypertension  Personal history of colonic polyps 8/11/2015  Senile dementia Dr Haley [place] Past Surgical History:  
Procedure Laterality Date  BREAST SURGERY PROCEDURE UNLISTED    
 removed breast cancer  COLONOSCOPY N/A 11/10/2016 COLONOSCOPY performed by Luis Enrique Rankin MD at [facility] ENDOSCOPY  COLONOSCOPY,DIAGNOSTIC  8/11/2015  COLONOSCOPY,DIAGNOSTIC  11/10/2016  COLONOSCOPY,REMV LESN,SNARE  8/11/2015  HX APPENDECTOMY  HX OTHER SURGICAL  2005  
 throat cancer removal  
 HX OTHER SURGICAL  2005  
 radiation treatments Social History Substance Use Topics  Smoking status: Former Smoker Packs/day: 1.50 Years: 50.00 Quit date: 7/1/2004  Smokeless tobacco: Never Used  Alcohol use No  
  
Family History Problem Relation Age of Onset  Heart Disease Mother  Other Father   
  blood poisoning  Heart Disease Sister  Cancer Sister   
  breast  
 Cancer Child   
  breast  
 High Cholesterol Child  Heart defect Child Allergies Allergen Reactions  Valium [Diazepam] Other (comments) Home Medications: 
Prior to Admission Medications Prescriptions Last Dose Informant Patient Reported? Taking? albuterol (PROVENTIL HFA, VENTOLIN HFA, PROAIR HFA) 90 mcg/actuation inhaler   Yes No  
Sig: Take 2 Puffs by inhalation every six (6) hours as needed for Wheezing. amiodarone (CORDARONE) 200 mg tablet   Yes No  
Sig: Take 200 mg by mouth two (2) times a day. aspirin 81 mg chewable tablet   Yes No  
Sig: Take 81 mg by mouth daily. atorvastatin (LIPITOR) 20 mg tablet   Yes No  
Sig: Take  by mouth daily.   
budesonide (PULMICORT FLEXHALER) 90 mcg/actuation aepb inhaler   Yes No Sig: Take 2 Puffs by inhalation. clopidogrel (PLAVIX) 75 mg tab   No No  
Sig: Take 1 Tab by mouth daily. desloratadine (CLARINEX) 5 mg tablet   Yes No  
Sig: Take 5 mg by mouth daily. donepezil (ARICEPT) 10 mg tablet   No No  
Sig: Take 1 Tab by mouth nightly. ferrous sulfate 325 mg (65 mg iron) tablet   Yes No  
Sig: Take  by mouth Daily (before breakfast). finasteride (PROSCAR) 5 mg tablet   Yes No  
Sig: Take 5 mg by mouth daily. furosemide (LASIX) 40 mg tablet   Yes No  
Sig: Take 20 mg by mouth two (2) times a day.  
gabapentin (NEURONTIN) 600 mg tablet   No No  
Sig: Take 1 Tab by mouth four (4) times daily. Patient taking differently: Take 600 mg by mouth three (3) times daily. loperamide (IMMODIUM) 2 mg tablet   Yes No  
Sig: Take 2 mg by mouth four (4) times daily as needed for Diarrhea.  
metoprolol tartrate (LOPRESSOR) 50 mg tablet   No No  
Sig: Take 1 Tab by mouth two (2) times a day. omeprazole (PRILOSEC) 20 mg capsule   Yes No  
Sig: Take 20 mg by mouth daily (after lunch). potassium chloride SR (KLOR-CON 10) 10 mEq tablet   Yes No  
Sig: Take 10 mEq by mouth two (2) times a day. sertraline (ZOLOFT) 25 mg tablet   Yes No  
Sig: Take 25 mg by mouth nightly. Facility-Administered Medications: None Hospital medications: 
Current Facility-Administered Medications Medication Dose Route Frequency  furosemide (LASIX) injection 40 mg  40 mg IntraVENous DAILY  ADDaptor  vancomycin (VANCOCIN) 1,000 mg injection  0.9% sodium chloride (MBP/ADV) infusion  sodium chloride (NS) flush 5-10 mL  5-10 mL IntraVENous Q8H  
 sodium chloride (NS) flush 5-10 mL  5-10 mL IntraVENous PRN  
 acetaminophen (TYLENOL) tablet 650 mg  650 mg Oral Q4H PRN  
 HYDROcodone-acetaminophen (NORCO) 5-325 mg per tablet 1 Tab  1 Tab Oral Q4H PRN  
 levoFLOXacin (LEVAQUIN) tablet 500 mg  500 mg Oral EVERY OTHER DAY  albuterol-ipratropium (DUO-NEB) 2.5 MG-0.5 MG/3 ML  3 mL Nebulization Q4H PRN  
 albuterol (PROVENTIL HFA, VENTOLIN HFA, PROAIR HFA) inhaler 2 Puff  2 Puff Inhalation Q6H PRN  
 amiodarone (CORDARONE) tablet 200 mg  200 mg Oral BID  fluticasone furoate (ARNUITY ELLIPTA) 100 mcg/puff  1 Puff Inhalation DAILY  finasteride (PROSCAR) tablet 5 mg  5 mg Oral DAILY  sertraline (ZOLOFT) tablet 25 mg  25 mg Oral QHS  metoprolol tartrate (LOPRESSOR) tablet 50 mg  50 mg Oral BID  
 donepezil (ARICEPT) tablet 10 mg  10 mg Oral QHS  ferrous sulfate tablet 325 mg  1 Tab Oral DAILY WITH BREAKFAST  clopidogrel (PLAVIX) tablet 75 mg  75 mg Oral DAILY  aspirin chewable tablet 81 mg  81 mg Oral DAILY  atorvastatin (LIPITOR) tablet 20 mg  20 mg Oral DAILY  ondansetron (ZOFRAN) injection 4 mg  4 mg IntraVENous Q4H PRN  
 
REVIEW OF SYSTEMS:   
 [x]    Total of 11 systems reviewed as follows: 
Const:   negative fever, negative chills, negative weight loss Eyes:   negative diplopia or visual changes, negative eye pain ENT:   negative coryza, negative sore throat Resp:   negative cough, hemoptysis, dyspnea Cards:  negative for chest pain, palpitations, lower extremity edema :  negative for frequency, dysuria and hematuria Skin:   negative for rash and pruritus Heme:   negative for easy bruising and gum/nose bleeding MS:  negative for myalgias, arthralgias, back pain and muscle weakness Neurolo:  negative for headaches, dizziness, vertigo, memory problems Psych:  negative for feelings of anxiety, depression Pertinent Positives include : 
 
Objective: VITALS:   
Visit Vitals  /77 (BP 1 Location: Right arm, BP Patient Position: At rest)  Pulse 100  Temp 97.8 °F (36.6 °C)  Resp 18  Ht 5' 6\" (1.676 m)  Wt 67.2 kg (148 lb 2.4 oz)  SpO2 96%  BMI 23.91 kg/m2 Temp (24hrs), Av.2 °F (36.8 °C), Min:97.8 °F (36.6 °C), Max:98.5 °F (36.9 °C) PHYSICAL EXAM:  
General:    Alert, cooperative, no distress, appears stated age. Head:   Normocephalic, without obvious abnormality, atraumatic. Eyes:   Conjunctivae clear, anicteric sclerae. Pupils are equal 
Nose:  Nares normal. No drainage or sinus tenderness. Throat:    Lips, mucosa, and tongue normal.  No Thrush.  +Edentulous Neck:  Supple, symmetrical,  no adenopathy, thyroid: non tender Back:    Symmetric,  No CVA tenderness. Lungs:   CTA bilaterally. No wheezing/rhonchi/rales. Chest wall:  No tenderness or deformity. No Accessory muscle use. Heart:   Regular rate and rhythm,  no murmur, rub or gallop. Abdomen:   Soft, non-tender. Not distended. NABS. No masses Extremities: Atraumatic, No cyanosis. No edema. No clubbing Skin:     Texture, turgor normal. No rashes/lesions/jaundice Lymph: Cervical, supraclavicular normal. 
Psych:  Good insight. Not depressed. Not anxious or agitated. Neurologic: EOMs intact. Normal strength, A/O X 3. LAB DATA REVIEWED:   
Recent Results (from the past 48 hour(s)) CBC WITH AUTOMATED DIFF Collection Time: 07/30/18  3:35 AM  
Result Value Ref Range WBC 13.8 (H) 4.1 - 11.1 K/uL  
 RBC 4.22 4.10 - 5.70 M/uL HGB 9.8 (L) 12.1 - 17.0 g/dL HCT 34.6 (L) 36.6 - 50.3 % MCV 82.0 80.0 - 99.0 FL  
 MCH 23.2 (L) 26.0 - 34.0 PG  
 MCHC 28.3 (L) 30.0 - 36.5 g/dL RDW 29.4 (H) 11.5 - 14.5 % PLATELET 977 935 - 957 K/uL MPV 9.6 8.9 - 12.9 FL  
 NRBC 0.0 0  WBC ABSOLUTE NRBC 0.00 0.00 - 0.01 K/uL NEUTROPHILS 84 (H) 32 - 75 % LYMPHOCYTES 8 (L) 12 - 49 % MONOCYTES 7 5 - 13 % EOSINOPHILS 0 0 - 7 % BASOPHILS 0 0 - 1 % IMMATURE GRANULOCYTES 1 (H) 0.0 - 0.5 % ABS. NEUTROPHILS 11.6 (H) 1.8 - 8.0 K/UL  
 ABS. LYMPHOCYTES 1.1 0.8 - 3.5 K/UL  
 ABS. MONOCYTES 0.9 0.0 - 1.0 K/UL  
 ABS. EOSINOPHILS 0.0 0.0 - 0.4 K/UL  
 ABS. BASOPHILS 0.0 0.0 - 0.1 K/UL  
 ABS. IMM. GRANS. 0.1 (H) 0.00 - 0.04 K/UL  
 DF AUTOMATED METABOLIC PANEL, BASIC Collection Time: 07/30/18  3:35 AM  
Result Value Ref Range Sodium 144 136 - 145 mmol/L Potassium 5.0 3.5 - 5.1 mmol/L Chloride 112 (H) 97 - 108 mmol/L  
 CO2 22 21 - 32 mmol/L Anion gap 10 5 - 15 mmol/L Glucose 92 65 - 100 mg/dL BUN 36 (H) 6 - 20 MG/DL Creatinine 2.94 (H) 0.70 - 1.30 MG/DL  
 BUN/Creatinine ratio 12 12 - 20 GFR est AA 25 (L) >60 ml/min/1.73m2 GFR est non-AA 20 (L) >60 ml/min/1.73m2 Calcium 9.0 8.5 - 10.1 MG/DL  
EKG, 12 LEAD, INITIAL Collection Time: 07/31/18  2:20 AM  
Result Value Ref Range Ventricular Rate 103 BPM  
 Atrial Rate 220 BPM  
 QRS Duration 98 ms Q-T Interval 362 ms QTC Calculation (Bezet) 474 ms Calculated R Axis -14 degrees Calculated T Axis 10 degrees Diagnosis Atrial flutter with variable AV block When compared with ECG of 27-JUL-2018 15:34, Atrial flutter has replaced Sinus rhythm Confirmed by Christelle Lewis ([id number]) on 7/31/2018 00:59:84 AM 
  
METABOLIC PANEL, BASIC Collection Time: 07/31/18  2:45 AM  
Result Value Ref Range Sodium 145 136 - 145 mmol/L Potassium 4.3 3.5 - 5.1 mmol/L Chloride 112 (H) 97 - 108 mmol/L  
 CO2 25 21 - 32 mmol/L Anion gap 8 5 - 15 mmol/L Glucose 79 65 - 100 mg/dL BUN 38 (H) 6 - 20 MG/DL Creatinine 2.86 (H) 0.70 - 1.30 MG/DL  
 BUN/Creatinine ratio 13 12 - 20 GFR est AA 25 (L) >60 ml/min/1.73m2 GFR est non-AA 21 (L) >60 ml/min/1.73m2 Calcium 9.1 8.5 - 10.1 MG/DL  
CBC WITH AUTOMATED DIFF Collection Time: 07/31/18  2:45 AM  
Result Value Ref Range WBC 11.0 4.1 - 11.1 K/uL  
 RBC 4.31 4.10 - 5.70 M/uL  
 HGB 10.1 (L) 12.1 - 17.0 g/dL HCT 35.1 (L) 36.6 - 50.3 % MCV 81.4 80.0 - 99.0 FL  
 MCH 23.4 (L) 26.0 - 34.0 PG  
 MCHC 28.8 (L) 30.0 - 36.5 g/dL RDW 28.9 (H) 11.5 - 14.5 % PLATELET 633 811 - 528 K/uL MPV 9.3 8.9 - 12.9 FL  
 NRBC 0.0 0  WBC ABSOLUTE NRBC 0.00 0.00 - 0.01 K/uL NEUTROPHILS 76 (H) 32 - 75 % LYMPHOCYTES 11 (L) 12 - 49 % MONOCYTES 10 5 - 13 % EOSINOPHILS 2 0 - 7 % BASOPHILS 0 0 - 1 % IMMATURE GRANULOCYTES 1 (H) 0.0 - 0.5 % ABS. NEUTROPHILS 8.4 (H) 1.8 - 8.0 K/UL  
 ABS. LYMPHOCYTES 1.2 0.8 - 3.5 K/UL  
 ABS. MONOCYTES 1.1 (H) 0.0 - 1.0 K/UL  
 ABS. EOSINOPHILS 0.2 0.0 - 0.4 K/UL  
 ABS. BASOPHILS 0.0 0.0 - 0.1 K/UL  
 ABS. IMM. GRANS. 0.1 (H) 0.00 - 0.04 K/UL  
 DF AUTOMATED    
 RBC COMMENTS ANISOCYTOSIS 3+ 
    
 RBC COMMENTS DANTE CELLS 1+ RBC COMMENTS SCHISTOCYTES 1+ RBC COMMENTS HYPOCHROMIA PRESENT 
    
OCCULT BLOOD, STOOL Collection Time: 07/31/18  2:46 AM  
Result Value Ref Range Occult blood, stool POSITIVE (A) NEG    
GLUCOSE, POC Collection Time: 07/31/18  7:16 AM  
Result Value Ref Range Glucose (POC) 77 65 - 100 mg/dL Performed by Olive Sprout GLUCOSE, POC Collection Time: 07/31/18  9:08 AM  
Result Value Ref Range Glucose (POC) 93 65 - 100 mg/dL Performed by Baltazar Haas GLUCOSE, POC Collection Time: 07/31/18 11:11 AM  
Result Value Ref Range Glucose (POC) 131 (H) 65 - 100 mg/dL Performed by Olive Sprout GLUCOSE, POC Collection Time: 07/31/18  5:50 PM  
Result Value Ref Range Glucose (POC) 128 (H) 65 - 100 mg/dL Performed by Thom Fill IMAGING RESULTS: 
 [x]      I have personally reviewed the actual   [x]    CXR- pulmonary edema Recommendations/Plan: Active Problems: 
  SOB (shortness of breath) on exertion (7/27/2018) 
 
  
___________________________________________________ RECOMMENDATIONS:   
79yo M with guaiac positive dark stool while on ASA and Plavix, needed for his Afib and CAD. His anemia is stable on admission and prior investigation 2015 and 2016 note internal hemorrhoids and diverticulosis with unremarkable bx of esophagus and stomach. I am unable to determine if he was taking his OP PPI that is listed, but he has not had PPI here as IP. The dark loose stool here may have represented lower GI source or UGI source. Plan: 
1) Continue ASa and Plavix 2) Daily PPI should be continued 3) No plan for EGD or colonoscopy at this time. Discussed Code Status:    [x]    Full Code      []    DNR   
___________________________________________________ Care Plan discussed with: 
  [x]    Patient   []    Family   [x]    Nursing   []    Attending Total Time : 50  minutes 
 ___________________________________________________ GI: Karel Saunders MD

## 2018-07-31 NOTE — PROGRESS NOTES
1315: Pt received from [place]. Vital signs obtained. O2 sats mid-to-high 80s laying in bed on room air. O2 2L given and HOB elevated, O2 sat improved to 96%. 1345: Pt remains on 2L at this time. 1552: Dr. Zainab Murray paged regarding ASA and plavix being held this AM due to possible GI bleed. GI has been consulted and plan to see him this afternoon. Dr. Zainab Murray advised that the ASA and plavix be continued. 1620: Pt found laying supine, O2 sats in the low 80s on 2L NC. Pt was positioned with HOB straight up, O2 increased to 3L and sats increased to mid 90s. O2 decreased back to 2L with pts head still elevated. Sats remained in the mid 90s. [phone]: Spoke with daughter, Samy Butler. She would like to be called when discharge orders are received. 1900: [facility] [place], RN.

## 2018-07-31 NOTE — PROGRESS NOTES
Paged hospitalist on call Dr. Adry Galaviz for patient's dark loose stool. Update Order received to hold heparin.

## 2018-07-31 NOTE — ROUTINE PROCESS
TRANSFER - OUT REPORT: 
 
Verbal report given to Yeimy Montemayor RN(name) on Evon Torres  being transferred to [facility](unit) for routine progression of care Report consisted of patients Situation, Background, Assessment and  
Recommendations(SBAR). Information from the following report(s) Kardex, ED Summary and Procedure Summary was reviewed with the receiving nurse. Lines:  
Peripheral IV 07/30/18 Left Antecubital (Active) Site Assessment Clean, dry, & intact 7/31/2018  7:47 AM  
Phlebitis Assessment 0 7/31/2018  7:47 AM  
Infiltration Assessment 0 7/31/2018  7:47 AM  
Dressing Status Clean, dry, & intact 7/31/2018  7:47 AM  
Dressing Type Transparent 7/31/2018  7:47 AM  
Hub Color/Line Status Pink 7/31/2018  7:47 AM  
Action Taken Blood drawn 7/30/2018  3:35 AM  
  
 
Opportunity for questions and clarification was provided. Patient transported with: 
 Monitor Registered Nurse

## 2018-07-31 NOTE — PROCEDURES
[facility] PROCEDURE NOTE Sharif Becerra 
MR#: [id number] : 1931 ACCOUNT #: [de-identified] DATE OF SERVICE: 2018 PROCEDURE:  Pacemaker, atrial lead revision. SURGEON:  Hannah Medina MD 
 
INDICATION:  Loss of capture and function in the atrial lead in this dual chamber pacemaker placed approximately 2 weeks ago. The lead was found to have loss of capture on pacemaker interrogation and lead dislodgement was confirmed by chest x-ray. The original procedure was done on 2018. DESCRIPTION OF PROCEDURE:  The patient was brought to the cardiac catheterization lab in the fasting state and after informed consent was obtained. Electrocardiographic and hemodynamic monitoring were performed. Sedation was performed by the nurse under constant supervision of the attending physician from 3:15 p.m. until 4:36 p.m. Using 1% lidocaine with epinephrine, the left chest wall implant site was anesthetized at the site of the prior pacemaker implant. The pocket was opened in the usual fashion along the preexisting suture line. The atrial lead was disconnected from the pacemaker generator and the generator was removed from the pocket. The sutures attaching the sleeve of the atrial lead were also removed to allow for mobility of the lead. The atrial lead was a [employer] Tendril MRI, model FP3083H, 52 cm lead, serial number [id number]. Under fluoroscopic guidance, a curved stylet was placed in the lead and the lead was screwed into the area of the right atrial appendage. Testing parameters revealed a fibrillatory wave of 2.8 millivolts with an impedance of 460 ohms. The lead was screwed in place and the sleeve was secured with silk sutures to the pocket floor. The ventricular lead was also tested and revealed an R-wave of greater than 12 millivolts with an impedance of 540 ohms and pacing threshold of 1.0 volts and 0.4 milliseconds pulse width.   The pocket was then inspected and after hemostasis was obtained, vigorous irrigation with antibiotic solution was performed. The pocket was closed using 2 rows of running 2-0 Vicryl, followed by a more superficial layer of running 4-0 Vicryl in a subcuticular fashion. Final fluoroscopic check revealed adequate redundancy of the leads and absence of pneumothorax. PLAN:  The patient was to have a post-implant check on the day after the procedure and he was given IV vancomycin post-procedure. He was to have followup as an outpatient approximately 10 days post-procedure as well. MD Rafa Alexander 
D: 07/31/2018 16:48    
T: 07/31/2018 17:22 
JOB #: [id number]

## 2018-07-31 NOTE — ROUTINE PROCESS
Pt stayed in [facility] for echo to be completed and to finish lunch. He was just safely transferred to room 2212.

## 2018-07-31 NOTE — PROGRESS NOTES
Primary Nurse Armani Ulloa and Matias Lopez, RN performed a dual skin assessment on this patient No impairment noted Bharathi score is 19

## 2018-07-31 NOTE — CARDIO/PULMONARY
Cardiopulmonary Rehab: 
 
Chart reviewed. Pt is on the CHF bundle list. Pt is a 80 y.o. male admitted for lead revision. Pt visited. Laying in bed. Very [place]. Living with Heart Failure Booklet given to Shivani Arana. Educated using teach back method. Discussed diagnosis definition and assessed patient understanding. Reviewed importance of daily weight monitoring and Low Sodium diet (6724-8772 mg. daily). Encouraged activity and rest periods within symptom limitations and as ordered by physician. Reviewed , purpose of medication, potential side effects, compliance, and what to do if dose is missed. Discussed importance of reporting signs and symptoms of exacerbation, and when to report them to the doctor, to prevent re-hospitalization. Shivani Arana was encouraged to keep all appointments with doctor. Smoking history assessed. Pt reports his daughter and wife cooks and his daughter removed the salt from the house. Pt states he owns a functioning scale and weighs daily, normally around 140-142 lbs. Reviewed s&s of fluid overload. Reports his diuretic may need to be adjusted with he \"got too sick\". Encouraged him to share CHF book with his family. Pt indicated basic understanding.

## 2018-08-01 NOTE — PROGRESS NOTES
Problem: Falls - Risk of 
Goal: *Absence of Falls Document [facility] Fall Risk and appropriate interventions in the flowsheet. Outcome: Progressing Towards Goal 
Fall Risk Interventions: 
Mobility Interventions: Bed/chair exit alarm, Communicate number of staff needed for ambulation/transfer, Mechanical lift, OT consult for ADLs, Patient to call before getting OOB, PT Consult for mobility concerns Mentation Interventions: Bed/chair exit alarm, Door open when patient unattended, Evaluate medications/consider consulting pharmacy, Eyeglasses and hearing aids, Familiar objects from home, Family/sitter at bedside Medication Interventions: Bed/chair exit alarm, Evaluate medications/consider consulting pharmacy, Patient to call before getting OOB, Teach patient to arise slowly, Utilize gait belt for transfers/ambulation Elimination Interventions: Bed/chair exit alarm, Call light in reach, Elevated toilet seat, Patient to call for help with toileting needs, Toilet paper/wipes in reach, Toileting schedule/hourly rounds

## 2018-08-01 NOTE — PROGRESS NOTES
Problem: Self Care Deficits Care Plan (Adult) Goal: *Acute Goals and Plan of Care (Insert Text) Occupational Therapy Goals Initiated 8/1/2018 1. Patient will perform grooming at the sink with supervision/set-up within 7 day(s). 2.  Patient will perform bathing at the sink with moderate assistance  within 7 day(s). 3.  Patient will perform lower body dressing with moderate assistance  within 7 day(s). 4.  Patient will perform toilet transfers with minimal assistance/contact guard assist within 7 day(s). 5.  Patient will perform all aspects of toileting with supervision/set-up within 7 day(s). 6.  Patient will participate in upper extremity therapeutic exercise/activities with supervision/set-up for 5 minutes within 7 day(s). 7.  Patient will utilize energy conservation techniques during functional activities with verbal cues within 7 day(s). Occupational Therapy EVALUATION Patient: Mery Bishop (87 y.o. male) Date: 8/1/2018 Primary Diagnosis: SOB (shortness of breath) on exertion Precautions:   Fall (Pacemaker) ASSESSMENT : 
Based on the objective data described below, the patient presents with generalized weakness, decreased endurance, functional mobility, and ADLs, and appears to have decreased cognition. .  Pt was living with family and states that his wife is not well and he had other family members that come to assist. Pt was cleared to be seen for therapy and all VSS and pt was on  2 liters and his O2 was at 90 at rest.  Pt was CGA for supine to sit and once he was sitting he appeared SOB. His O2% was 87% while pt was sitting on EOb. He was put on 3 liters and his O2 increased to 90 and pt was able to stand with min assist of 2 and he was min assist of 2 for transfer to recliner. Pts O2% was 82% and pt was educated on PLB and he had difficulty with breathing in through his nose. After pt was sitting and resting his O2% increased to 93% on 3.5 liters, nursing notified. Recommend that pt have further therapy at discharge to rehab at [facility]. Patient will benefit from skilled intervention to address the above impairments. Patients rehabilitation potential is considered to be Fair Factors which may influence rehabilitation potential include:  
[x]             None noted []             Mental ability/status []             Medical condition []             Home/family situation and support systems []             Safety awareness []             Pain tolerance/management 
[]             Other: PLAN : 
Recommendations and Planned Interventions: 
[x]               Self Care Training                  []        Therapeutic Activities [x]               Functional Mobility Training    []        Cognitive Retraining 
[x]               Therapeutic Exercises           [x]        Endurance Activities []               Balance Training                   []        Neuromuscular Re-Education []               Visual/Perceptual Training     [x]   Home Safety Training 
[x]               Patient Education                 [x]        Family Training/Education []               Other (comment): Frequency/Duration: Patient will be followed by occupational therapy 4 times a week to address goals. Discharge Recommendations: Simon Vu Further Equipment Recommendations for Discharge: tbd SUBJECTIVE:  
Patient stated My wife is sick too . She cant help me .  OBJECTIVE DATA SUMMARY:  
HISTORY:  
Past Medical History:  
Diagnosis Date  Cancer ([facility] 2005  
 throat Cancer; laser surgery, no chemo, 30 radiation Tx, breast ca  Chronic kidney disease   
 stage IV:   Dr Albert Fonseca ([phone])  Chronic obstructive pulmonary disease ([facility]  Dysphagia 8/11/2015  GERD (gastroesophageal reflux disease)  Guillain-[facility]) 1990  
 as of 8/3/15 pt has aching/weakness in legs: Dr Reuben Holder History of heart attack 1981 Dr Gómez GAYTAN (hard of hearing) doesn't wear hearing aides  Hypercholesteremia  Hypertension  Personal history of colonic polyps 8/11/2015  Senile dementia Dr Shaggy Fonseca Past Surgical History:  
Procedure Laterality Date  BREAST SURGERY PROCEDURE UNLISTED    
 removed breast cancer  COLONOSCOPY N/A 11/10/2016 COLONOSCOPY performed by Evelyn Dawn MD at hospitals ENDOSCOPY  COLONOSCOPY,DIAGNOSTIC  8/11/2015  COLONOSCOPY,DIAGNOSTIC  11/10/2016  COLONOSCOPY,REMV LESN,SNARE  8/11/2015  HX APPENDECTOMY  HX OTHER SURGICAL  2005  
 throat cancer removal  
 HX OTHER SURGICAL  2005  
 radiation treatments Prior Level of Function/Environment/Context: pt lives with family and was independent with ADLs pt reports and if he did need help his family would be able to help him. It was difficult to get history from pt. Expanded or extensive additional review of patient history:  
 
Home Situation Home Environment: Private residence # Steps to Enter: 5 Rails to Enter: Yes One/Two Story Residence: One story Living Alone: No 
Support Systems: Spouse/Significant Other/Partner Patient Expects to be Discharged to[de-identified] Private residence Current DME Used/Available at Home: Crutches Hand dominance: Right EXAMINATION OF PERFORMANCE DEFICITS: 
Cognitive/Behavioral Status: 
Neurologic State: Alert Orientation Level: Oriented X4 Cognition: Follows commands Perception: Appears intact Perseveration: No perseveration noted Safety/Judgement: Fall prevention Skin: in good health Edema: none Hearing: Auditory Auditory Impairment: Hard of hearing, bilateral 
 
Vision/Perceptual:   
    
    
  intact Range of Motion: 
 
AROM: Generally decreased, functional 
  
  
  
  
  
  
  
 
Strength: 
 
Strength: Generally decreased, functional 
  
  
  
  
 
Coordination: 
Coordination: Generally decreased, functional 
Fine Motor Skills-Upper: Left Intact; Right Intact Gross Motor Skills-Upper: Left Intact; Right Intact Tone & Sensation: 
 
Tone: Normal 
  
  
  
  
  
  
  
 
Balance: 
Sitting: Intact Standing: Impaired; With support Standing - Static: Good Standing - Dynamic : Fair Functional Mobility and Transfers for ADLs: 
Bed Mobility: 
Rolling: Contact guard assistance Supine to Sit: Contact guard assistance Scooting: Contact guard assistance Transfers: 
Sit to Stand: Minimum assistance Stand to Sit: Minimum assistance Bed to Chair: Minimum assistance (with RW support) ADL Assessment: 
Feeding: Setup Oral Facial Hygiene/Grooming: Setup Bathing: Maximum assistance;Minimum assistance Upper Body Dressing: Minimum assistance;Contact guard assistance Lower Body Dressing: Maximum assistance;Minimum assistance Cognitive Retraining Safety/Judgement: Fall prevention Functional Measure: 
Barthel Index: 
 
Bathin Bladder: 0 Bowels: 5 Groomin Dressin Feeding: 10 Mobility: 0 Stairs: 0 Toilet Use: 5 Transfer (Bed to Chair and Back): 5 Total: 30 Barthel and G-code impairment scale: 
Percentage of impairment CH 
0% CI 
1-19% CJ 
20-39% CK 
40-59% CL 
60-79% CM 
80-99% CN 
100% Barthel Score 0-100 100 99-80 79-60 59-40 20-39 1-19 
 0 Barthel Score 0-20 20 17-19 13-16 9-12 5-8 1-4 0 The Barthel ADL Index: Guidelines 1. The index should be used as a record of what a patient does, not as a record of what a patient could do. 2. The main aim is to establish degree of independence from any help, physical or verbal, however minor and for whatever reason. 3. The need for supervision renders the patient not independent. 4. A patient's performance should be established using the best available evidence. Asking the patient, friends/relatives and nurses are the usual sources, but direct observation and common sense are also important. However direct testing is not needed.  
5. Usually the patient's performance over the preceding 24-48 hours is important, but occasionally longer periods will be relevant. 6. Middle categories imply that the patient supplies over 50 per cent of the effort. 7. Use of aids to be independent is allowed. Ubaldo Duel., Barthel, D.W. (1579). Functional evaluation: the Barthel Index. [facility] (14)2. VERONICA Serrano, Halley Napier., [facility], [address] [place] [address] (1999). Measuring the change indisability after inpatient rehabilitation; comparison of the responsiveness of the Barthel Index and Functional Tyler Measure. Journal of Neurology, Neurosurgery, and Psychiatry, 66(4), 045-292. Laura Ricci, N.J.A, LAWRENCE Quiros, & Charito Turner M.A. (2004.) Assessment of post-stroke quality of life in cost-effectiveness studies: The usefulness of the Barthel Index and the EuroQoL-5D. [facility], 13, 114-30 G codes: In compliance with CMSs Claims Based Outcome Reporting, the following G-code set was chosen for this patient based on their primary functional limitation being treated: The outcome measure chosen to determine the severity of the functional limitation was the Barthel with a score of 30/100 which was correlated with the impairment scale. ? Self Care:  
  - CURRENT STATUS: CK - 40%-59% impaired, limited or restricted  - GOAL STATUS: CJ - 20%-39% impaired, limited or restricted  - D/C STATUS:  ---------------To be determined--------------- Occupational Therapy Evaluation Charge Determination History Examination Decision-Making MEDIUM Complexity : Expanded review of history including physical, cognitive and psychosocial  history  MEDIUM Complexity : 3-5 performance deficits relating to physical, cognitive , or psychosocial skils that result in activity limitations and / or participation restrictions MEDIUM Complexity : Patient may present with comorbidities that affect occupational performnce.  Tamra Hutchinson to moderate modification of tasks or assistance (eg, physical or verbal ) with assesment(s) is necessary to enable patient to complete evaluation Based on the above components, the patient evaluation is determined to be of the following complexity level: MEDIUM Pain: 
Pain Scale 1: Numeric (0 - 10) Pain Intensity 1: 0 Activity Tolerance:  
vss/fair Please refer to the flowsheet for vital signs taken during this treatment. After treatment:  
[x] Patient left in no apparent distress sitting up in chair 
[] Patient left in no apparent distress in bed 
[x] Call bell left within reach [x] Nursing notified 
[] Caregiver present [x] Bed alarm activated COMMUNICATION/EDUCATION:  
The patients plan of care was discussed with: Physical Therapist and Registered Nurse. [x] Home safety education was provided and the patient/caregiver indicated understanding. [x] Patient/family have participated as able in goal setting and plan of care. [] Patient/family agree to work toward stated goals and plan of care. [] Patient understands intent and goals of therapy, but is neutral about his/her participation. [x] Patient is unable to participate in goal setting and plan of care. This patients plan of care is appropriate for delegation to [facility]. Thank you for this referral. 
Abimbola Burgess OT Time Calculation: 31 mins

## 2018-08-01 NOTE — PROGRESS NOTES
Hospitalist Progress Note NAME: Emily Sandoval :  1931 MRN:  [id number] Interim Hospital Summary: 80 y.o. male whom presented on 2018 with Assessment / Plan: 
80 y.o.  male with pmh of HTN, CAD, HDL, recent PPM, COPD, CKD and dementia who presented to ED with SOB. CXR revealed increased interstitial and parenchymal pulmonary edema. Admitted with diagnosis of CHF vs PNA. She completed a 5-day course of levofloxacin and received IV lasix with clinical improvement. SOB Pulmonary edema CAP, bacterial 
CXR revealed increased interstitial and parenchymal pulmonary edema. VQ scan very low probability  
echo: LVEF 40 %. Mild diffuse hypokinesis. Clinical improvement. Change lasix to IV. continue fluid restriction, daily weights. completed 5 days of Levofloxacin. Recurrent Afib/flutter S/P PPM 
Misplaced Atrial lead HR is controlled at rest and increased up to 120s while transferring to his chair. Continue lopressor and amiodarone. Close monitor. Cardiology is following. Atrial lead revision done by Dr. Jese Reilly on . Patient candidate for anticoagulation but will hold for now due to GI bleed. Melena/hematochezia Patient with episode of melena/hematochezia , no further episodes since then. GI consulted, no procedure indicated at this time. Continue PPI. CAD Stable. Continue Aspirin, Plavix and atorvastatin. COPD 
- not in acute exacerbation.  Continue with Arnuity ellipta and duoneb. HTN 
BP is adequate. CKD4 Anemia, likely from renal failure At baseline. Creat 2.8. Close monitor.  
  
Hx TIA 
- on plavix 
   
Hx throat cancer 
   
Dementia  
- continue aricept 
   
Depression / anxiety 
- continue zoloft 
  
Debility PT/OT consulted. SNF at discharge. 
  
18.5 - 24.9 Normal weight / Body mass index is 23.56 kg/(m^2).    
Code status: Full Prophylaxis: Lovenox Recommended Disposition: TBD; to be evaluated by PT/OT 
  
 Subjective: Chief Complaint / Reason for Physician Visit \"Patient reports difficulty in breathing when transferring from his bed to the chair. Mild diffuse chest pain in upper chest. No nausea. No vomiting. \". Discussed with RN events overnight. Review of Systems: 
Symptom Y/N Comments  Symptom Y/N Comments Fever/Chills n   Chest Pain n   
Poor Appetite    Edema n   
Cough n   Abdominal Pain n   
Sputum    Joint Pain SOB/REYEZ n   Pruritis/Rash Nausea/vomit n   Tolerating PT/OT n Diarrhea n   Tolerating Diet y Constipation    Other Could NOT obtain due to:   
 
Objective: VITALS:  
Last 24hrs VS reviewed since prior progress note. Most recent are: 
Patient Vitals for the past 24 hrs: 
 Temp Pulse Resp BP SpO2  
08/01/18 0733 98.4 °F (36.9 °C) (!) 102 20 161/89 98 % 08/01/18 0347 96.3 °F (35.7 °C) (!) 104 20 147/87 96 % 08/01/18 0007 98.4 °F (36.9 °C) (!) 104 20 129/70 93 % 07/31/18 1956 98.5 °F (36.9 °C) (!) 102 18 153/71 94 % 07/31/18 1449 97.8 °F (36.6 °C) 100 18 119/77 96 % 07/31/18 1320 - - - - 96 % 07/31/18 1317 98.3 °F (36.8 °C) 99 20 132/76 91 %  
07/31/18 1010 98 °F (36.7 °C) (!) 111 18 120/88 90 % Intake/Output Summary (Last 24 hours) at 08/01/18 1007 Last data filed at 08/01/18 0310 Gross per 24 hour Intake              360 ml Output             1050 ml Net             -690 ml PHYSICAL EXAM: 
General: Alert, cooperative, not in acute pain or distress   
EENT:  Anicteric sclerae. very [place]. Dry nares. Resp:  Coarse breath sounds throughout, no wheezing or rales. CV:  irregular  rate,  No edema GI:  Soft, Non distended, Non tender.  +Bowel sounds Neurologic:  Alert and oriented X 3, normal speech, Psych:   Fair insight. Not anxious nor agitated Skin:  No rashes. No jaundice Reviewed most current lab test results and cultures  YES Reviewed most current radiology test results   YES Review and summation of old records today NO 
Reviewed patient's current orders and MAR    YES 
PMH/SH reviewed - no change compared to H&P 
______________________________________________________________ 
______________________________________________________________ Flor Buenrostro MD  
 
Procedures: see electronic medical records for all procedures/Xrays and details which were not copied into this note but were reviewed prior to creation of Plan. LABS: 
I reviewed today's most current labs and imaging studies. Pertinent labs include: 
Recent Labs 08/01/18 0114 07/31/18 0245  07/30/18 
 0335 WBC  12.2*  11.0  13.8* HGB  9.9*  10.1*  9.8* HCT  34.5*  35.1*  34.6*  
PLT  329  335  361 Recent Labs 08/01/18 0114 07/31/18 0245  07/30/18 
 0335 NA  145  145  144  
K  3.9  4.3  5.0  
CL  112*  112*  112* CO2  27  25  22 GLU  90  79  92 BUN  38*  38*  36* CREA  2.68*  2.86*  2.94* CA  9.1  9.1  9.0 Signed: )Flor Buenrostro MD

## 2018-08-01 NOTE — PROGRESS NOTES
Bedside shift change report given to EDNA Ni (oncoming nurse). Report included the following information SBAR, Kardex, Intake/Output, MAR and Recent Results. SHIFT SUMMARY: 
 
 
 
 
 
[address] NURSING NOTE Admission Date 7/27/2018 Admission Diagnosis SOB (shortness of breath) on exertion Consults IP CONSULT TO GASTROENTEROLOGY Cardiac Monitoring [x] Yes [] No  
  
Purposeful Hourly Rounding [x] Yes   
Bony Score Total Score: 3 Bony score 3 or > [x] Bed Alarm [] Avasys [] 1:1 sitter [] Patient refused (Signed refusal form in chart) Bharathi Score Bharathi Score: 19 Bharathi score 14 or < [] PMT consult [] Wound Care consult  
 []  Specialty bed  [] Nutrition consult Influenza Vaccine Received Flu Vaccine for Current Season (usually Sept-March): Not Flu Season Oxygen needs? [] Room air Oxygen @  []1L    [x]2L    []3L   []4L    []5L   []6L via NC Chronic home O2 use? [] Yes [x] No 
Perform O2 challenge test and document in progress note using [employer] [employer]Homeoxygen) Last bowel movement Last Bowel Movement Date: 07/31/18 Urinary Catheter [REMOVED] Urinary Catheter 07/30/18-Indications for Use: Surgery Condom Catheter 07/30/18-Indications for Use: Accurate measurement of urinary output Condom Catheter 07/30/18-Urine Output (mL): 350 ml LDAs Peripheral IV 07/30/18 Left Antecubital (Active) Site Assessment Clean, dry, & intact 7/31/2018  7:59 PM  
Phlebitis Assessment 0 7/31/2018  7:59 PM  
Infiltration Assessment 0 7/31/2018  7:59 PM  
Dressing Status Clean, dry, & intact 7/31/2018  7:59 PM  
Dressing Type Transparent 7/31/2018  7:59 PM  
Hub Color/Line Status Pink;Capped 7/31/2018  7:59 PM  
Action Taken Blood drawn 7/30/2018  3:35 AM  
      
Condom Catheter 07/30/18 (Active) Indications for Use Accurate measurement of urinary output 7/31/2018  7:59 PM  
Status Draining 7/31/2018  7:59 PM  
Site Condition No abnormalities 7/31/2018  7:59 PM Drainage Tube Clipped to Bed No 7/31/2018  7:59 PM  
Catheter Secured to Thigh No 7/31/2018  7:59 PM  
Tamper Seal Intact No 7/31/2018  7:59 PM  
Bag Below Bladder/Not on Floor Yes 7/31/2018  7:59 PM  
Lack of Dependent Loop in Tubing Yes 7/31/2018  7:59 PM  
Drainage Bag Less Than Half Full Yes 7/31/2018  7:59 PM  
Urine Output (mL) 350 ml 7/31/2018  2:27 AM  
            
  
Readmission Risk Assessment Tool Score Medium Risk   
      
 19 Total Score 3 Has Seen PCP in Last 6 Months (Yes=3, No=0) 2 . Living with Significant Other. Assisted Living. LTAC. SNF. or  
Rehab  
 4 IP Visits Last 12 Months (1-3=4, 4=9, >4=11) 5 Pt. Coverage (Medicare=5 , Medicaid, or Self-Pay=4) 5 Charlson Comorbidity Score (Age + Comorbid Conditions) Criteria that do not apply:  
 Patient Length of Stay (>5 days = 3) Expected Length of Stay 3d 7h Actual Length of Stay 4

## 2018-08-01 NOTE — PROGRESS NOTES
GI Progress Note Eloisa Munoz Baldev Baez APO:[birth year] [id number] ATTG: DANIELLE Paz MD  PCP: Jeanie Acevedo MD 
Date/Time:  8/1/2018 4:27 PM  
Assessment:  
· Guaiac positive stool · Anemia- stable · GIB- no recurrence Plan:  
· Continue ASA and Plavix · Daily PPI · No plan for EGD or colonosocpy Subjective:  
Discussed with RN events overnight. No recurrence of bleeding noted Complaint Y/N Description Abdominal Pain n Hematemesis n Hematochezia n Melena n   
Constipation n   
Diarrhea n Dyspepsia n Dysphagia n   
Jaundiced n   
Nausea/vomiting n Review of Systems: 
Symptom Y/N Comments  Symptom Y/N Comments Fever/Chills n   Chest Pain n   
Cough n   Headaches n Sputum n   Joint Pain n   
SOB/REYEZ n   Pruritis/Rash n Tolerating Diet y   Other Could NOT obtain due to:   
 
Objective: VITALS:  
Last 24hrs VS reviewed since prior progress note. Most recent are: 
Visit Vitals  /81 (BP 1 Location: Left arm, BP Patient Position: At rest)  Pulse (!) 107  Temp 98.5 °F (36.9 °C)  Resp 18  Ht 5' 6\" (1.676 m)  Wt 66.3 kg (146 lb 3 oz)  SpO2 95%  BMI 23.6 kg/m2 Intake/Output Summary (Last 24 hours) at 08/01/18 1627 Last data filed at 08/01/18 1417 Gross per 24 hour Intake              360 ml Output             1750 ml Net            -1390 ml PHYSICAL EXAM: 
General: WD, WN. Alert, cooperative, no acute distress   
HEENT: NC, Atraumatic. PERRL. Anicteric sclerae. Lungs:  CTA Bilaterally. No Wheezing/Rhonchi/Rales. Heart:  Irr Irr,  No murmur (), No Rub/Gallops Abdomen: Soft, Non distended, Non tender.  +BS Extremities: No c/c/e Neurologic:  CN 2-12 gi, A/O X 3. No acute neurological distress Psych:   Good insight. Not anxious nor agitated. Lab and Radiology Data Reviewed: (see below) Medications Reviewed: (see below) PMH/SH reviewed - no change compared to H&P 
________________________________________________________________________ Total time spent with patient: 15 minutes ________________________________________________________________________ Care Plan discussed with: 
Patient y Family RN y  
           Consultant:    
 
Alin Teague MD  
 
Procedures: see electronic medical records for all procedures/Xrays and details which were not copied into this note but were reviewed prior to creation of Plan. LABS: 
Recent Labs 08/01/18 0114 07/31/18 
 0245 WBC  12.2*  11.0 HGB  9.9*  10.1* HCT  34.5*  35.1*  
PLT  329  335 Recent Labs 08/01/18 0114 07/31/18 0245 07/30/18 
 0335 NA  145  145  144  
K  3.9  4.3  5.0  
CL  112*  112*  112* CO2  27  25  22 BUN  38*  38*  36* CREA  2.68*  2.86*  2.94* GLU  90  79  92 CA  9.1  9.1  9.0 No results for input(s): SGOT, GPT, AP, TBIL, TP, ALB, GLOB, GGT, AML, LPSE in the last 72 hours. No lab exists for component: AMYP, HLPSE No results for input(s): INR, PTP, APTT in the last 72 hours. No lab exists for component: INREXT No results for input(s): FE, TIBC, PSAT, FERR in the last 72 hours. No results found for: FOL, RBCF No results for input(s): PH, PCO2, PO2 in the last 72 hours. No results for input(s): CPK, CKMB in the last 72 hours. No lab exists for component: TROPONINI No results found for: COLOR, APPRN, SPGRU, REFSG, BECKIE, PROTU, GLUCU, KETU, BILU, UROU, LYDIA, LEUKU, GLUKE, EPSU, BACTU, WBCU, RBCU, CASTS, UCRY MEDICATIONS: 
Current Facility-Administered Medications Medication Dose Route Frequency  furosemide (LASIX) tablet 40 mg  40 mg Oral BID  pantoprazole (PROTONIX) tablet 40 mg  40 mg Oral ACB  sodium chloride (NS) flush 5-10 mL  5-10 mL IntraVENous Q8H  
 sodium chloride (NS) flush 5-10 mL  5-10 mL IntraVENous PRN  
 acetaminophen (TYLENOL) tablet 650 mg  650 mg Oral Q4H PRN  
 HYDROcodone-acetaminophen (NORCO) 5-325 mg per tablet 1 Tab  1 Tab Oral Q4H PRN  
 albuterol-ipratropium (DUO-NEB) 2.5 MG-0.5 MG/3 ML  3 mL Nebulization Q4H PRN  
 albuterol (PROVENTIL HFA, VENTOLIN HFA, PROAIR HFA) inhaler 2 Puff  2 Puff Inhalation Q6H PRN  
 amiodarone (CORDARONE) tablet 200 mg  200 mg Oral BID  fluticasone furoate (ARNUITY ELLIPTA) 100 mcg/puff  1 Puff Inhalation DAILY  finasteride (PROSCAR) tablet 5 mg  5 mg Oral DAILY  sertraline (ZOLOFT) tablet 25 mg  25 mg Oral QHS  metoprolol tartrate (LOPRESSOR) tablet 50 mg  50 mg Oral BID  
 donepezil (ARICEPT) tablet 10 mg  10 mg Oral QHS  ferrous sulfate tablet 325 mg  1 Tab Oral DAILY WITH BREAKFAST  clopidogrel (PLAVIX) tablet 75 mg  75 mg Oral DAILY  aspirin chewable tablet 81 mg  81 mg Oral DAILY  atorvastatin (LIPITOR) tablet 20 mg  20 mg Oral DAILY  ondansetron (ZOFRAN) injection 4 mg  4 mg IntraVENous Q4H PRN

## 2018-08-01 NOTE — PROGRESS NOTES
Cardiology Progress Note 8/1/2018 11:43 AM 
 
Admit Date: 7/27/2018 Subjective: Feels well. No new c/o. No further GI bleedng Visit Vitals  /69 (BP 1 Location: Left arm, BP Patient Position: At rest)  Pulse (!) 107  Temp 98.2 °F (36.8 °C)  Resp 22  
 Ht 5' 6\" (1.676 m)  Wt 66.3 kg (146 lb 3 oz)  SpO2 95%  BMI 23.6 kg/m2  
 
07/30 1901 - 08/01 0700 In: 1210 [P.O.:960; I.V.:250] Out: 1800 [Urine:1800] Objective:  
  
Physical Exam: 
VS as above Chest scattered carckles Card Irreg irreg no changes Data Review:  
Labs:   
Hgb 9.9 BUN 38 Creat 2.7 K 3.9 Telemetry: afib R  Assessment: 1.  Shortness of breath with documented hypoxia, unclear cause. Pneumonia vs CHF 2.  Status post recent dual chamber pacemaker placement. S/p atrial lead revision 3.  Coronary artery disease with remote non-ST elevation myocardial infarction. 4.  History of paroxysmal atrial fibrillation and sick sinus syndrome with recent pacemaker placement. Recurrent afib/ flutter 5.  Pulmonary hypertension with right heart dysfunction of uncertain cause. 6.  Mild dementia. 7.  Chronic renal insufficiency. 8.  Chronic anemia. 9.  Prior CVA. 10.  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 11. Recent melena and hematochezia Plan: Will rate control afib. Check CXR. No anticoag due to GI bleeding

## 2018-08-01 NOTE — PROGRESS NOTES
Problem: Mobility Impaired (Adult and Pediatric) Goal: *Acute Goals and Plan of Care (Insert Text) Physical Therapy Goals Initiated 8/1/2018 1. Patient will move from supine to sit and sit to supine , scoot up and down and roll side to side in bed with independence within 7 day(s). 2.  Patient will transfer from bed to chair and chair to bed with modified independence using the least restrictive device within 7 day(s). 3.  Patient will perform sit to stand with modified independence within 7 day(s). 4.  Patient will ambulate with supervision/set-up for 200 feet with the least restrictive device within 7 day(s). 5.  Patient will ascend/descend 5 stairs with 1 handrail(s) with supervision/set-up within 7 day(s). physical Therapy EVALUATION Patient: Destin Martinez (76 y.o. male) Date: 8/1/2018 Primary Diagnosis: SOB (shortness of breath) on exertion Precautions:  Fall (Pacemaker) ASSESSMENT : 
Based on the objective data described below, the patient presents with impaired functional mobility secondary to impaired standing balance, impaired gait mechanics, generalized weakness, increased pain, decreased respiratory status, decreased ROM, impaired coordination, and decreased activity tolerance following admission for SOB. Pt received supine in bed and agreeable to PT evaluation with encouragement. Pt cleared by nursing for mobility. Pt on 2 L/min O2 (does not wear O2 at baseline) with VSS. No pain complaints. Educated pt on pacemaker precautions as pt with atrial lead revision on 7/30/18. Bed mobility performed with CGA and transfers performed with min A x 1-2 and use of RW. Pt with fair adherence to precautions overall, needing cueing to improve. Encouraged pt to attempt to ambulate in room, however pt declined to progress further than bed>chair. SaO2 86-87% on 2 L/min O2 with initial mobility, therefore O2 increased to 3 L/min O2, however 82% post-bed>chair transfer.  Encouraged and educated pt on PLB, however pt with fair technique. SaO2 >90% after resting x ~ 3 minutes in chair, however HR remained 110-120s bpm. Pt was left sitting in bedside chair with all needs met, RN and physician present, and chair alarm on following therapy session. Recommend pt discharge to SNF rehab to improve functional mobility and independence prior to returning home. PT will continue to follow to address mobility impairments as noted above. Recommend with nursing patient to complete as able in order to maintain strength, endurance and independence: OOB to chair 3x/day with min A x 2 assist. Thank you for your assistance. Patient will benefit from skilled intervention to address the above impairments. Patients rehabilitation potential is considered to be Fair Factors which may influence rehabilitation potential include:  
[]         None noted 
[x]         Mental ability/status [x]         Medical condition 
[x]         Home/family situation and support systems 
[x]         Safety awareness [x]         Pain tolerance/management 
[]         Other: PLAN : 
Recommendations and Planned Interventions: 
[x]           Bed Mobility Training             []    Neuromuscular Re-Education 
[x]           Transfer Training                   []    Orthotic/Prosthetic Training 
[x]           Gait Training                         []    Modalities [x]           Therapeutic Exercises           []    Edema Management/Control 
[x]           Therapeutic Activities            [x]    Patient and Family Training/Education 
[]           Other (comment): Frequency/Duration: Patient will be followed by physical therapy  4 times a week to address goals. Discharge Recommendations: Simon Vu Further Equipment Recommendations for Discharge: TBD by rehab SUBJECTIVE:  
Patient stated I have back problems.  OBJECTIVE DATA SUMMARY:  
HISTORY:   
Past Medical History:  
Diagnosis Date  Cancer ([facility] [address] 2005  
 throat Cancer; laser surgery, no chemo, 30 radiation Tx, breast ca  Chronic kidney disease   
 stage IV:   Dr Candance Ceo ([phone])  Chronic obstructive pulmonary disease ([facility] [address]  Dysphagia 8/11/2015  GERD (gastroesophageal reflux disease)  [facility]  
 as of 8/3/15 pt has aching/weakness in legs: Dr Eddie Gastelum History of heart attack 1981 Dr Deisy GAYTAN (hard of hearing) doesn't wear hearing aides  Hypercholesteremia  Hypertension  Personal history of colonic polyps 8/11/2015  Senile dementia Dr Jeremy Cox Past Surgical History:  
Procedure Laterality Date  BREAST SURGERY PROCEDURE UNLISTED    
 removed breast cancer  COLONOSCOPY N/A 11/10/2016 COLONOSCOPY performed by Presley Wynne MD at \[facility]\"" ENDOSCOPY  COLONOSCOPY,DIAGNOSTIC  8/11/2015  COLONOSCOPY,DIAGNOSTIC  11/10/2016  COLONOSCOPY,REMV LESN,SNARE  8/11/2015  HX APPENDECTOMY  HX OTHER SURGICAL  2005  
 throat cancer removal  
 HX OTHER SURGICAL  2005  
 radiation treatments Prior Level of Function/Home Situation: Pt is a poor historian, however reports that he uses one forearm crutch on R side for ambulation and lives with wife who he reports is not in good health. He notes that his children assist as needed as well. Pacemaker placed ~ 2 weeks ago. Personal factors and/or comorbidities impacting plan of care: GBS; dementia Home Situation Home Environment: Private residence # Steps to Enter: 5 Rails to Enter: Yes One/Two Story Residence: One story Living Alone: No 
Support Systems: Spouse/Significant Other/Partner Patient Expects to be Discharged to[de-identified] Private residence Current DME Used/Available at Home: Crutches EXAMINATION/PRESENTATION/DECISION MAKING:  
Critical Behavior: 
Neurologic State: Confused Cognition: Decreased command following, Decreased attention/concentration Hearing: Auditory Auditory Impairment: Hard of hearing, bilateral 
Skin:  L chest incision from pacemaker procedure Edema: None Range Of Motion: 
AROM: Generally decreased, functional 
  
  
  
  
  
  
  
Strength:   
Strength: Generally decreased, functional 
  
  
  
  
  
  
Tone & Sensation:  
Tone: Normal 
  
  
  
  
  
  
  
  
   
Coordination: 
Coordination: Generally decreased, functional 
Vision:  
  
Functional Mobility: 
Bed Mobility: 
Rolling: Contact guard assistance Supine to Sit: Contact guard assistance Scooting: Contact guard assistance Transfers: 
Sit to Stand: Minimum assistance Stand to Sit: Minimum assistance Bed to Chair: Minimum assistance (with RW support) Balance:  
Sitting: Intact Standing: Impaired; With support Standing - Static: Good Standing - Dynamic : Fair Ambulation/Gait Training: 
Distance (ft): 3 Feet (ft) (bed>chair) Assistive Device: Gait belt;Walker, rolling Ambulation - Level of Assistance: Minimal assistance; Additional time; Adaptive equipment Gait Description (WDL): Exceptions to [facility] Gait Abnormalities: Decreased step clearance; Step to gait Base of Support: Narrowed Speed/Delia: Pace decreased (<100 feet/min); Shuffled Step Length: Left shortened;Right shortened Functional Measure: 
Barthel Index: 
 
Bathin Bladder: 0 Bowels: 5 Groomin Dressin Feeding: 10 Mobility: 0 Stairs: 0 Toilet Use: 5 Transfer (Bed to Chair and Back): 5 Total: 30 Barthel and G-code impairment scale: 
Percentage of impairment CH 
0% CI 
1-19% CJ 
20-39% CK 
40-59% CL 
60-79% CM 
80-99% CN 
100% Barthel Score 0-100 100 99-80 79-60 59-40 20-39 1-19 
 0 Barthel Score 0-20 20 17-19 13-16 9-12 5-8 1-4 0 The Barthel ADL Index: Guidelines 1. The index should be used as a record of what a patient does, not as a record of what a patient could do.  
2. The main aim is to establish degree of independence from any help, physical or verbal, however minor and for whatever reason. 3. The need for supervision renders the patient not independent. 4. A patient's performance should be established using the best available evidence. Asking the patient, friends/relatives and nurses are the usual sources, but direct observation and common sense are also important. However direct testing is not needed. 5. Usually the patient's performance over the preceding 24-48 hours is important, but occasionally longer periods will be relevant. 6. Middle categories imply that the patient supplies over 50 per cent of the effort. 7. Use of aids to be independent is allowed. Mann Schultz., Barthel, D.W. (2254). Functional evaluation: the Barthel Index. [address] [facility] (14)2. Porfirio Steel radha VERONICA Church, Oanh Nicolas., Vida Emanuel., [place], [address] (1999). Measuring the change indisability after inpatient rehabilitation; comparison of the responsiveness of the Barthel Index and Functional [place] Measure. Journal of Neurology, Neurosurgery, and Psychiatry, 66(4), 324-377. Mile Macias, MARVIN, LAWRENCE Quiros, & Chantell Zhu MKellyA. (2004.) Assessment of post-stroke quality of life in cost-effectiveness studies: The usefulness of the Barthel Index and the EuroQoL-5D. [facility], 13, 289-73 G codes: In compliance with CMSs Claims Based Outcome Reporting, the following G-code set was chosen for this patient based on their primary functional limitation being treated: The outcome measure chosen to determine the severity of the functional limitation was the barthel index with a score of 30/100 which was correlated with the impairment scale. ? Mobility - Walking and Moving Around:  
  - CURRENT STATUS: CL - 60%-79% impaired, limited or restricted  - GOAL STATUS: CJ - 20%-39% impaired, limited or restricted  - D/C STATUS:  ---------------To be determined--------------- Physical Therapy Evaluation Charge Determination History Examination Presentation Decision-Making MEDIUM  Complexity : 1-2 comorbidities / personal factors will impact the outcome/ POC  HIGH Complexity : 4+ Standardized tests and measures addressing body structure, function, activity limitation and / or participation in recreation  MEDIUM Complexity : Evolving with changing characteristics  Other outcome measures barthel index  HIGH Based on the above components, the patient evaluation is determined to be of the following complexity level: MEDIUM Pain: 
Pain Scale 1: Numeric (0 - 10) Pain Intensity 1: 0 Activity Tolerance:  
Poor - SaO2 82-93% on 2-3 L/min O2; -120s bpm; increased SOB with activity; reported pain in mid to L side of chest with activity, however did not rate pain Please refer to the flowsheet for vital signs taken during this treatment. After treatment:  
[x]         Patient left in no apparent distress sitting up in chair 
[]         Patient left in no apparent distress in bed 
[x]         Call bell left within reach [x]         Nursing notified 
[x]         Caregiver present [x]         Bed alarm activated COMMUNICATION/EDUCATION:  
The patients plan of care was discussed with: Physical Therapist, Occupational Therapist, Registered Nurse and Physician. [x]         Fall prevention education was provided and the patient/caregiver indicated understanding. [x]         Patient/family have participated as able in goal setting and plan of care. [x]         Patient/family agree to work toward stated goals and plan of care. []         Patient understands intent and goals of therapy, but is neutral about his/her participation. []         Patient is unable to participate in goal setting and plan of care. Thank you for this referral. 
Adriana Ny, PT, DPT Time Calculation: 37 mins

## 2018-08-01 NOTE — PROGRESS NOTES
1910-Bedside shift change report given to EDNA Casillas  by Víctor Olivas. Report included the following information SBAR and Kardex. 1942-MEWS of 3, due to HR. MD aware, Charge RN made aware. Pt. Asymptomatic, will continue to monitor.

## 2018-08-02 NOTE — PROGRESS NOTES
Hospitalist Progress Note NAME: Ramirez Cai :  1931 MRN:  [id number] Assessment / Plan: 
80 y.o.   male with pmh of HTN, CAD, HDL, recent PPM, COPD, CKD and dementia who presented to ED with SOB. CXR revealed increased interstitial and parenchymal pulmonary edema. Admitted with diagnosis of CHF vs PNA. She completed a 5-day course of levofloxacin and received IV lasix with clinical improvement.  
  
SOB Pulmonary edema CAP, bacterial 
CXR revealed increased interstitial and parenchymal pulmonary edema. VQ scan very low probability  
echo: LVEF 40 %. Mild diffuse hypokinesis. Clinical improvement. Changed lasix to po. continue fluid restriction, daily weights. completed 5 days of Levofloxacin. CXR- no change 
recheck cbc in am to assure leukocytosis improving Still on o2, try to wean  
  
Recurrent Afib/flutter S/P PPM 
Misplaced Atrial lead HR is controlled at rest and increased up to 120s while transferring to his chair. Continue lopressor and amiodarone. Close monitor. Cardiology is following. Atrial lead revision done by Dr. Carla Summers on . Patient candidate for anticoagulation but will hold for now due to GI bleed. Still tachycardia, await futher recs from cards today 
  
Melena/hematochezia Patient with episode of melena/hematochezia , no further episodes since then. GI consulted, no procedure indicated at this time. Continue PPI.  
  
CAD Stable. Continue Aspirin, Plavix and atorvastatin.  
  
COPD 
- not in acute exacerbation.  Continue with Arnuity ellipta and duoneb.  
  
HTN 
BP is adequate.  
  
CKD4 Anemia, likely from renal failure At baseline. Creat 2.8. Close monitor.  
   
Hx TIA 
- on plavix 
   
Hx throat cancer 
   
Dementia  
- continue aricept 
   
Depression / anxiety 
- continue zoloft 
   
Debility PT/OT consulted. SNF at discharge. 
  
18.5 - 24.9 Normal weight / Body mass index is 23.56 kg/(m^2).  
   
Code status: Full 
Prophylaxis: Lovenox on hold with gi bleed Recommended Disposition: recs for snf. ..when ready Subjective: Chief Complaint / Reason for Physician Visit 
sob Patient denies any shortness of breath chest pain currently on nasal cannula denies any cough or sputum production. He is asking about possibly going home as he will not rehab. Patient was evaluated at 9:45 AM 
 
 
Discussed with RN events overnight. Review of Systems: 
Symptom Y/N Comments  Symptom Y/N Comments Fever/Chills    Chest Pain Poor Appetite    Edema Cough    Abdominal Pain Sputum    Joint Pain SOB/REYEZ    Pruritis/Rash Nausea/vomit    Tolerating PT/OT Diarrhea    Tolerating Diet Constipation    Other Could NOT obtain due to: confusion Objective: VITALS:  
Last 24hrs VS reviewed since prior progress note. Most recent are: 
Patient Vitals for the past 24 hrs: 
 Temp Pulse Resp BP SpO2  
08/02/18 1232 - 98 - - -  
08/02/18 1215 98.4 °F (36.9 °C) (!) 122 24 134/90 92 % 08/02/18 0800 98.7 °F (37.1 °C) (!) 112 24 161/86 95 % 08/02/18 0227 98.5 °F (36.9 °C) (!) 103 24 (!) 151/92 96 % 08/01/18 2353 98.5 °F (36.9 °C) 98 28 140/87 93 % 08/01/18 2202 - - - - 92 % 08/01/18 1942 98.7 °F (37.1 °C) (!) 102 22 126/76 95 % 08/01/18 1507 98.5 °F (36.9 °C) - 18 126/81 - Intake/Output Summary (Last 24 hours) at 08/02/18 1306 Last data filed at 08/02/18 5169 Gross per 24 hour Intake             1050 ml Output             1675 ml Net             -625 ml PHYSICAL EXAM: 
Patient is a frail man oriented to self and hospital had trouble with the month and the year is on nasal cannula but no distress. Conjunctiva pink oropharynx mucous membranes moist. Cardiovascular sounds regular on exam in the 120s no obvious murmurs rubs gallops. Lungs bilateral rhonchi diffusely no crackles or wheezes.  Abdomen is benign bowel sounds present soft nontender has a condom cath extremities no clubbing cyanosis Reviewed most current lab test results and cultures  YES Reviewed most current radiology test results   YES Review and summation of old records today    NO Reviewed patient's current orders and MAR    YES 
PMH/SH reviewed - no change compared to H&P 
________________________________________________________________________ Care Plan discussed with: 
  Comments Patient y Family RN y   
Care Manager y Consultant Multidiciplinary team rounds were held today with , nursing, pharmacist and clinical coordinator. Patient's plan of care was discussed; medications were reviewed and discharge planning was addressed. ________________________________________________________________________ Total NON critical care TIME:   Minutes Total CRITICAL CARE TIME Spent:   Minutes non procedure based Comments >50% of visit spent in counseling and coordination of care    
________________________________________________________________________ Jelly Perez MD  
 
Procedures: see electronic medical records for all procedures/Xrays and details which were not copied into this note but were reviewed prior to creation of Plan. LABS: 
I reviewed today's most current labs and imaging studies. Pertinent labs include: 
Recent Labs 08/02/18 
 3687  08/01/18 
 0114  07/31/18 
 0245 WBC   --   12.2*  11.0 HGB  10.2*  9.9*  10.1* HCT  36.0*  34.5*  35.1*  
PLT   --   329  335 Recent Labs 08/02/18 
 1132  08/01/18 
 0114  07/31/18 
 0245 NA   --   145  145  
K  3.7  3.9  4.3 CL   --   112*  112* CO2   --   27  25 GLU   --   90  79 BUN   --   38*  38* CREA   --   2.68*  2.86* CA   --   9.1  9.1 MG  2.1   --    --   
 
 
Signed: Jelly Perez MD

## 2018-08-02 NOTE — PROGRESS NOTES
Reason for Admission:   SOB 
            
RRAT Score:     22 Resources/supports as identified by patient/family:   Pt reports that his children, grandchildren and wife are supports. Pt reports that his daughter Brittany Mullen [phone]) typically provides transportation to his appointments. Top Challenges facing patient (as identified by patient/family and CM): Finances/Medication cost?      No issues reported at this time. Transportation? No issues reported at this time. Support system or lack thereof? No issues reported at this time. Living arrangements? Pt lives in a 1 story home with wife. Self-care/ADLs/Cognition? Pt reports to be independent in managing ADLs not including driving. Pt stated that he has not driven since 8397. Current Advanced Directive/Advance Care Plan:  Not on file. Pt is a Full code. Plan for utilizing home health:    Pt reports that he currently receives [facility], but could not recall the agency at this time. Likelihood of readmission: high  If Pt does not comply with treatment recommendation. Transition of Care Plan:  The discharge recommendation is for Pt to discharge to SNF. When discussed, Pt politely declined. Pt stated that his wife went to rehab in the past and he stated,\" she was worse off than before. \" CM explained the benefits and the importance of Pt discharging to SNF. Pt continued to decline. Pt reports he lives in 1 story home with wife. Pt reports that he has significant familial support at home. Pt reports that he has a walker, wheel chair, and crutchers at home. Pt is on O2 in room, however Pt states that he does not use O2 at home. Care Management Interventions PCP Verified by CM: Yes Mode of Transport at Discharge: Other (see comment) (TBD) Transition of Care Consult (CM Consult): Discharge Planning Physical Therapy Consult: Yes Occupational Therapy Consult: Yes Current Support Network: Lives with Spouse, Own Home Confirm Follow Up Transport: Family Plan discussed with Pt/Family/Caregiver: Yes Discharge Location Discharge Placement:  (TBD) Glenn Lloyd, BS, [facility] [phone]

## 2018-08-02 NOTE — PROGRESS NOTES
Cardiology Progress Note 8/2/2018 1:04 PM 
 
Admit Date: 7/27/2018 Subjective: Lying in bed. Says he feels good. Visit Vitals  /90 (BP 1 Location: Left arm, BP Patient Position: At rest)  Pulse 98  Temp 98.4 °F (36.9 °C)  Resp 24  
 Ht 5' 6\" (1.676 m)  Wt 64.9 kg (143 lb)  SpO2 92%  BMI 23.08 kg/m2  
 
07/31 1901 - 08/02 0700 In: 1110 [P.O.:1110] Out: 5762 [Urine:2725] Objective:  
  
Physical Exam: 
VS as above Chest scattered coarse BS Card RRR 2/6 KYLE no gallop Data Review:  
Labs:   
Hgb 10.2 CXR- no change. Pacer leads look good. Telemetry: prob aflutter R 120 Assessment: 1.  Shortness of breath with documented hypoxia, unclear cause. Pneumonia vs CHF 2.  Status post recent dual chamber pacemaker placement. S/p atrial lead revision 3.  Coronary artery disease with remote non-ST elevation myocardial infarction. 4.  History of paroxysmal atrial fibrillation and sick sinus syndrome with recent pacemaker placement. Recurrent afib/ flutter 5.  Pulmonary hypertension with right heart dysfunction of uncertain cause. 6.  Mild dementia. 7.  Chronic renal insufficiency. 8.  Chronic anemia. 9.  Prior CVA. 10.  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 11. Recent melena and hematochezia Plan: Will increase metoprolol to try and get better rate control. Otherwise cont current Rx

## 2018-08-02 NOTE — PROGRESS NOTES
GI Progress Note Main [place]) Curlee Lefort NR JQY:[id number] ATTG: Shanita Fitch MD  PCP: Yessi Cleary MD 
Date/Time:  2018 8761 Assessment:  
· Guaiac positive stool · Anemia- stable · GIB- no recurrence Plan:  
· Continue ASA and Plavix · Daily PPI · No plan for EGD or colonosocpy Will sign off Subjective:  
Discussed with RN events overnight. No recurrence of bleeding noted Complaint Y/N Description Abdominal Pain n Hematemesis n Hematochezia n Melena n   
Constipation n   
Diarrhea n Dyspepsia n Dysphagia n   
Jaundiced n   
Nausea/vomiting n Review of Systems: 
Symptom Y/N Comments  Symptom Y/N Comments Fever/Chills n   Chest Pain n   
Cough n   Headaches n Sputum n   Joint Pain n   
SOB/REYEZ n   Pruritis/Rash n Tolerating Diet y   Other Could NOT obtain due to:   
 
Objective: VITALS:  
Last 24hrs VS reviewed since prior progress note. Most recent are: 
Visit Vitals  /90 (BP 1 Location: Left arm, BP Patient Position: At rest)  Pulse 98  Temp 98.4 °F (36.9 °C)  Resp 24  
 Ht 5' 6\" (1.676 m)  Wt 64.9 kg (143 lb)  SpO2 92%  BMI 23.08 kg/m2 Intake/Output Summary (Last 24 hours) at 18 1242 Last data filed at 18 4743 Gross per 24 hour Intake              750 ml Output             1675 ml Net             -925 ml PHYSICAL EXAM: 
General: WD, WN. Alert, cooperative, no acute distress   
HEENT: NC, Atraumatic. PERRL. Anicteric sclerae. Lungs:  CTA Bilaterally. No Wheezing/Rhonchi/Rales. Heart:  Irr Irr,  No murmur (), No Rub/Gallops Abdomen: Soft, Non distended, Non tender.  +BS Extremities: No c/c/e Neurologic:  CN 2-12 gi, A/O X 3. No acute neurological distress Psych:   Good insight. Not anxious nor agitated. Lab and Radiology Data Reviewed: (see below) Medications Reviewed: (see below) PMH/SH reviewed - no change compared to H&P 
________________________________________________________________________ Total time spent with patient: 15 minutes ________________________________________________________________________ Care Plan discussed with: 
Patient y Family RN y  
           Consultant:    
 
aKrel Saunders MD  
 
Procedures: see electronic medical records for all procedures/Xrays and details which were not copied into this note but were reviewed prior to creation of Plan. LABS: 
Recent Labs 08/02/18 
 0536  08/01/18 
 0114  07/31/18 
 0245 WBC   --   12.2*  11.0 HGB  10.2*  9.9*  10.1* HCT  36.0*  34.5*  35.1*  
PLT   --   329  335 Recent Labs 08/02/18 
 1275  08/01/18 
 0114  07/31/18 
 0245 NA   --   145  145  
K  3.7  3.9  4.3 CL   --   112*  112* CO2   --   27  25 BUN   --   38*  38* CREA   --   2.68*  2.86* GLU   --   90  79 CA   --   9.1  9.1 MG  2.1   --    -- No results for input(s): SGOT, GPT, AP, TBIL, TP, ALB, GLOB, GGT, AML, LPSE in the last 72 hours. No lab exists for component: AMYP, HLPSE No results for input(s): INR, PTP, APTT in the last 72 hours. No lab exists for component: INREXT, INREXT No results for input(s): FE, TIBC, PSAT, FERR in the last 72 hours. No results found for: FOL, RBCF No results for input(s): PH, PCO2, PO2 in the last 72 hours. No results for input(s): CPK, CKMB in the last 72 hours. No lab exists for component: TROPONINI No results found for: COLOR, APPRN, SPGRU, REFSG, BECKIE, PROTU, GLUCU, KETU, BILU, UROU, LYDIA, LEUKU, GLUKE, EPSU, BACTU, WBCU, RBCU, CASTS, UCRY MEDICATIONS: 
Current Facility-Administered Medications Medication Dose Route Frequency  furosemide (LASIX) tablet 40 mg  40 mg Oral BID  pantoprazole (PROTONIX) tablet 40 mg  40 mg Oral ACB  sodium chloride (NS) flush 5-10 mL  5-10 mL IntraVENous Q8H  
 sodium chloride (NS) flush 5-10 mL  5-10 mL IntraVENous PRN  
 acetaminophen (TYLENOL) tablet 650 mg 650 mg Oral Q4H PRN  
 HYDROcodone-acetaminophen (NORCO) 5-325 mg per tablet 1 Tab  1 Tab Oral Q4H PRN  
 albuterol-ipratropium (DUO-NEB) 2.5 MG-0.5 MG/3 ML  3 mL Nebulization Q4H PRN  
 albuterol (PROVENTIL HFA, VENTOLIN HFA, PROAIR HFA) inhaler 2 Puff  2 Puff Inhalation Q6H PRN  
 amiodarone (CORDARONE) tablet 200 mg  200 mg Oral BID  fluticasone furoate (ARNUITY ELLIPTA) 100 mcg/puff  1 Puff Inhalation DAILY  finasteride (PROSCAR) tablet 5 mg  5 mg Oral DAILY  sertraline (ZOLOFT) tablet 25 mg  25 mg Oral QHS  metoprolol tartrate (LOPRESSOR) tablet 50 mg  50 mg Oral BID  
 donepezil (ARICEPT) tablet 10 mg  10 mg Oral QHS  ferrous sulfate tablet 325 mg  1 Tab Oral DAILY WITH BREAKFAST  clopidogrel (PLAVIX) tablet 75 mg  75 mg Oral DAILY  aspirin chewable tablet 81 mg  81 mg Oral DAILY  atorvastatin (LIPITOR) tablet 20 mg  20 mg Oral DAILY  ondansetron (ZOFRAN) injection 4 mg  4 mg IntraVENous Q4H PRN

## 2018-08-02 NOTE — PROGRESS NOTES
Chart reviewed and visit attempted pt politely refused stating he was tired right now and wanted to rest. Will continue to follow. x2 visit attempted pt continuing to be fatigued and not wanting to get out of bed. Will continue to follow.

## 2018-08-03 NOTE — PROGRESS NOTES
Cardiology Progress Note 8/3/2018     Admit Date: 7/27/2018 Admit Diagnosis: SOB (shortness of breath) on exertion  CC: none currently Assessment/Plan (as per Dr Geoff Hernandez):  
  
1.  Shortness of breath with documented hypoxia, unclear cause. Pneumonia vs CHF 2.  Status post recent dual chamber pacemaker placement. S/p atrial lead revision 3.  Coronary artery disease with remote non-ST elevation myocardial infarction. 4.  History of paroxysmal atrial fibrillation and sick sinus syndrome with recent pacemaker placement. Recurrent afib/ flutter 5.  Pulmonary hypertension with right heart dysfunction of uncertain cause. 6.  Mild dementia. 7.  Chronic renal insufficiency. 8.  Chronic anemia. 9.  Prior CVA. 10.  Chronic Guillain-Stinnett syndrome. 11. Recent melena and hematochezia Will increase metoprolol to try and get better rate control. Otherwise cont current Rx  
 
8/3: HR better with metoprolol 50 tid (amio @ 200 bid); On asa/plavix; no anticoag with GIB. On lasix 40 bid with stable Cr (2.3). Home when stable: need to see HR/O2 with ambulation (apparently not on O2 at home). For other plans, see orders. Hospital problem list  
Active Hospital Problems Diagnosis Date Noted  SOB (shortness of breath) on exertion 07/27/2018 Subjective: Tariq Orr reports Chest pain X none  consistent with:  Non-cardiac CP Atypical CP None now  On going  Anginal CP Dyspnea  none  at rest  with exertion    
   x improved  unchanged  worse PND X none  overnight Orthopnea X none  improved  unchanged  worse Presyncope X none  improved  unchanged  worse Ambulated in hallway without symptoms   Yes Ambulated in room without symptoms  Yes Objective:  
 Physical Exam: 
Overall VSSAF;   
Visit Vitals  /89 (BP 1 Location: Left arm, BP Patient Position: At rest;Supine)  Pulse (!) 107 Comment: notified RN  
 Temp 98.8 °F (37.1 °C)  
 Resp 20  
 Ht 5' 6\" (1.676 m)  Wt 64.3 kg (141 lb 11.2 oz)  SpO2 95%  BMI 22.87 kg/m2 Temp (24hrs), Av.2 °F (36.8 °C), Min:97.7 °F (36.5 °C), Max:98.8 °F (37.1 °C) Patient Vitals for the past 8 hrs: 
 Pulse 18 0759 (!) 107  
18 0448 93  
18 0025 88 Patient Vitals for the past 8 hrs: 
 Resp  
18 0759 20  
18 0448 22  
18 0025 20 Patient Vitals for the past 8 hrs: 
 BP  
18 0759 158/89  
18 0448 159/85  
18 0025 121/79 Intake/Output Summary (Last 24 hours) at 18 6403 Last data filed at 18 3073 Gross per 24 hour Intake              650 ml Output             1550 ml Net             -900 ml General Appearance: Well developed, well nourished, no acute distress. Ears/Nose/Mouth/Throat:   Normal MM; anicteric. JVP: WNL Resp:   Basal wheeze/rhonchi. Nl resp effort. Cardiovascular:  RRR, S1, S2 normal, no new murmur. No gallop or rub. Abdomen:   Soft, non-tender, bowel sounds are present. Extremities: No edema bilaterally. Skin: 
Neuro: Warm and dry. A/O x3, grossly nonfocal  
cath site intact w/o hematoma or new bruit; distal pulse unchanged  Yes Data Review:    
Telemetry independently reviewed  sinus x persistent afib/flutter. parox afib  NSVT  
 
ECG independently reviewed  NSR  afib  no significant changes  NSST-Tw chgs  
x no new ECG provided for review Lab results reviewed as noted below. Current medications reviewed as noted below. No results for input(s): PH, PCO2, PO2 in the last 72 hours. No results for input(s): CPK, CKMB, CKNDX, TROIQ in the last 72 hours. Recent Labs 18 
 0308  18 
 0235  18 
 8172 NA  147*   --   145  
K  4.0  3.7  3.9 CL  112*   --   112* CO2  29   --   27 BUN  27*   --   38* CREA  2.26*   --   2.68* GFRAA  33*   --   27* GLU  95   --   90  
PHOS  2.6   --    --   
CA  9.3   --   9.1 WBC  12.7*   -- 12.2* HGB  9.7*  10.2*  9.9*  
HCT  33.9*  36.0*  34.5*  
PLT  316   --   329 No results found for: CHOL, CHOLX, CHLST, CHOLV, HDL, LDL, LDLC, DLDLP, TGLX, TRIGL, TRIGP, CHHD, CHHDX No results for input(s): SGOT, GPT, AP, TBIL, TP, ALB, GLOB, GGT, AML, LPSE in the last 72 hours. No lab exists for component: AMYP, HLPSE No results for input(s): INR, PTP, APTT in the last 72 hours. No lab exists for component: INREXT No components found for: [place] Current Facility-Administered Medications Medication Dose Route Frequency  metoprolol tartrate (LOPRESSOR) tablet 50 mg  50 mg Oral TID  furosemide (LASIX) tablet 40 mg  40 mg Oral BID  pantoprazole (PROTONIX) tablet 40 mg  40 mg Oral ACB  sodium chloride (NS) flush 5-10 mL  5-10 mL IntraVENous Q8H  
 sodium chloride (NS) flush 5-10 mL  5-10 mL IntraVENous PRN  
 acetaminophen (TYLENOL) tablet 650 mg  650 mg Oral Q4H PRN  
 HYDROcodone-acetaminophen (NORCO) 5-325 mg per tablet 1 Tab  1 Tab Oral Q4H PRN  
 albuterol-ipratropium (DUO-NEB) 2.5 MG-0.5 MG/3 ML  3 mL Nebulization Q4H PRN  
 albuterol (PROVENTIL HFA, VENTOLIN HFA, PROAIR HFA) inhaler 2 Puff  2 Puff Inhalation Q6H PRN  
 amiodarone (CORDARONE) tablet 200 mg  200 mg Oral BID  fluticasone furoate (ARNUITY ELLIPTA) 100 mcg/puff  1 Puff Inhalation DAILY  finasteride (PROSCAR) tablet 5 mg  5 mg Oral DAILY  sertraline (ZOLOFT) tablet 25 mg  25 mg Oral QHS  donepezil (ARICEPT) tablet 10 mg  10 mg Oral QHS  ferrous sulfate tablet 325 mg  1 Tab Oral DAILY WITH BREAKFAST  clopidogrel (PLAVIX) tablet 75 mg  75 mg Oral DAILY  aspirin chewable tablet 81 mg  81 mg Oral DAILY  atorvastatin (LIPITOR) tablet 20 mg  20 mg Oral DAILY  ondansetron (ZOFRAN) injection 4 mg  4 mg IntraVENous Q4H PRN Luci Ruiz MD

## 2018-08-03 NOTE — PROGRESS NOTES
Hospitalist Progress Note NAME: Emily Sandoval :  1931 MRN:  [id number] Assessment / Plan: 
80 y.o.   male with pmh of HTN, CAD, HDL, recent PPM, COPD, CKD and dementia who presented to ED with SOB. CXR revealed increased interstitial and parenchymal pulmonary edema. Admitted with diagnosis of CHF vs PNA. She completed a 5-day course of levofloxacin and received IV lasix with clinical improvement.  
  
SOB Pulmonary edema CAP, bacterial 
CXR revealed increased interstitial and parenchymal pulmonary edema. VQ scan very low probability  
echo: LVEF 40 %. Mild diffuse hypokinesis. Clinical improvement. Changed lasix to po. continue fluid restriction, daily weights. completed 5 days of Levofloxacin. CXR- no change 
recheck cbc in am to assure leukocytosis improving Still on o2, try to wean  
  
Recurrent Afib/flutter S/P PPM 
Misplaced Atrial lead HR is controlled at rest and increased up to 120s while transferring to his chair. Continue lopressor and amiodarone. Close monitor. Cardiology is following. Atrial lead revision done by Dr. Jese Reilly on . Patient candidate for anticoagulation but will hold for now due to GI bleed. Still a little tachycardia, lopressor dose increased, follow today, if remains high, may need some further adjustments by cards. Hypothyroid, new- tsh 23, low FT4, new dx, start synthroid 50 and recheck tsh in 4 weeks. 
  
Melena/hematochezia Patient with episode of melena/hematochezia , no further episodes since then. GI consulted, no procedure indicated at this time. Continue PPI.  
  
CAD Stable. Continue Aspirin, Plavix and atorvastatin.  
  
COPD, suspect mild acute on chronic exac 
- not in acute exacerbation.  Continue with Arnuity ellipta and duoneb.  
-add flutter valve and xopenex low dose scheduled to see if resp improves, watch HR 
-wean o2 
-repeat cxr in am 
  
HTN 
BP is adequate.  
  
CKD4 Anemia, likely from renal failure At baseline. Creat 2.8. Close monitor.  
   
Hx TIA 
- on plavix 
   
Hx throat cancer 
   
Dementia  
- continue aricept 
   
Depression / anxiety 
- continue zoloft 
   
Debility PT/OT following. SNF at discharge. 
  
18.5 - 24.9 Normal weight / Body mass index is 23.56 kg/(m^2).    
Code status: Full Prophylaxis: Lovenox on hold with gi bleed Recommended Disposition: recs for snf. ..when ready Subjective: Chief Complaint / Reason for Physician Visit 
sob 8/2 Patient denies any shortness of breath chest pain currently on nasal cannula denies any cough or sputum production. He is asking about possibly going home as he will not rehab. 
 
8/3 pt denies sob, occ cough, no sputum, no cp, still on o2. We discussed that he needs rehab and he initially refused but after further discussion again with his daughter on the home, he agreed, but only short term. Had a long discussion with daughter. Patient was evaluated at 11 AM 
 
 
Discussed with RN events overnight. Review of Systems: 
Symptom Y/N Comments  Symptom Y/N Comments Fever/Chills    Chest Pain n   
Poor Appetite    Edema Cough y   Abdominal Pain n   
Sputum    Joint Pain SOB/REYEZ n   Pruritis/Rash Nausea/vomit    Tolerating PT/OT Diarrhea    Tolerating Diet Constipation    Other Could NOT obtain due to: confusion Objective: VITALS:  
Last 24hrs VS reviewed since prior progress note. Most recent are: 
Patient Vitals for the past 24 hrs: 
 Temp Pulse Resp BP SpO2  
08/03/18 1546 98.1 °F (36.7 °C) (!) 117 22 117/84 98 % 08/03/18 1239 98.1 °F (36.7 °C) 100 20 102/66 97 % 08/03/18 0759 98.8 °F (37.1 °C) (!) 107 20 158/89 95 % 08/03/18 0448 98 °F (36.7 °C) 93 22 159/85 95 % 08/03/18 0025 98.1 °F (36.7 °C) 88 20 121/79 96 % 08/02/18 1918 - - - - 100 % 08/02/18 1915 98.4 °F (36.9 °C) (!) 116 22 121/77 100 % Intake/Output Summary (Last 24 hours) at 08/03/18 1701 Last data filed at 08/03/18 5731 Gross per 24 hour Intake              650 ml Output             1550 ml Net             -900 ml PHYSICAL EXAM: 
Patient is a frail man oriented to self and hospital still had trouble with the month and the year is on nasal cannula but no distress. Conjunctiva pink oropharynx mucous membranes moist. Cardiovascular sounds regular on exam in the 100s no obvious murmurs rubs gallops. Lungs bilateral scattered rhonchi no crackles or wheezes. Abdomen is benign bowel sounds present soft nontender has a condom cath extremities no clubbing cyanosis Reviewed most cyurrent lab test results and cultures  YES Reviewed most current radiology test results   YES Review and summation of old records today    NO Reviewed patient's current orders and MAR    YES 
PMH/SH reviewed - no change compared to H&P 
________________________________________________________________________ Care Plan discussed with: 
  Comments Patient y Family  y daughter RNy y   
Care Manager y Consultant  y cards Multidiciplinary team rounds were held today with , nursing, pharmacist and clinical coordinator. Patient's plan of care was discussed; medications were reviewed and discharge planning was addressed. ________________________________________________________________________ Total NON critical care TIME:   Minutes Total CRITICAL CARE TIME Spent:   Minutes non procedure based Comments >50% of visit spent in counseling and coordination of care    
________________________________________________________________________ Brennon Carter MD  
 
Procedures: see electronic medical records for all procedures/Xrays and details which were not copied into this note but were reviewed prior to creation of Plan. LABS: 
I reviewed today's most current labs and imaging studies. Pertinent labs include: 
Recent Labs    08/03/18 
 0308  08/02/18 
 0235  08/01/18 
 9495 WBC  12.7*   --   12.2* HGB  9.7*  10.2*  9.9*  
HCT  33.9*  36.0*  34.5*  
PLT  316   --   329 Recent Labs 08/03/18 
 0308  08/02/18 
 0235  08/01/18 
 4861 NA  147*   --   145  
K  4.0  3.7  3.9 CL  112*   --   112* CO2  29   --   27  
GLU  95   --   90  
BUN  27*   --   38* CREA  2.26*   --   2.68* CA  9.3   --   9.1 MG  2.1  2.1   --   
PHOS  2.6   --    --   
 
 
Signed: Estelle Medina MD

## 2018-08-03 NOTE — PROGRESS NOTES
Bedside shift change report given to EDNA Casillas  by Epifanio Nieto. Report included the following information SBAR and Kardex. 1945-MEWS 4- HR >100. Md. Aware, asymptomatic. Charge RN made aware. Will continue to monitor.

## 2018-08-03 NOTE — PROGRESS NOTES
Patient MEWS score 4 due to elevate heart rate. All patient Doctors aware of elevated heart rate due to a fib rhythm.

## 2018-08-03 NOTE — PROGRESS NOTES
Problem: Self Care Deficits Care Plan (Adult) Goal: *Acute Goals and Plan of Care (Insert Text) Occupational Therapy Goals Initiated 8/1/2018 1. Patient will perform grooming at the sink with supervision/set-up within 7 day(s). 2.  Patient will perform bathing at the sink with moderate assistance  within 7 day(s). 3.  Patient will perform lower body dressing with moderate assistance  within 7 day(s). 4.  Patient will perform toilet transfers with minimal assistance/contact guard assist within 7 day(s). 5.  Patient will perform all aspects of toileting with supervision/set-up within 7 day(s). 6.  Patient will participate in upper extremity therapeutic exercise/activities with supervision/set-up for 5 minutes within 7 day(s). 7.  Patient will utilize energy conservation techniques during functional activities with verbal cues within 7 day(s). Occupational Therapy TREATMENT Patient: Tramaine Grimm (43 y.o. male) Date: 8/3/2018 Diagnosis: SOB (shortness of breath) on exertion <principal problem not specified> Precautions: Fall (Pacemaker) Chart, occupational therapy assessment, plan of care, and goals were reviewed. ASSESSMENT: 
Patient with minimal to no insight to deficits, unable to get him to understand need for oxygen, and if he has at home he will likely not wear. Recommend use of rolling walker, however he may not use that either. Reporting this date that his wife will assist him however in prior note his wife was ill and needed assist herself. Continue to recommend rehab or 24/7 hands on assist at home. Progression toward goals: 
[]       Improving appropriately and progressing toward goals [x]       Improving slowly and progressing toward goals 
[]       Not making progress toward goals and plan of care will be adjusted PLAN: 
Patient continues to benefit from skilled intervention to address the above impairments.   Continue treatment per established plan of care. 
Discharge Recommendations:  Home Health with 24/7 assist and Simon Vu Further Equipment Recommendations for Discharge: SUBJECTIVE:  
Patient stated I'm fine.  OBJECTIVE DATA SUMMARY:  
Cognitive/Behavioral Status: 
Neurologic State: Alert Orientation Level: Oriented to person;Oriented to place; Disoriented to situation Cognition: Poor safety awareness;Memory loss; Impaired decision making Perception: Appears intact Perseveration: Perseverates during conversation Safety/Judgement: Decreased awareness of need for assistance;Decreased awareness of need for safety; Lack of insight into deficits; Fall prevention;Home safety Functional Mobility and Transfers for ADLs: 
Bed Mobility: 
Rolling: Stand-by assistance Supine to Sit: Stand-by assistance (HOB elevated) Scooting: Stand-by assistance Transfers: 
Sit to Stand: Minimum assistance;Contact guard assistance Functional Transfers Bathroom Mobility: Minimum assistance;Contact guard assistance Toilet Transfer : Minimum assistance;Contact guard assistance; Adaptive equipment; Additional time;Assist x1 Bed to Chair: Minimum assistance;Contact guard assistance; Adaptive equipment (RW support) Balance: 
Sitting: Intact Standing: Impaired; With support Standing - Static: Good Standing - Dynamic : Fair ADL Intervention: 
  
 
 educated on pursed lip breathing throughout, unable to facilitate good performance, removed nasal cannula on arrival and sats upper 80's to low 90's, instructed on need to have oxygen at home 
 
  instructed on use of walker and positioning Toileting Toileting Assistance: Moderate assistance Bladder Hygiene: Minimum assistance Bowel Hygiene: Moderate assistance (incontinent bowel) Clothing Management: Minimum assistance Adaptive Equipment: Belita Gallop Cognitive Retraining Safety/Judgement: Decreased awareness of need for assistance;Decreased awareness of need for safety; Lack of insight into deficits; Fall prevention;Home safety Pain: 
  no complaint Activity Tolerance:  
Fair Please refer to the flowsheet for vital signs taken during this treatment. After treatment:  
[x] Patient left in no apparent distress sitting up in chair 
[] Patient left in no apparent distress in bed 
[x] Call bell left within reach [x] Nursing notified 
[] Caregiver present [x] Bed alarm activated COMMUNICATION/COLLABORATION:  
The patients plan of care was discussed with: Physical Therapist and Registered Nurse Tracy Durant OTR/L Time Calculation: 16 mins

## 2018-08-03 NOTE — PROGRESS NOTES
Problem: Mobility Impaired (Adult and Pediatric) Goal: *Acute Goals and Plan of Care (Insert Text) Physical Therapy Goals Initiated 8/1/2018 1. Patient will move from supine to sit and sit to supine , scoot up and down and roll side to side in bed with independence within 7 day(s). 2.  Patient will transfer from bed to chair and chair to bed with modified independence using the least restrictive device within 7 day(s). 3.  Patient will perform sit to stand with modified independence within 7 day(s). 4.  Patient will ambulate with supervision/set-up for 200 feet with the least restrictive device within 7 day(s). 5.  Patient will ascend/descend 5 stairs with 1 handrail(s) with supervision/set-up within 7 day(s). physical Therapy TREATMENT Patient: Mery Bishop (78 y.o. male) Date: 8/3/2018 Diagnosis: SOB (shortness of breath) on exertion <principal problem not specified> Precautions: Fall (Pacemaker) Chart, physical therapy assessment, plan of care and goals were reviewed. ASSESSMENT: 
Pt received supine in bed and agreeable to PT intervention. Pt cleared by nursing for mobility. Pt with slow progress towards therapy goals. Pt has refused SNF rehab. Pt had removed supplemental O2 prior to PT arrival, however uncertain how long O2 had been off. SaO2 86% on RA. 2 L/min supplemental O2 placed back on patient and needed ~ 1 minute and MAX cueing (verbal and visual) to improve O2 sats >90%. Bed mobility performed with SBA, increased time, VCs, HOB elevated, and use of bed railings. Transfers performed with min/CGA x 1-2, needing VCs for safe hand placement with use of RW. Pt incontinent of bowels and needed min-mod A x 2 to stand with RW support and be cleaned up. He ambulated 35 ft with RW and CGA/min A x 1, however impulsive and with disregard for O2 line.  He needed education and cueing on fall prevention with O2 line, however pt frequent reported that he did not need supplemental O2 despite education on needing such. SaO2 87-91% on 2 L/min O2 with mobility. Pt was left sitting in bedside chair with all needs met, RN present, and chair alarm on following therapy session. Supplemental O2 increased to 3 L/min O2 as instructed by nursing as SaO2 90-91% sitting in chair. HR 90-120s bpm during activity. Pt with poor safety awareness, confusion, lack of insight into functional deficits, and is a HIGH fall risk. Recommend pt discharge to SNF rehab to improve functional mobility and independence, however pt has declined. IF patient is to discharge home, he will require HHPT, 24/7 assistance, and use of RW. Recommend with nursing patient to complete as able in order to maintain strength, endurance and independence: OOB to chair 3x/day and walking daily with min assist. Thank you for your assistance. Progression toward goals: 
[]    Improving appropriately and progressing toward goals [x]    Improving slowly and progressing toward goals 
[]    Not making progress toward goals and plan of care will be adjusted PLAN: 
Patient continues to benefit from skilled intervention to address the above impairments. Continue treatment per established plan of care. Discharge Recommendations:  Simon Vu Further Equipment Recommendations for Discharge:  TBD by rehab SUBJECTIVE:  
Patient stated I don't need oxygen do I? OBJECTIVE DATA SUMMARY:  
Critical Behavior: 
Neurologic State: Alert Orientation Level: Oriented X4 Cognition: Follows commands Safety/Judgement: Fall prevention Functional Mobility Training: 
Bed Mobility: 
Rolling: Stand-by assistance Supine to Sit: Stand-by assistance (HOB elevated) Scooting: Stand-by assistance Transfers: 
Sit to Stand: Minimum assistance;Contact guard assistance Stand to Sit: Contact guard assistance;Minimum assistance (mildly uncontrolled descent into sitting) Bed to Chair: Minimum assistance;Contact guard assistance; Adaptive equipment (RW support) Balance: 
Sitting: Intact Standing: Impaired; With support Standing - Static: Good Standing - Dynamic : Fair Ambulation/Gait Training: 
Distance (ft): 35 Feet (ft) Assistive Device: Gait belt;Walker, rolling Ambulation - Level of Assistance: Contact guard assistance;Minimal assistance;Assist x1;Additional time; Adaptive equipment Gait Abnormalities: Decreased step clearance Base of Support: Narrowed Speed/Delia: Pace decreased (<100 feet/min); Shuffled Step Length: Left shortened;Right shortened Pain: 
Pain Scale 1: Numeric (0 - 10) Pain Intensity 1: 0 Activity Tolerance:  
Fair - SaO2 86-92% on 2 L/min O2; HR 90-120s bpm with activity; no pain complaints Please refer to the flowsheet for vital signs taken during this treatment. After treatment:  
[x]    Patient left in no apparent distress sitting up in chair 
[]    Patient left in no apparent distress in bed 
[x]    Call bell left within reach [x]    Nursing notified 
[x]    Caregiver present [x]    Bed alarm activated COMMUNICATION/COLLABORATION:  
The patients plan of care was discussed with: Physical Therapist, Occupational Therapist and Registered Nurse Mary Longoria PT, DPT Time Calculation: 18 mins

## 2018-08-03 NOTE — PROGRESS NOTES
Cardiopulmonary Care Interdisciplinary rounds were held today to discuss patient's plan of care and outcomes. The following members were present: NP/Physician, Pharmacy, Nursing and Case Management. Expected Length of Stay:  3d 7h 
 
Plan of Care: Continue current treatment plan. Anticipated discharge within 24 hours

## 2018-08-03 NOTE — ROUTINE PROCESS
Home Oxygen Test 
Date of test: 8/3/2018 Time of test: 1300 Sa02 89 % on room air AT REST. Sa02 83 % on room air DURING AMBULATION. Sa02 96 % on 2 Liters DURING AMBULATION. Sa02 93 % on 2 Liters AT REST/AFTER AMBULATION.

## 2018-08-03 NOTE — PROGRESS NOTES
Initial Nutrition Assessment: 
 
INTERVENTIONS/RECOMMENDATIONS:  
· Continue current diet ASSESSMENT:  
Chart reviewed, medically noted for PMH shown below. Assessing pt due to LOS. Pt reports a good appetite and eating well. Weight history shows no significant weight loss PTA. Past Medical History:  
Diagnosis Date  Cancer ([facility].) 2005  
 throat Cancer; laser surgery, no chemo, 30 radiation Tx, breast ca  Chronic kidney disease   
 stage IV:   Dr Stefan Chamberlain ([phone])  [facility] pulmonary disease ([facility]  Dysphagia 8/11/2015  GERD (gastroesophageal reflux disease)  [facility]) 1990  
 as of 8/3/15 pt has aching/weakness in legs: Dr Javier Calderon History of heart attack 1981 Dr Mony GAYTAN (hard of hearing) doesn't wear hearing aides  Hypercholesteremia  Hypertension  Personal history of colonic polyps 8/11/2015  Senile dementia Dr Chantel Hernadez Diet Order: Cardiac 
% Eaten:  No data found. Pertinent Medications: [x]Reviewed:  
Pertinent Labs: [x]Reviewed:  
Food Allergies: [x]NKFA  []Other Last BM: 8/3 Edema:        []RUE   []LUE   []RLE   []LLE Pressure Injury:      [] Stage I   [] Stage II   [] Stage III   [] Stage IV Wt Readings from Last 30 Encounters:  
08/03/18 64.3 kg (141 lb 11.2 oz)  
07/12/18 66.2 kg (146 lb) 05/11/18 72.6 kg (160 lb) 05/11/17 69.4 kg (153 lb)  
11/10/16 73.9 kg (163 lb)  
11/24/15 78.9 kg (174 lb) 08/11/15 77.6 kg (171 lb) 05/18/15 79.8 kg (176 lb)  
03/10/14 78.5 kg (173 lb) 05/15/13 79.4 kg (175 lb) 12/11/12 79.7 kg (175 lb 9.6 oz) Anthropometrics:  
Height: 5' 6\" (167.6 cm) Weight: 64.3 kg (141 lb 11.2 oz) IBW (%IBW):   ( ) UBW (%UBW):   (  %) Last Weight Metrics: 
Weight Loss Metrics 8/3/2018 7/12/2018 5/11/2018 5/11/2017 11/10/2016 11/24/2015 8/11/2015 Today's Wt 141 lb 11.2 oz 146 lb 160 lb 153 lb 163 lb 174 lb 171 lb BMI 22.87 kg/m2 23.57 kg/m2 25.06 kg/m2 23.61 kg/m2 22.73 kg/m2 24.28 kg/m2 23.86 kg/m2 BMI: Body mass index is 22.87 kg/(m^2). This BMI is indicative of: 
 []Underweight    [x]Normal    []Overweight    [] Obesity   [] Extreme Obesity (BMI>40) Estimated Nutrition Needs (Based on):  
1625 Kcals/day (BMR: 1250 x 1.3) , 65 g (1 g/kg) Protein Carbohydrate: At Least 130 g/day  Fluids: 1625 mL/day (1ml/kcal) or per primary team 
 
NUTRITION DIAGNOSES:  
Problem:  No nutritional diagnosis at this time Etiology: related to Signs/Symptoms: as evidenced by NUTRITION INTERVENTIONS: 
Meals/Snacks: General/healthful diet GOAL:  
consume >50% of meals in 5-7 days LEARNING NEEDS (Diet, Food/Nutrient-Drug Interaction):  
 [x] None Identified 
 [] Identified and Education Provided/Documented 
 [] Identified and Pt declined/was not appropriate Cultureal, Sikh, OR Ethnic Dietary Needs:  
 [x] None Identified 
 [] Identified and Addressed 
 
 [x] Interdisciplinary Care Plan Reviewed/Documented  
 [x] Discharge Planning:  Heart healthy diet MONITORING /EVALUATION:  
  
Food/Nutrient Intake Outcomes: Total energy intake Physical Signs/Symptoms Outcomes: Weight/weight change, Electrolyte and renal profile, GI 
 
NUTRITION RISK:  
 [] High              [] Moderate           [x]  Low  []  Minimal/Uncompromised PT SEEN FOR:  
 []  MD Consult: []Calorie Count []Diabetic Diet Education []Diet Education []Electrolyte Management []General Nutrition Management and Supplements []Management of Tube Feeding []TPN Recommendations []  RN Referral:  []MST score >=2 
   []Enteral/Parenteral Nutrition PTA []Pregnant: Gestational DM or Multigestation 
   []Pressure Ulcer/Wound Care needs 
     
[]  Low BMI [x]  LOS Referral  
 
 
Jim Lim, RDN Pager [phone] Weekend Pager [phone]

## 2018-08-03 NOTE — PROGRESS NOTES
CM spoke with Pt's daughter Amanda Cardozo [phone] in regards to discharge recommendation for SNF. CM was informed by daughter that she had spoken with attending in regards to recommendation, and Pt was receptive. Pt requested to discharge to [facility]. CM sent referral via [employer]. CM was notified by admissions [facility] [phone]. that Pt has been accepted. CM was informed that they would be able to accept Pt on Saturday if not today. CM notified Pt's daughter. Pt daughter asked to be contacted once Pt is ready for discharge. Cm notified attending. Attending stated that Pt would not be discharging today, stating that cardiology would like to assess Pt over night. CM will continue to follow-up with discharge planning. Glenn Lloyd, BS, [address] [facility] [phone]

## 2018-08-04 NOTE — PROGRESS NOTES
Hospitalist Progress Note NAME: Tramaine Grimm :  1931 MRN:  [id number] Assessment / Plan: 
80 y.o.   male with pmh of HTN, CAD, HDL, recent PPM, COPD, CKD and dementia who presented to ED with SOB. CXR revealed increased interstitial and parenchymal pulmonary edema. Admitted with diagnosis of CHF vs PNA. She completed a 5-day course of levofloxacin and received IV lasix with clinical improvement.  
  
SOB Pulmonary edema, 2nd to acute on chronic syst chf and rvr afib/flutter CAP, bacterial 
CXR revealed increased interstitial and parenchymal pulmonary edema. VQ scan very low probability  
echo: LVEF 40 %. Mild diffuse hypokinesis. Clinical improvement. Changed lasix to po. continue fluid restriction, daily weights. completed 5 days of Levofloxacin. CXR- improved aeration, but persistent interstitial changes B, reviewed images, doubt CHF Still on o2, try to wean  
  
Recurrent Afib/flutter S/P PPM 
Misplaced Atrial lead HR is controlled at rest and increased up to 120s while transferring to his chair. Continue lopressor and amiodarone. Close monitor. Cardiology is following. Atrial lead revision done by Dr. Chloé Delgado on . Patient candidate for anticoagulation but will hold for now due to GI bleed and suspect fall risk. Still a little tachycardia, lopressor dose increased again today, follow today Diarrhea- at risk for c diff, increasing leukocytosis,  s/p abx, add probiotics, send for fecal leuks and r/o c diff. Hypothyroid, new- tsh 23, low FT4, new dx, start synthroid 50 and recheck tsh in 4 weeks. 
  
Melena/hematochezia Patient with episode of melena/hematochezia , no further episodes since then. GI consulted, no procedure indicated at this time. Continue PPI.  
  
CAD Stable. Continue Aspirin, Plavix and atorvastatin.  
  
COPD, suspect mild acute on chronic exac -  Continue with Arnuity ellipta and duoneb.  
-added flutter valve and xopenex low dose scheduled to see if resp improves, watch HR 
-wean o2 
-will try to go up on nebs today, avoid IV/po steroids for now, cont inhaled 
  
HTN 
BP is adequate.  
  
CKD4 Anemia, likely from renal failure At baseline. Creat 2.7. Close monitor.  
   
Hx TIA 
- on plavix 
   
Hx throat cancer 
   
Dementia  
- continue aricept 
   
Depression / anxiety 
- continue zoloft 
   
Debility PT/OT following. SNF at discharge. 
  
18.5 - 24.9 Normal weight / Body mass index is 23.56 kg/(m^2).    
Code status: Full Prophylaxis: Lovenox on hold with gi bleed Recommended Disposition: recs for snf. ..when ready, need to r/o c. diff Subjective: Chief Complaint / Reason for Physician Visit 
sob 8/2 Patient denies any shortness of breath chest pain currently on nasal cannula denies any cough or sputum production. He is asking about possibly going home as he will not rehab. 
 
8/3 pt denies sob, occ cough, no sputum, no cp, still on o2. We discussed that he needs rehab and he initially refused but after further discussion again with his daughter on the home, he agreed, but only short term. Had a long discussion with daughter. 8/3 pt says he is breathing fine and feels fine. No cough, no sob no cp. Still requiring o2.  ++ loose watery stools last night and today, multiple episodes. No abd pain, n/v.  
 
Patient was evaluated at 11:30 AM 
 
 
Discussed with RN events overnight. Review of Systems: 
Symptom Y/N Comments  Symptom Y/N Comments Fever/Chills    Chest Pain n   
Poor Appetite    Edema Cough n   Abdominal Pain n   
Sputum    Joint Pain SOB/REYEZ n   Pruritis/Rash Nausea/vomit    Tolerating PT/OT Diarrhea y   Tolerating Diet Constipation    Other Could NOT obtain due to: confusion Objective: VITALS:  
Last 24hrs VS reviewed since prior progress note.  Most recent are: 
Patient Vitals for the past 24 hrs: 
 Temp Pulse Resp BP SpO2  
08/04/18 1144 98.9 °F (37.2 °C) (!) 114 20 116/65 97 % 08/04/18 0751 98 °F (36.7 °C) 80 20 (!) 157/92 93 % 08/04/18 0741 - - - - 90 % 08/04/18 0406 97.8 °F (36.6 °C) 84 20 (!) 141/95 96 % 08/04/18 0002 98 °F (36.7 °C) 88 22 125/70 97 % 08/03/18 2156 - - - - 95 % 08/03/18 2144 - (!) 112 - 119/79 -  
08/03/18 2004 97.9 °F (36.6 °C) (!) 114 22 112/60 98 % 08/03/18 1546 98.1 °F (36.7 °C) (!) 117 22 117/84 98 % Intake/Output Summary (Last 24 hours) at 08/04/18 1245 Last data filed at 08/04/18 3693 Gross per 24 hour Intake                0 ml Output             1575 ml Net            -1575 ml PHYSICAL EXAM: 
Patient is a frail man oriented to self and hospital on nasal cannula but no distress. Conjunctiva pink oropharynx mucous membranes a little tacky. Cardiovascular sounds regular on exam no obvious murmurs rubs gallops. Lungs clearer, no w/r/c. Abdomen is benign bowel sounds present soft nontender extremities no clubbing cyanosis, no edema b Reviewed most cyurrent lab test results and cultures  YES Reviewed most current radiology test results   YES Review and summation of old records today    NO Reviewed patient's current orders and MAR    YES 
PMH/ reviewed - no change compared to H&P 
________________________________________________________________________ Care Plan discussed with: 
  Comments Patient y Family RNy y   
Care Manager Consultant  y cards Multidiciplinary team rounds were held today with , nursing, pharmacist and clinical coordinator. Patient's plan of care was discussed; medications were reviewed and discharge planning was addressed. ________________________________________________________________________ Total NON critical care TIME:   Minutes Total CRITICAL CARE TIME Spent:   Minutes non procedure based Comments >50% of visit spent in counseling and coordination of care ________________________________________________________________________ Gladys Rios MD  
 
Procedures: see electronic medical records for all procedures/Xrays and details which were not copied into this note but were reviewed prior to creation of Plan. LABS: 
I reviewed today's most current labs and imaging studies. Pertinent labs include: 
Recent Labs 08/04/18 
 2839  08/03/18 
 0308  08/02/18 
 0235 WBC  13.4*  12.7*   --   
HGB  9.9*  9.7*  10.2* HCT  34.4*  33.9*  36.0*  
PLT  331  316   --   
 
Recent Labs 08/04/18 
 9034  08/03/18 
 0308  08/02/18 
 0235 NA  144  147*   --   
K  3.1*  4.0  3.7 CL  108  112*   --   
CO2  28  29   --   
GLU  103*  95   --   
BUN  26*  27*   --   
CREA  2.17*  2.26*   --   
CA  9.3  9.3   --   
MG  2.1  2.1  2.1 PHOS   --   2.6   --   
 
 
Signed: Gladys Rios MD

## 2018-08-04 NOTE — PROGRESS NOTES
Cardiology Progress Note 8/4/2018     Admit Date: 7/27/2018 Admit Diagnosis: SOB (shortness of breath) on exertion  CC: none currently Assessment/Plan (as per Dr Ramila Simmons):  
  
1.  Shortness of breath with documented hypoxia, unclear cause. Pneumonia vs CHF 2.  Status post recent dual chamber pacemaker placement. S/p atrial lead revision 3.  Coronary artery disease with remote non-ST elevation myocardial infarction. 4.  History of paroxysmal atrial fibrillation and sick sinus syndrome with recent pacemaker placement. Recurrent afib/ flutter 5.  Pulmonary hypertension with right heart dysfunction of uncertain cause. 6.  Mild dementia. 7.  Chronic renal insufficiency. 8.  Chronic anemia. 9.  Prior CVA. 10.  Chronic Guillain-Elrama syndrome. 11. Recent melena and hematochezia Will increase metoprolol to try and get better rate control. Otherwise cont current Rx  
 
8/3: HR better with metoprolol 50 tid (amio @ 200 bid); On asa/plavix; no anticoag with GIB. On lasix 40 bid with stable Cr (2.3). Home when stable: need to see HR/O2 with ambulation (apparently not on O2 at home). 8/4: HR occ up & BP up; change metoprolol to 100 bid; Decrease lasix to qday. For other plans, see orders. Hospital problem list  
Active Hospital Problems Diagnosis Date Noted  SOB (shortness of breath) on exertion 07/27/2018 Subjective: Claudetta Minks reports Chest pain X none  consistent with:  Non-cardiac CP Atypical CP None now  On going  Anginal CP Dyspnea  none  at rest  with exertion    
   x improved  unchanged  worse PND X none  overnight Orthopnea X none  improved  unchanged  worse Presyncope X none  improved  unchanged  worse Ambulated in hallway without symptoms   Yes Ambulated in room without symptoms  Yes Objective:  
 Physical Exam: 
Overall VSSAF;   
Visit Vitals  BP (!) 157/92 (BP 1 Location: Right arm, BP Patient Position: At rest)  Pulse 80  Temp 98 °F (36.7 °C)  Resp 20  
 Ht 5' 6\" (1.676 m)  Wt 60.3 kg (133 lb)  SpO2 93%  BMI 21.47 kg/m2 Temp (24hrs), Av °F (36.7 °C), Min:97.8 °F (36.6 °C), Max:98.1 °F (36.7 °C) Patient Vitals for the past 8 hrs: 
 Pulse 18 0751 80  
18 0406 84 Patient Vitals for the past 8 hrs: 
 Resp  
18 0751 20  
18 0406 20 Patient Vitals for the past 8 hrs: 
 BP  
18 0751 (!) 157/92  
18 0406 (!) 141/95 Intake/Output Summary (Last 24 hours) at 18 1138 Last data filed at 18 0886 Gross per 24 hour Intake                0 ml Output             1575 ml Net            -1575 ml General Appearance: Well developed, well nourished, no acute distress. Ears/Nose/Mouth/Throat:   Normal MM; anicteric. JVP: WNL Resp:   Min basal wheeze/rhonchi. Nl resp effort. Cardiovascular:  RRR, S1, S2 normal, no new murmur. No gallop or rub. Abdomen:   Soft, non-tender, bowel sounds are present. Extremities: No edema bilaterally. Skin: 
Neuro: Warm and dry. A/O x3, grossly nonfocal  
cath site intact w/o hematoma or new bruit; distal pulse unchanged  Yes Data Review:    
Telemetry independently reviewed  sinus x persistent afib/flutter. parox afib  NSVT  
 
ECG independently reviewed  NSR  afib  no significant changes  NSST-Tw chgs  
x no new ECG provided for review Lab results reviewed as noted below. Current medications reviewed as noted below. No results for input(s): PH, PCO2, PO2 in the last 72 hours. No results for input(s): CPK, CKMB, CKNDX, TROIQ in the last 72 hours. Recent Labs 18 
 1913  18 
 0308  18 
 0235 NA  144  147*   --   
K  3.1*  4.0  3.7 CL  108  112*   --   
CO2  28  29   --   
BUN  26*  27*   --   
CREA  2.17*  2.26*   --   
GFRAA  35*  33*   --   
GLU  103*  95   --   
PHOS   --   2.6   --   
CA  9.3  9.3   --   
WBC  13.4*  12.7*   -- HGB  9.9*  9.7*  10.2* HCT  34.4*  33.9*  36.0*  
PLT  331  316   -- No results found for: CHOL, CHOLX, CHLST, CHOLV, HDL, LDL, LDLC, DLDLP, TGLX, TRIGL, TRIGP, CHHD, CHHDX No results for input(s): SGOT, GPT, AP, TBIL, TP, ALB, GLOB, GGT, AML, LPSE in the last 72 hours. No lab exists for component: AMYP, HLPSE No results for input(s): INR, PTP, APTT in the last 72 hours. No lab exists for component: INREXT, INREXT No components found for: [place] Current Facility-Administered Medications Medication Dose Route Frequency  metoprolol tartrate (LOPRESSOR) tablet 100 mg  100 mg Oral BID  metoprolol (LOPRESSOR) injection 5 mg  5 mg IntraVENous Q4H PRN  
 levothyroxine (SYNTHROID) tablet 50 mcg  50 mcg Oral 6am  
 levalbuterol (XOPENEX) nebulizer soln 0.63 mg/3 mL  0.63 mg Nebulization BID  ipratropium (ATROVENT) 0.02 % nebulizer solution 0.5 mg  0.5 mg Nebulization BID  furosemide (LASIX) tablet 40 mg  40 mg Oral BID  pantoprazole (PROTONIX) tablet 40 mg  40 mg Oral ACB  sodium chloride (NS) flush 5-10 mL  5-10 mL IntraVENous Q8H  
 sodium chloride (NS) flush 5-10 mL  5-10 mL IntraVENous PRN  
 acetaminophen (TYLENOL) tablet 650 mg  650 mg Oral Q4H PRN  
 HYDROcodone-acetaminophen (NORCO) 5-325 mg per tablet 1 Tab  1 Tab Oral Q4H PRN  
 albuterol-ipratropium (DUO-NEB) 2.5 MG-0.5 MG/3 ML  3 mL Nebulization Q4H PRN  
 albuterol (PROVENTIL HFA, VENTOLIN HFA, PROAIR HFA) inhaler 2 Puff  2 Puff Inhalation Q6H PRN  
 amiodarone (CORDARONE) tablet 200 mg  200 mg Oral BID  fluticasone furoate (ARNUITY ELLIPTA) 100 mcg/puff  1 Puff Inhalation DAILY  finasteride (PROSCAR) tablet 5 mg  5 mg Oral DAILY  sertraline (ZOLOFT) tablet 25 mg  25 mg Oral QHS  donepezil (ARICEPT) tablet 10 mg  10 mg Oral QHS  ferrous sulfate tablet 325 mg  1 Tab Oral DAILY WITH BREAKFAST  clopidogrel (PLAVIX) tablet 75 mg  75 mg Oral DAILY  aspirin chewable tablet 81 mg  81 mg Oral DAILY  atorvastatin (LIPITOR) tablet 20 mg  20 mg Oral DAILY  ondansetron (ZOFRAN) injection 4 mg  4 mg IntraVENous Q4H PRN Rogerio Grajeda MD

## 2018-08-04 NOTE — PROGRESS NOTES
Bedside shift change report given to Geena Yip RN (oncoming nurse). Report included the following information SBAR, Kardex, Intake/Output, MAR and Recent Results. SHIFT SUMMARY: 
 
 
 
 
 
[address] NURSING NOTE Admission Date 7/27/2018 Admission Diagnosis SOB (shortness of breath) on exertion Consults IP CONSULT TO GASTROENTEROLOGY Cardiac Monitoring [x] Yes [] No  
  
Purposeful Hourly Rounding [x] Yes   
Bony Score Total Score: 3 Bony score 3 or > [x] Bed Alarm [] Avasys [] 1:1 sitter [] Patient refused (Signed refusal form in chart) Bharathi Score Bharathi Score: 18 Bharathi score 14 or < [] PMT consult [] Wound Care consult  
 []  Specialty bed  [] Nutrition consult Influenza Vaccine Received Flu Vaccine for Current Season (usually Sept-March): Not Flu Season Oxygen needs? [] Room air Oxygen @  [x]1L    []2L    []3L   []4L    []5L   []6L via NC Chronic home O2 use? [] Yes [x] No 
Perform O2 challenge test and document in progress note using smartphTapZillae (.Homeoxygen) Last bowel movement Last Bowel Movement Date: 08/04/18 Urinary Catheter [REMOVED] Urinary Catheter 07/30/18-Indications for Use: Surgery [REMOVED] Condom Catheter 07/30/18-Indications for Use:  (accidnetal removal) [REMOVED] Condom Catheter 07/30/18-Urine Output (mL): 275 ml LDAs Peripheral IV 07/30/18 Left Antecubital (Active) Site Assessment Clean, dry, & intact 8/4/2018  7:46 PM  
Phlebitis Assessment 0 8/4/2018  7:46 PM  
Infiltration Assessment 0 8/4/2018  7:46 PM  
Dressing Status Clean, dry, & intact; Clean 8/4/2018  7:46 PM  
Dressing Type Transparent 8/4/2018  7:46 PM  
Hub Color/Line Status Pink;Capped 8/4/2018  7:46 PM  
Action Taken Blood drawn 7/30/2018  3:35 AM  
                  
  
Readmission Risk Assessment Tool Score High Risk   
      
 22 Total Score 3 Has Seen PCP in Last 6 Months (Yes=3, No=0) 2 . Living with Significant Other.  Assisted Living. LTAC. SNF. or  
Rehab  
 3 Patient Length of Stay (>5 days = 3) 4 IP Visits Last 12 Months (1-3=4, 4=9, >4=11) 5 Pt. Coverage (Medicare=5 , Medicaid, or Self-Pay=4) 5 Charlson Comorbidity Score (Age + Comorbid Conditions) Expected Length of Stay 3d 7h Actual Length of Stay 8

## 2018-08-04 NOTE — PROGRESS NOTES
Dr Sophia Polk made aware of patient's increase in frequency of diarrhea. Initiated enteric precautions and notified infection control and nursing supervisor of potential c diff. Sample collected for future labs. Waiting to hear from infection control before sending sample. 1 sample delivered to lab per infection control Belen Villeda instructions.

## 2018-08-04 NOTE — PROGRESS NOTES
Problem: Falls - Risk of 
Goal: *Absence of Falls Document Luiz Burciaga Fall Risk and appropriate interventions in the flowsheet. Outcome: Progressing Towards Goal 
Fall Risk Interventions: 
Mobility Interventions: Assess mobility with egress test, Bed/chair exit alarm, Communicate number of staff needed for ambulation/transfer, Mechanical lift, OT consult for ADLs, Patient to call before getting OOB, PT Consult for mobility concerns Mentation Interventions: Adequate sleep, hydration, pain control, Bed/chair exit alarm, Door open when patient unattended, Evaluate medications/consider consulting pharmacy, Eyeglasses and hearing aids, Familiar objects from home Medication Interventions: Assess postural VS orthostatic hypotension, Bed/chair exit alarm, Evaluate medications/consider consulting pharmacy, Patient to call before getting OOB, Teach patient to arise slowly Elimination Interventions: Bed/chair exit alarm, Call light in reach, Elevated toilet seat, Patient to call for help with toileting needs, Toilet paper/wipes in reach, Toileting schedule/hourly rounds, Urinal in reach

## 2018-08-04 NOTE — PROGRESS NOTES
1905-Bedside shift change report given to VinnyRN  by EDNA Marcos. Report included the following information SBAR.  
 
2004-MEWS of 4 dt . Drs. Are aware, asymptomatic. Will continue to monitor.

## 2018-08-05 NOTE — PROGRESS NOTES
Hospitalist Progress Note    NAME: Destin Martinez   :  1931   MRN:  [id number]       Assessment / Plan:  80 y.o.   male with pmh of HTN, CAD, HDL, recent PPM, COPD, CKD and dementia who presented to ED with SOB. CXR revealed increased interstitial and parenchymal pulmonary edema. Admitted with diagnosis of CHF vs PNA. She completed a 5-day course of levofloxacin and received IV lasix with clinical improvement.      SOB  Pulmonary edema, 2nd to acute on chronic syst chf and rvr afib/flutter  CAP, bacterial  Acute hypoxemic resp failure  CXR revealed increased interstitial and parenchymal pulmonary edema. VQ scan very low probability   echo: LVEF 40 %. Mild diffuse hypokinesis. Clinical improvement. Changed lasix to po. continue fluid restriction, daily weights. completed 5 days of Levofloxacin. CXR- improved aeration, but persistent interstitial changes B, reviewed images, doubt CHF   Still on o2, try to wean      Recurrent Afib/flutter  S/P PPM  Misplaced Atrial lead  HR is controlled at rest and increased up to 120s while transferring to his chair. Continue lopressor and amiodarone. Close monitor. Cardiology is following. Atrial lead revision done by Dr. Debra Giraldo on . Patient candidate for anticoagulation but will hold for now due to GI bleed and suspect fall risk. Still a little tachycardia, lopressor dose increased yesterday, ok per cards    Diarrhea- at risk for c diff, increasing leukocytosis,  s/p abx, added probiotics, send for fecal leuks only 0-4 and r/o c diff-p. Decreasing stools    Hypothyroid, new- tsh 23, low FT4, new dx, started synthroid 50 and recheck tsh in 4 weeks.     Melena/hematochezia  Patient with episode of melena/hematochezia , no further episodes since then. GI consulted, no procedure indicated at this time. Continue PPI.      CAD  Stable.  Continue Aspirin, Plavix and atorvastatin.      COPD, suspect mild acute on chronic exac  Acute hypoxemic resp failure  -  Continue with Arnuity ellipta and duoneb.   -added flutter valve and xopenex low dose scheduled to see if resp improves, watch HR  -wean o2  -cont nebs today, avoid IV/po steroids for now, cont inhaled     HTN  BP is adequate.      CKD4  Anemia, likely from renal failure  At baseline? Don't see prior labs, but stage 4 documented in prior notes (H&P 11/16)  Creat 2.3 today. Close monitor.       Hx TIA  - on plavix      Hx throat cancer      Dementia   - continue aricept      Depression / anxiety  - continue zoloft      Debility  PT/OT following. SNF at discharge.     18.5 - 24.9 Normal weight / Body mass index is 23.56 kg/(m^2).     Code status: Full  Prophylaxis: Lovenox on hold with gi bleed    Recommended Disposition: recs for snf. ..when ready, need to r/o c. diff         Subjective:     Chief Complaint / Reason for Physician Visit  sob    8/2 Patient denies any shortness of breath chest pain currently on nasal cannula denies any cough or sputum production. He is asking about possibly going home as he will not rehab.    8/3 pt denies sob, occ cough, no sputum, no cp, still on o2. We discussed that he needs rehab and he initially refused but after further discussion again with his daughter on the home, he agreed, but only short term. Had a long discussion with daughter. 8/3 pt says he is breathing fine and feels fine. No cough, no sob no cp. Still requiring o2.  ++ loose watery stools last night and today, multiple episodes. No abd pain, n/v.     8/4 pt feels \"fine\" get winded with any activity and drops sats with activity. Was off o2 when I came in and sats 84% although he denied sob. He denies cp or cough. No abd pain. Had 6 stools yesterday, but better today, 2 overnight and none this morning per nurse. Patient was evaluated at 11:15 AM      Discussed with RN events overnight.      Review of Systems:  Symptom Y/N Comments  Symptom Y/N Comments   Fever/Chills    Chest Pain n    Poor Appetite Edema     Cough n   Abdominal Pain n    Sputum    Joint Pain     SOB/REYEZ n   Pruritis/Rash     Nausea/vomit    Tolerating PT/OT     Diarrhea y better  Tolerating Diet     Constipation    Other       Could NOT obtain due to: confusion     Objective:     VITALS:   Last 24hrs VS reviewed since prior progress note. Most recent are:  Patient Vitals for the past 24 hrs:   Temp Pulse Resp BP SpO2   08/05/18 1126 98.3 °F (36.8 °C) (!) 109 (!) 36 108/70 96 %   08/05/18 0755 97.9 °F (36.6 °C) (!) 112 18 134/79 92 %   08/05/18 0752 - - - - 93 %   08/05/18 0421 98.1 °F (36.7 °C) (!) 108 18 126/77 96 %   08/04/18 2302 98.1 °F (36.7 °C) (!) 103 20 124/84 91 %   08/04/18 2248 - - - - 94 %   08/04/18 1954 98.1 °F (36.7 °C) 72 18 99/71 92 %   08/04/18 1628 - - - - 91 %   08/04/18 1534 98.2 °F (36.8 °C) (!) 103 20 113/58 94 %       Intake/Output Summary (Last 24 hours) at 08/05/18 1230  Last data filed at 08/05/18 1125   Gross per 24 hour   Intake              804 ml   Output              800 ml   Net                4 ml        PHYSICAL EXAM:  Patient is a frail man oriented to self and hospital on nasal cannula but no distress. Conjunctiva pink oropharynx mucous membranes a little tacky. Cardiovascular sounds regular on exam no obvious murmurs rubs gallops. Lungs clearer, no w/r/c.  Abdomen is benign bowel sounds present soft nontender extremities no clubbing cyanosis, no edema b    Reviewed most cyurrent lab test results and cultures  YES  Reviewed most current radiology test results   YES  Review and summation of old records today    NO  Reviewed patient's current orders and MAR    YES  PMH/SH reviewed - no change compared to H&P  ________________________________________________________________________  Care Plan discussed with:    Comments   Patient y    Family      RNy y    Care Manager y    Consultant                        Multidiciplinary team rounds were held today with , nursing, pharmacist and clinical coordinator. Patient's plan of care was discussed; medications were reviewed and discharge planning was addressed. ________________________________________________________________________  Total NON critical care TIME:   Minutes    Total CRITICAL CARE TIME Spent:   Minutes non procedure based      Comments   >50% of visit spent in counseling and coordination of care     ________________________________________________________________________  Shilpa Jalloh MD     Procedures: see electronic medical records for all procedures/Xrays and details which were not copied into this note but were reviewed prior to creation of Plan. LABS:  I reviewed today's most current labs and imaging studies.   Pertinent labs include:  Recent Labs      08/05/18   0117  08/04/18   0255  08/03/18   0308   WBC  13.4*  13.4*  12.7*   HGB  9.6*  9.9*  9.7*   HCT  33.2*  34.4*  33.9*   PLT  317  331  316     Recent Labs      08/05/18   0117  08/04/18   0255  08/03/18   0308   NA  145  144  147*   K  3.6  3.1*  4.0   CL  110*  108  112*   CO2  30  28  29   GLU  88  103*  95   BUN  26*  26*  27*   CREA  2.31*  2.17*  2.26*   CA  8.8  9.3  9.3   MG  1.9  2.1  2.1   PHOS  2.3*   --   2.6       Signed: Shilpa Jalloh MD

## 2018-08-05 NOTE — PROGRESS NOTES
Problem: Falls - Risk of  Goal: *Absence of Falls  Document Bony Fall Risk and appropriate interventions in the flowsheet.    Outcome: Progressing Towards Goal  Fall Risk Interventions:  Mobility Interventions: Bed/chair exit alarm, Communicate number of staff needed for ambulation/transfer, Mechanical lift, OT consult for ADLs, Patient to call before getting OOB, PT Consult for mobility concerns    Mentation Interventions: Adequate sleep, hydration, pain control, Bed/chair exit alarm, Door open when patient unattended, Evaluate medications/consider consulting pharmacy, Eyeglasses and hearing aids, Familiar objects from home, Increase mobility, More frequent rounding, Reorient patient, Room close to nurse's station, Self-releasing belt, Toileting rounds, Update white board    Medication Interventions: Bed/chair exit alarm, Evaluate medications/consider consulting pharmacy, Patient to call before getting OOB, Teach patient to arise slowly    Elimination Interventions: Bed/chair exit alarm, Call light in reach, Elevated toilet seat, Patient to call for help with toileting needs, Toilet paper/wipes in reach, Toileting schedule/hourly rounds

## 2018-08-05 NOTE — PROGRESS NOTES
Cardiology Progress Note  8/5/2018     Admit Date: 7/27/2018  Admit Diagnosis: SOB (shortness of breath) on exertion  CC: none currently  Assessment/Plan (as per Dr Vamshi Silva):      1.  Shortness of breath with documented hypoxia, unclear cause. Pneumonia vs CHF   2.  Status post recent dual chamber pacemaker placement. S/p atrial lead revision   3.  Coronary artery disease with remote non-ST elevation myocardial infarction. 4.  History of paroxysmal atrial fibrillation and sick sinus syndrome with recent pacemaker placement. Recurrent afib/ flutter  5.  Pulmonary hypertension with right heart dysfunction of uncertain cause. 6.  Mild dementia. 7.  Chronic renal insufficiency. 8.  Chronic anemia. 9.  Prior CVA. 10.  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 11. Recent melena and hematochezia  Will increase metoprolol to try and get better rate control. Otherwise cont current Rx     8/3: HR better with metoprolol 50 tid (amio @ 200 bid); On asa/plavix; no anticoag with GIB. On lasix 40 bid with stable Cr (2.3). Home when stable: need to see HR/O2 with ambulation (apparently not on O2 at home). 8/4: HR occ up & BP up; change metoprolol to 100 bid; Decrease lasix to qday. 8/5: Cont aflutter with HR OK overall; Occ Vpaced beats. BP stable. Cr 2.3. Ethel Leaks for d/c from cardiac standpoint. F/U with PCP and Dr Vamshi Silva. For other plans, see orders.   Hospital problem list   Active Hospital Problems    Diagnosis Date Noted    SOB (shortness of breath) on exertion 07/27/2018        Subjective: Kavitha Young reports   Chest pain X none  consistent with:  Non-cardiac CP         Atypical CP     None now  On going  Anginal CP     Dyspnea x none  at rest  with exertion         improved  unchanged  worse              PND X none  overnight       Orthopnea X none  improved  unchanged  worse   Presyncope X none  improved  unchanged  worse     Ambulated in hallway without symptoms   Yes   Ambulated in room without symptoms  Yes   Objective:    Physical Exam:  Overall VSSAF;    Visit Vitals    /79 (BP 1 Location: Left arm, BP Patient Position: At rest)    Pulse (!) 112    Temp 97.9 °F (36.6 °C)    Resp 18    Ht 5' 6\" (1.676 m)    Wt 63.6 kg (140 lb 3 oz)    SpO2 92%    BMI 22.63 kg/m2     Temp (24hrs), Av.2 °F (36.8 °C), Min:97.9 °F (36.6 °C), Max:98.9 °F (37.2 °C)    Patient Vitals for the past 8 hrs:   Pulse   18 0755 (!) 112   18 0421 (!) 108    Patient Vitals for the past 8 hrs:   Resp   18 0755 18   18 0421 18    Patient Vitals for the past 8 hrs:   BP   18 0755 134/79   18 0421 126/77          Intake/Output Summary (Last 24 hours) at 18 1109  Last data filed at 18 0916   Gross per 24 hour   Intake              684 ml   Output              750 ml   Net              -66 ml     General Appearance: Well developed, well nourished, no acute distress. Ears/Nose/Mouth/Throat:   Normal MM; anicteric. JVP: WNL   Resp:   CTA! .  Nl resp effort. Cardiovascular:  RRR, S1, S2 normal, no new murmur. No gallop or rub. Abdomen:   Soft, non-tender, bowel sounds are present. Extremities: No edema bilaterally. Skin:  Neuro: Warm and dry. A/O x3, grossly nonfocal   cath site intact w/o hematoma or new bruit; distal pulse unchanged  Yes   Data Review:     Telemetry independently reviewed  sinus x persistent afib/flutter. parox afib  NSVT     ECG independently reviewed  NSR  afib  no significant changes  NSST-Tw chgs   x no new ECG provided for review   Lab results reviewed as noted below. Current medications reviewed as noted below. No results for input(s): PH, PCO2, PO2 in the last 72 hours. No results for input(s): CPK, CKMB, CKNDX, TROIQ in the last 72 hours.   Recent Labs      18   0117  18   0255  18   0308   NA  145  144  147*   K  3.6  3.1*  4.0   CL  110*  108  112*   CO2  30  28  29   BUN  26*  26*  27*   CREA  2.31* 2.17*  2.26*   GFRAA  33*  35*  33*   GLU  88  103*  95   PHOS  2.3*   --   2.6   CA  8.8  9.3  9.3   WBC  13.4*  13.4*  12.7*   HGB  9.6*  9.9*  9.7*   HCT  33.2*  34.4*  33.9*   PLT  317  331  316     No results found for: CHOL, CHOLX, CHLST, CHOLV, HDL, LDL, LDLC, DLDLP, TGLX, TRIGL, TRIGP, CHHD, CHHDX  No results for input(s): SGOT, GPT, AP, TBIL, TP, ALB, GLOB, GGT, AML, LPSE in the last 72 hours. No lab exists for component: AMYP, HLPSE  No results for input(s): INR, PTP, APTT in the last 72 hours.     No lab exists for component: INREXT, INREXT   No components found for: GLPOC    Current Facility-Administered Medications   Medication Dose Route Frequency    metoprolol tartrate (LOPRESSOR) tablet 100 mg  100 mg Oral BID    metoprolol (LOPRESSOR) injection 5 mg  5 mg IntraVENous Q4H PRN    furosemide (LASIX) tablet 40 mg  40 mg Oral DAILY    lactobac ac& pc-s.therm-b.anim (VIRGIL Q/RISAQUAD)  1 Cap Oral DAILY    ipratropium (ATROVENT) 0.02 % nebulizer solution 0.5 mg  0.5 mg Nebulization QID RT    levalbuterol (XOPENEX) nebulizer soln 0.63 mg/3 mL  0.63 mg Nebulization QID RT    potassium chloride SR (KLOR-CON 10) tablet 10 mEq  10 mEq Oral DAILY    levothyroxine (SYNTHROID) tablet 50 mcg  50 mcg Oral 6am    pantoprazole (PROTONIX) tablet 40 mg  40 mg Oral ACB    sodium chloride (NS) flush 5-10 mL  5-10 mL IntraVENous Q8H    sodium chloride (NS) flush 5-10 mL  5-10 mL IntraVENous PRN    acetaminophen (TYLENOL) tablet 650 mg  650 mg Oral Q4H PRN    HYDROcodone-acetaminophen (NORCO) 5-325 mg per tablet 1 Tab  1 Tab Oral Q4H PRN    albuterol-ipratropium (DUO-NEB) 2.5 MG-0.5 MG/3 ML  3 mL Nebulization Q4H PRN    albuterol (PROVENTIL HFA, VENTOLIN HFA, PROAIR HFA) inhaler 2 Puff  2 Puff Inhalation Q6H PRN    amiodarone (CORDARONE) tablet 200 mg  200 mg Oral BID    fluticasone furoate (ARNUITY ELLIPTA) 100 mcg/puff  1 Puff Inhalation DAILY    finasteride (PROSCAR) tablet 5 mg  5 mg Oral DAILY    sertraline (ZOLOFT) tablet 25 mg  25 mg Oral QHS    donepezil (ARICEPT) tablet 10 mg  10 mg Oral QHS    ferrous sulfate tablet 325 mg  1 Tab Oral DAILY WITH BREAKFAST    clopidogrel (PLAVIX) tablet 75 mg  75 mg Oral DAILY    aspirin chewable tablet 81 mg  81 mg Oral DAILY    atorvastatin (LIPITOR) tablet 20 mg  20 mg Oral DAILY    ondansetron (ZOFRAN) injection 4 mg  4 mg IntraVENous Q4H PRN        Bandar Allred MD

## 2018-08-06 NOTE — PROGRESS NOTES
ADULT PROTOCOL: JET AEROSOL  REASSESSMENT    Patient  Kathy Jo     80 y.o.   male     8/6/2018  9:56 AM    Breath Sounds Pre Procedure: Right Breath Sounds: Diminished                               Left Breath Sounds: Diminished    Breath Sounds Post Procedure: Right Breath Sounds: Diminished                                 Left Breath Sounds: Diminished    Breathing pattern: Pre procedure Breathing Pattern: Regular          Post procedure Breathing Pattern: Regular    Heart Rate: Pre procedure Pulse: 104           Post procedure Pulse: 116    Resp Rate: Pre procedure Respirations: 20           Post procedure Respirations: 20            Cough: Pre procedure Cough: Non-productive               Post procedure Cough: Non-productive      Oxygen: O2 Device: Nasal cannula   Flow rate (L/min) 1 lpm     Changed: NO    SpO2: Pre procedure SpO2: 97 %   with oxygen              Post procedure SpO2: 97 %  with oxygen    Nebulizer Therapy: Current medications Aerosolized Medications: Ipratropium bromide, Xopenex      Changed: NO    Smoking History: former smoker    Problem List:   Patient Active Problem List   Diagnosis Code    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    Depression F32.9    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HCC) C14.0    Breast cancer in male ([facility] C46.5    GERD (gastroesophageal reflux disease) K21.9    HBP (high blood pressure) I10    Headache(784.0) R51    Dizziness and giddiness R42    Dysphagia R13.10    Personal history of colonic polyps Z86.010    Dementia of the Alzheimer's type, with late onset, uncomplicated H49.4, O48.32    Stenosis of both carotid arteries without infarction I65.23    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

## 2018-08-06 NOTE — PROGRESS NOTES
Cardiology Progress Note      8/6/2018 4:25 PM    Admit Date: 7/27/2018          Subjective: Hypotensive earlier today. Received fluid bolus. No other new c/o Has C diff         Visit Vitals    /79 (BP 1 Location: Right arm, BP Patient Position: Supine; Head of bed elevated (Comment degrees))  Comment (BP Patient Position): 30 degrees    Pulse 97    Temp 98.2 °F (36.8 °C)    Resp 20    Ht 5' 6\" (1.676 m)    Wt 63.6 kg (140 lb 5 oz)    SpO2 93%    BMI 22.65 kg/m2     08/04 1901 - 08/06 0700  In: 720 [P.O.:720]  Out: 1200 [Urine:1200]        Objective:      Physical Exam:  VS as above    Data Review:   Labs:      Hgb 9.5  BUN 30  Creat 2.6       Telemetry: afib R 110       Assessment:     1.  Shortness of breath with documented hypoxia, unclear cause. Pneumonia vs CHF - better   2.  Status post recent dual chamber pacemaker placement. S/p atrial lead revision   3.  Coronary artery disease with remote non-ST elevation myocardial infarction. 4.  History of paroxysmal atrial fibrillation and sick sinus syndrome with recent pacemaker placement. Recurrent afib/ flutter  5.  Pulmonary hypertension with right heart dysfunction of uncertain cause. 6.  Mild dementia. 7.  Chronic renal insufficiency. 8.  Chronic anemia. 9.  Prior CVA. 10.  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 11. Recent melena and hematochezia   12. c diff colitis     Plan: Cont current cardiac regimen. Would not hold metoprolol for low BP.  Suspect he may be intravasc dry

## 2018-08-06 NOTE — PROGRESS NOTES
CM was contacted by the [facility] admission Poncho Marroqiun [phone] in regards to check on the status of Pt's discharge. CM notified admission that Pt was currently on contact precaution. Accepting facility indicated that they would not be able to accept the Pt until C-diff is resolved due to facility not having private rooms available. CM will continue to follow-up with discharge planning.       Gil Quesada, [facility], [address] [place] [address]  [phone]

## 2018-08-06 NOTE — PROGRESS NOTES
0700: [facility]     1241: Orders received for 250 cc bolus NS due to low BP. Will continue to monitor. 1336: Bolus complete. /63. Will continue to monitor. 1900:   Bedside shift change report given to Richard Islas RN (oncoming nurse). Report included the following information SBAR, Kardex, Intake/Output, MAR and Recent Results. SHIFT SUMMARY:            [address] NURSING NOTE   Admission Date 7/27/2018   Admission Diagnosis SOB (shortness of breath) on exertion   Consults IP CONSULT TO GASTROENTEROLOGY      Cardiac Monitoring [x] Yes [] No      Purposeful Hourly Rounding [x] Yes    Bony Score Total Score: 3   Bony score 3 or > [x] Bed Alarm [] Avasys [] 1:1 sitter [] Patient refused (Signed refusal form in chart)   Bharathi Score Bharathi Score: 18   Bharathi score 14 or < [] PMT consult [] Wound Care consult    []  Specialty bed  [] Nutrition consult      Influenza Vaccine Received Flu Vaccine for Current Season (usually Sept-March): Not Flu Season           Oxygen needs? [] Room air Oxygen @  []1L    [x]2L    []3L   []4L    []5L   []6L via  NC   Chronic home O2 use?  [] Yes [] No  Perform O2 challenge test and document in progress note using [email or web address])      Last bowel movement Last Bowel Movement Date: 08/05/18      Urinary Catheter [REMOVED] Urinary Catheter 07/30/18-Indications for Use: Surgery  [REMOVED] Condom Catheter 07/30/18-Indications for Use:  (accidnetal removal)     [REMOVED] Condom Catheter 07/30/18-Urine Output (mL): 275 ml     LDAs               Peripheral IV 08/06/18 Right Antecubital (Active)   Site Assessment Clean, dry, & intact 8/6/2018  5:57 PM   Phlebitis Assessment 0 8/6/2018  5:57 PM   Infiltration Assessment 0 8/6/2018  5:57 PM   Dressing Status Clean, dry, & intact 8/6/2018  5:57 PM   Dressing Type Tape;Transparent 8/6/2018  5:57 PM   Hub Color/Line Status Blue;Flushed 8/6/2018  5:57 PM                         Readmission Risk Assessment Tool Score High Risk            22       Total Score        3 Has Seen PCP in Last 6 Months (Yes=3, No=0)    2 . Living with Significant Other. Assisted Living. LTAC. SNF. or   Rehab    3 Patient Length of Stay (>5 days = 3)    4 IP Visits Last 12 Months (1-3=4, 4=9, >4=11)    5 Pt.  Coverage (Medicare=5 , Medicaid, or Self-Pay=4)    5 Charlson Comorbidity Score (Age + Comorbid Conditions)       Expected Length of Stay 3d 7h   Actual Length of Stay 10

## 2018-08-06 NOTE — PROGRESS NOTES
08/06/18 0757   Vital Signs   Temp 98.2 °F (36.8 °C)   Temp Source Oral   Pulse (Heart Rate) (!) 115   Heart Rate Source Monitor   Cardiac Rhythm A Fib   Resp Rate 20   O2 Sat (%) 97 %   Level of Consciousness Alert   /79   MAP (Calculated) 94   BP 1 Method Automatic   BP 1 Location Left arm   BP Patient Position At rest   MEWS Score 3     MEWS 3 due to increased HR due to chronic Afib. MD aware. Will continue to monitor.

## 2018-08-06 NOTE — PROGRESS NOTES
Problem: Mobility Impaired (Adult and Pediatric)  Goal: *Acute Goals and Plan of Care (Insert Text)  Physical Therapy Goals  Initiated 8/1/2018  1. Patient will move from supine to sit and sit to supine , scoot up and down and roll side to side in bed with independence within 7 day(s). 2.  Patient will transfer from bed to chair and chair to bed with modified independence using the least restrictive device within 7 day(s). 3.  Patient will perform sit to stand with modified independence within 7 day(s). 4.  Patient will ambulate with supervision/set-up for 200 feet with the least restrictive device within 7 day(s). 5.  Patient will ascend/descend 5 stairs with 1 handrail(s) with supervision/set-up within 7 day(s). physical Therapy TREATMENT  Patient: Kavitha Young (70 y.o. male)  Date: 8/6/2018  Diagnosis: SOB (shortness of breath) on exertion <principal problem not specified>       Precautions: Fall (Pacemaker)  Chart, physical therapy assessment, plan of care and goals were reviewed. ASSESSMENT:  Pt received supine in bed on 1 L/min O2 and agreeable to PT intervention. Pt cleared by nursing for mobility. O2 sats assessed on RA during activity and remained 85-91% on RA. Bed mobility performed with CGA, increased time, and VCs to complete. Transfers performed with min A and VCs for safe hand placement. Pt with posterior and R lateral lean in sitting and standing this date, needing VCs and external support for safety and fall prevention. He ambulated 30 ft in room with min/CG A x 1 and RW support. SaO2 85% with activity on RA, therefore placed 1 L/min O2 on patient for further mobility and O2 sats remained >90%. He exhibits slow, shuffled gait with increased neck flexion, needing VCs to look forward and improved neck extension during ambulation. Pt was left sitting in bedside chair with all needs met, RN aware, chair alarm on, and VSS on 1 L/min O2 following therapy session.  Recommend pt discharge to SNF rehab to improve functional mobility and independence as pt is a fall risk and with decline from baseline mobility. Progression toward goals:  []    Improving appropriately and progressing toward goals  [x]    Improving slowly and progressing toward goals  []    Not making progress toward goals and plan of care will be adjusted     PLAN:  Patient continues to benefit from skilled intervention to address the above impairments. Continue treatment per established plan of care. Discharge Recommendations:  [facility]  Further Equipment Recommendations for Discharge:  TBD by rehab     SUBJECTIVE:   Patient stated I am using the bathroom.     OBJECTIVE DATA SUMMARY:   Critical Behavior:  Neurologic State: Alert  Orientation Level: Oriented to person  Cognition: Follows commands  Safety/Judgement: Fall prevention, Awareness of environment  Functional Mobility Training:  Bed Mobility:  Rolling: Contact guard assistance  Supine to Sit: Contact guard assistance     Scooting: Contact guard assistance        Transfers:  Sit to Stand: Minimum assistance  Stand to Sit: Minimum assistance        Bed to Chair: Minimum assistance                    Balance:  Sitting: Intact  Standing: Impaired; With support  Standing - Static: Good;Fair  Standing - Dynamic : Fair  Ambulation/Gait Training:  Distance (ft): 30 Feet (ft)  Assistive Device: Gait belt;Walker, rolling  Ambulation - Level of Assistance: Contact guard assistance;Minimal assistance;Assist x1;Additional time; Adaptive equipment        Gait Abnormalities: Decreased step clearance;Shuffling gait        Base of Support: Narrowed     Speed/Delia: Pace decreased (<100 feet/min)  Step Length: Left shortened;Right shortened    Pain:  Pain Scale 1: Numeric (0 - 10)  Pain Intensity 1: 0              Activity Tolerance:   Fair - SaO2 85-91% on RA and remained >90% on 1 L/min O2; pt with SOB during all activity; HR stable  Please refer to the flowsheet for vital signs taken during this treatment.   After treatment:   [x]    Patient left in no apparent distress sitting up in chair  []    Patient left in no apparent distress in bed  [x]    Call bell left within reach  [x]    Nursing notified  []    Caregiver present  [x]    Bed alarm activated    COMMUNICATION/COLLABORATION:   The patients plan of care was discussed with: Physical Therapist, Occupational Therapist and Registered Nurse    Mary Longoria, PT, DPT   Time Calculation: 33 mins

## 2018-08-06 NOTE — PROGRESS NOTES
Problem: Self Care Deficits Care Plan (Adult)  Goal: *Acute Goals and Plan of Care (Insert Text)  Occupational Therapy Goals  Initiated 8/1/2018  1. Patient will perform grooming at the sink with supervision/set-up within 7 day(s). 2.  Patient will perform bathing at the sink with moderate assistance  within 7 day(s). 3.  Patient will perform lower body dressing with moderate assistance  within 7 day(s). 4.  Patient will perform toilet transfers with minimal assistance/contact guard assist within 7 day(s). 5.  Patient will perform all aspects of toileting with supervision/set-up within 7 day(s). 6.  Patient will participate in upper extremity therapeutic exercise/activities with supervision/set-up for 5 minutes within 7 day(s). 7.  Patient will utilize energy conservation techniques during functional activities with verbal cues within 7 day(s). Occupational Therapy TREATMENT  Patient: Cam Griffin (36 y.o. male)  Date: 8/6/2018  Diagnosis: SOB (shortness of breath) on exertion <principal problem not specified>       Precautions: Fall (Pacemaker)  Chart, occupational therapy assessment, plan of care, and goals were reviewed. ASSESSMENT:  Verbal and physical cues needed for adherence for pacer precautions during ADL mobility and transfers. Pt tends to grunt to breathe with activity. Nursing requested walking O2 challenge today and pt will need O2 at discharge at this time. CGA for supine to sit. With attempt at EOB donning of socks pt had significant losses of balance with crossed leg technique. Pt was able to don one sock only and therapist donned other due to decreased balance. Min assist for sit to stand and for balance overall during activity using RW for support. Verbal cues needed for walker management. Performed toileting with min assist overall and during standing at the sink to wash hands pt has multiple losses of balance with min assist to correct with bilateral hands off walker. Continue to recommend SNF for rehab at discharge. Pulse Oximetry Assessment     92% at rest on room air  85% while ambulating on room air  97% while ambulating on 1LPM  Progression toward goals:  []       Improving appropriately and progressing toward goals  [x]       Improving slowly and progressing toward goals  []       Not making progress toward goals and plan of care will be adjusted     PLAN:  Patient continues to benefit from skilled intervention to address the above impairments. Continue treatment per established plan of care. Discharge Recommendations:  Skilled Nursing Facility  Further Equipment Recommendations for Discharge:  Rolling walker     SUBJECTIVE:   Patient stated I feel fine.     OBJECTIVE DATA SUMMARY:   Cognitive/Behavioral Status:  Neurologic State: Alert  Orientation Level: Oriented to person  Cognition: Follows commands  Perception: Appears intact  Perseveration: No perseveration noted  Safety/Judgement: Fall prevention; Awareness of environment    Functional Mobility and Transfers for ADLs:  Bed Mobility:  Rolling: Contact guard assistance  Supine to Sit: Contact guard assistance  Scooting: Contact guard assistance    Transfers:  Sit to Stand: Minimum assistance  Functional Transfers  Bathroom Mobility: Minimum assistance (with rolling walker for balance; verbal cues for safety)  Toilet Transfer : Minimum assistance  Bed to Chair: Minimum assistance    Balance:  Sitting: Intact  Standing: Impaired; With support  Standing - Static: Good;Fair  Standing - Dynamic : Fair    ADL Intervention:       Grooming  Washing Hands: Contact guard assistance (standing at sink with sway in standing)              Upper Body [address]: Minimum  assistance (seated with back support)    Lower Body Dressing Assistance  Socks: Maximum assistance (multiple losses of balance to the right and posterior )  Leg Crossed Method Used: Yes    Toileting  Bowel Hygiene: Minimum assistance  Clothing Management: Minimum assistance    Cognitive Retraining  Safety/Judgement: Fall prevention; Awareness of environment    Pain:  Pain Scale 1: Numeric (0 - 10)  Pain Intensity 1: 0              Activity Tolerance:     Please refer to the flowsheet for vital signs taken during this treatment.   After treatment:   [x] Patient left in no apparent distress sitting up in chair  [] Patient left in no apparent distress in bed  [x] Call bell left within reach  [x] Nursing notified  [x] Caregiver present  [x] Bed alarm activated    COMMUNICATION/COLLABORATION:   The patients plan of care was discussed with: Physical Therapist, Registered Nurse and patient    Kevon Webber OTR/L  Time Calculation: 37 mins

## 2018-08-06 NOTE — PROGRESS NOTES
Hospitalist Progress Note    NAME: Kj Rowell   :  1931   MRN:  [id number]       Assessment / Plan:  80 y.o.   male with pmh of HTN, CAD, HDL, recent PPM, COPD, CKD and dementia who presented to ED with SOB. CXR revealed increased interstitial and parenchymal pulmonary edema. Admitted with diagnosis of CHF vs PNA. She completed a 5-day course of levofloxacin and received IV lasix with clinical improvement.      SOB  Pulmonary edema, 2nd to acute on chronic syst chf and rvr afib/flutter  CAP, bacterial  Acute hypoxemic resp failure  CXR revealed increased interstitial and parenchymal pulmonary edema. VQ scan very low probability   echo: LVEF 40 %. Mild diffuse hypokinesis. Clinical improvement. Changed lasix to po. continue fluid restriction, daily weights. completed 5 days of Levofloxacin. CXR- improved aeration, but persistent interstitial changes B, reviewed images, doubt CHF   Still on o2, try to wean, improving slowly, but still needs o2 with activity      Recurrent Afib/flutter  S/P PPM  Misplaced Atrial lead  HR is controlled at rest and increased up to 120s while transferring to his chair. Continue lopressor and amiodarone. Close monitor. Cardiology is following. Atrial lead revision done by Dr. Jemal Multani on . Patient candidate for anticoagulation but will hold for now due to GI bleed and suspect fall risk. Better  tachycardia, lopressor dose increased 100 bid, but today drop in BP required small bolus and improved. Discussed meds with cards, he will review and adjust if needed. Diarrhea 2nd to c diff  - at risk for c diff, increasing leukocytosis,  s/p abx, added probiotics,  fecal leuks only 0-4 and r/o c diff-+. Decreasing stools on po vanc. Hypothyroid, new- tsh 23, low FT4, new dx, started synthroid 50 and recheck tsh in 4 weeks.     Melena/hematochezia  Patient with episode of melena/hematochezia , no further episodes since then.    GI consulted, no procedure indicated at this time. Continue PPI.      CAD  Stable. Continue Aspirin, Plavix and atorvastatin.      COPD, suspect mild acute on chronic exac  Acute hypoxemic resp failure  -  Continue with Arnuity ellipta and duoneb.   -added flutter valve and xopenex low dose scheduled to see if resp improves, watch HR  -wean o2  -cont nebs today, avoid IV/po steroids for now, cont inhaled     HTN  BP is adequate.      CKD4  Anemia, likely from renal failure  At baseline? Don't see prior labs, but stage 4 documented in prior notes (H&P 11/16)  Creat 2.3 today. Close monitor.       Hx TIA  - on plavix      Hx throat cancer      Dementia   - continue aricept      Depression / anxiety  - continue zoloft      Debility  PT/OT following. SNF at discharge.     18.5 - 24.9 Normal weight / Body mass index is 23.56 kg/(m^2).     Code status: Full  Prophylaxis: Lovenox on hold with gi bleed    Recommended Disposition: recs for snf. Ready for dc, but CM reports that snf will not accept him with +c.diff. I asked her to look into other facilities. Subjective:     Chief Complaint / Reason for Physician Visit  sob    8/2 Patient denies any shortness of breath chest pain currently on nasal cannula denies any cough or sputum production. He is asking about possibly going home as he will not rehab.    8/3 pt denies sob, occ cough, no sputum, no cp, still on o2. We discussed that he needs rehab and he initially refused but after further discussion again with his daughter on the home, he agreed, but only short term. Had a long discussion with daughter. 8/4 pt says he is breathing fine and feels fine. No cough, no sob no cp. Still requiring o2.  ++ loose watery stools last night and today, multiple episodes. No abd pain, n/v.     8/5 pt feels \"fine\" get winded with any activity and drops sats with activity. Was off o2 when I came in and sats 84% although he denied sob. He denies cp or cough. No abd pain.  Had 6 stools yesterday, but better today, 2 overnight and none this morning per nurse. 8/6 pt feels well, BP dropped 80s around 11am, asym got all his am meds. He overall feels better and diarrhea much improved, no abd pain. He feels ready for dc to SNF. No cough. denies sob, but requiring o2 with activity, dropped to 85%, low 90%s at rest on RA. Patient was evaluated at noon      Discussed with RN events overnight. Review of Systems:  Symptom Y/N Comments  Symptom Y/N Comments   Fever/Chills    Chest Pain n    Poor Appetite    Edema     Cough n   Abdominal Pain n    Sputum    Joint Pain     SOB/REYEZ n   Pruritis/Rash     Nausea/vomit    Tolerating PT/OT     Diarrhea y better  Tolerating Diet     Constipation    Other       Could NOT obtain due to: confusion     Objective:     VITALS:   Last 24hrs VS reviewed since prior progress note. Most recent are:  Patient Vitals for the past 24 hrs:   Temp Pulse Resp BP SpO2   08/06/18 1532 98.2 °F (36.8 °C) 97 20 101/79 93 %   08/06/18 1340 - - - - 94 %   08/06/18 1335 - - - 112/63 -   08/06/18 1217 - - - (!) 86/60 -   08/06/18 1157 - (!) 107 - (!) 80/59 -   08/06/18 1135 98.2 °F (36.8 °C) 77 25 (!) 87/61 91 %   08/06/18 0827 - - - - 97 %   08/06/18 0806 - 99 - - -   08/06/18 0757 98.2 °F (36.8 °C) (!) 115 20 123/79 97 %   08/06/18 0739 - - - - 99 %   08/06/18 0322 98.3 °F (36.8 °C) 96 20 124/77 94 %   08/05/18 2225 98.1 °F (36.7 °C) 95 22 121/80 95 %   08/05/18 2128 - - - - 93 %   08/05/18 1920 98.7 °F (37.1 °C) (!) 108 18 117/65 93 %       Intake/Output Summary (Last 24 hours) at 08/06/18 1542  Last data filed at 08/06/18 1507   Gross per 24 hour   Intake              480 ml   Output              800 ml   Net             -320 ml        PHYSICAL EXAM:  Patient is a frail man oriented to self and hospital on nasal cannula but no distress. Conjunctiva pink oropharynx mucous membranes a little tacky. Cardiovascular sounds regular on exam no obvious murmurs rubs gallops.  Lungs clear, no w/r/c. Abdomen is benign bowel sounds present soft nontender extremities no clubbing cyanosis, no edema b    Reviewed most cyurrent lab test results and cultures  YES  Reviewed most current radiology test results   YES  Review and summation of old records today    NO  Reviewed patient's current orders and MAR    YES  PMH/SH reviewed - no change compared to H&P  ________________________________________________________________________  Care Plan discussed with:    Comments   Patient y    Family      RNy y    Care Manager y    Consultant  y cards                     Multidiciplinary team rounds were held today with , nursing, pharmacist and clinical coordinator. Patient's plan of care was discussed; medications were reviewed and discharge planning was addressed. ________________________________________________________________________  Total NON critical care TIME:   Minutes    Total CRITICAL CARE TIME Spent:   Minutes non procedure based      Comments   >50% of visit spent in counseling and coordination of care     ________________________________________________________________________  Terence Paget, MD     Procedures: see electronic medical records for all procedures/Xrays and details which were not copied into this note but were reviewed prior to creation of Plan. LABS:  I reviewed today's most current labs and imaging studies.   Pertinent labs include:  Recent Labs      08/06/18 0112 08/05/18 0117 08/04/18   0255   WBC  13.9*  13.4*  13.4*   HGB  9.5*  9.6*  9.9*   HCT  33.0*  33.2*  34.4*   PLT  304  317  331     Recent Labs      08/06/18 0112 08/05/18   0117  08/04/18   0255   NA  141  145  144   K  4.0  3.6  3.1*   CL  107  110*  108   CO2  27  30  28   GLU  83  88  103*   BUN  30*  26*  26*   CREA  2.57*  2.31*  2.17*   CA  8.7  8.8  9.3   MG   --   1.9  2.1   PHOS   --   2.3*   --        Signed: Terence Paget, MD

## 2018-08-06 NOTE — PROGRESS NOTES
08/06/18 1135   Vital Signs   Temp 98.2 °F (36.8 °C)   Temp Source Oral   Pulse (Heart Rate) 77   Heart Rate Source Monitor   Resp Rate 25   O2 Sat (%) 91 %   Level of Consciousness Alert   BP (!) 87/61  (notified rn)   MAP (Calculated) 70   BP 1 Method Automatic   BP 1 Location Left arm   BP Patient Position Sitting   MEWS Score 3     MEWS 3 due to low BP. Pt asymptomatic. MD informed. Orders received to administer 250 cc bolus and then recheck pressure. Will continue to monitor.

## 2018-08-06 NOTE — PROGRESS NOTES
Pulse Oximetry Assessment    92% at rest on room air  85% while ambulating on room air  97% while ambulating on 1LPM

## 2018-08-07 NOTE — PROGRESS NOTES
Problem: Mobility Impaired (Adult and Pediatric)  Goal: *Acute Goals and Plan of Care (Insert Text)  Physical Therapy Goals  Initiated 8/1/2018  1. Patient will move from supine to sit and sit to supine , scoot up and down and roll side to side in bed with independence within 7 day(s). 2.  Patient will transfer from bed to chair and chair to bed with modified independence using the least restrictive device within 7 day(s). 3.  Patient will perform sit to stand with modified independence within 7 day(s). 4.  Patient will ambulate with supervision/set-up for 200 feet with the least restrictive device within 7 day(s). 5.  Patient will ascend/descend 5 stairs with 1 handrail(s) with supervision/set-up within 7 day(s). physical Therapy TREATMENT  Patient: Gabrielle Noel (66 y.o. male)  Date: 8/7/2018  Diagnosis: SOB (shortness of breath) on exertion <principal problem not specified>       Precautions: Fall (Pacemaker)  Chart, physical therapy assessment, plan of care and goals were reviewed. ASSESSMENT:  Pt received supine in bed and agreeable to PT intervention. Pt cleared by nursing for mobility. VS assessed during therapy session and noted below for [id number]. All functional mobility performed with CGA/min A x 1-2, including bed mobility, transfers, and gait training. Pt with decline in BP from supine>standing, however asymptomatic, therefore pt ambulated to the bathroom with min-CGA and RW support. He stood at sink with CGA and performed ADLs, noting improvements in posterior lean. He did demonstrate posterior trunk lean with hips anteriorly protruded, however not full body posterior lean with LOB. He ambulated back to EOB and BP noted to be 86/52, therefore was returned back to bed. Pt asymptomatic throughout. BP improved once supine. He remained on 2 L/min O2 with SaO2 91-95% with activity. Pt was left supine in bed with all needs met, RN aware, and bed alarm on following therapy session.  Recommend pt discharge to SNF rehab to improve functional mobility and independence as pt is a fall risk and with decline from baseline mobility. Patient Vitals for the past 4 hrs:   Temp Pulse Resp BP SpO2   08/07/18 1456 98.5 °F (36.9 °C) (!) 103 20 110/64 92 %   08/07/18 1433 - 93 - 116/68 -   08/07/18 1431 - 90 - 94/45 95 %   08/07/18 1428 - (!) 112 - (!) 86/52 -   08/07/18 1425 - (!) 106 - 101/76 -   08/07/18 1421 - 92 - 122/71 91 %   08/07/18 1418 - 98 - 129/82 91 %   08/07/18 1313 - - - - 98 %       Progression toward goals:  []    Improving appropriately and progressing toward goals  [x]    Improving slowly and progressing toward goals  []    Not making progress toward goals and plan of care will be adjusted     PLAN:  Patient continues to benefit from skilled intervention to address the above impairments. Continue treatment per established plan of care. Discharge Recommendations:  Skilled Nursing Facility  Further Equipment Recommendations for Discharge:  TBD by rehab     SUBJECTIVE:   Patient stated I want some jello.     OBJECTIVE DATA SUMMARY:   Critical Behavior:  Neurologic State: Alert  Orientation Level: Oriented X4  Cognition: Follows commands  Safety/Judgement: Awareness of environment, Fall prevention  Functional Mobility Training:  Bed Mobility:  Rolling: Contact guard assistance  Supine to Sit: Contact guard assistance     Scooting: Contact guard assistance        Transfers:  Sit to Stand: Contact guard assistance; Additional time  Stand to Sit: Contact guard assistance                             Balance:  Sitting: Intact  Standing: Impaired  Standing - Static: Constant support;Good;Fair  Standing - Dynamic : Fair  Ambulation/Gait Training:  Distance (ft): 20 Feet (ft)  Assistive Device: Gait belt;Walker, rolling  Ambulation - Level of Assistance: Contact guard assistance;Minimal assistance;Assist x1;Additional time; Adaptive equipment        Gait Abnormalities: Decreased step clearance;Shuffling gait Base of Support: Narrowed     Speed/Delia: Pace decreased (<100 feet/min); Shuffled  Step Length: Left shortened;Right shortened    Pain:  Pain Scale 1: Numeric (0 - 10)  Pain Intensity 1: 0              Activity Tolerance:   Fair/poor - asymptomatic orthostatic hypotension  Please refer to the flowsheet for vital signs taken during this treatment.   After treatment:   []    Patient left in no apparent distress sitting up in chair  [x]    Patient left in no apparent distress in bed  [x]    Call bell left within reach  [x]    Nursing notified  []    Caregiver present  [x]    Bed alarm activated    COMMUNICATION/COLLABORATION:   The patients plan of care was discussed with: Physical Therapist, Occupational Therapist and Registered Nurse    Suni Ross, PT, DPT   Time Calculation: 24 mins

## 2018-08-07 NOTE — PROGRESS NOTES
Hospitalist Progress Note    NAME: Guy Trujillo   :  1931   MRN:  [id number]       Assessment / Plan:  80 y.o.   male with pmh of HTN, CAD, HDL, recent PPM, COPD, CKD and dementia who presented to ED with SOB. CXR revealed increased interstitial and parenchymal pulmonary edema. Admitted with diagnosis of CHF vs PNA. She completed a 5-day course of levofloxacin and received IV lasix with clinical improvement.       SOB  Pulmonary edema, 2nd to acute on chronic syst chf and rvr afib/flutter  CAP, bacterial   Acute hypoxemic resp failure  CXR revealed increased interstitial and parenchymal pulmonary edema. VQ scan very low probability   echo: LVEF 40 %. Mild diffuse hypokinesis. Clinical improvement. Changed lasix to po. continue fluid restriction, daily weights. completed 5 days of Levofloxacin. CXR- improved aeration, but persistent interstitial changes B, reviewed images, doubt CHF   Still on o2, try to wean, improving slowly, but still needs o2 with activity      Recurrent Afib/flutter  S/P PPM  Misplaced Atrial lead  HR remains elevated  Cont' amiodarone. Metoprolol increased at 100mg BID. However pt is orthostatic during PT session. Will discuss with cardiology in regards to this. Cardiology is following. Atrial lead revision done by Dr. Pancho Brito on . Patient candidate for anticoagulation but will hold for now due to GI bleed and suspect fall risk. ALLAN hose and los dose midodrine for now to help with orthostasis    Diarrhea 2nd to c diff  - at risk for c diff, increasing leukocytosis,  s/p abx, added probiotics,  fecal leuks only 0-4 and r/o c diff-+. Decreasing stools on po vanc. Hypothyroid, new  TSH 23, low FT4, new dx, started synthroid 50 and recheck tsh in 4 weeks.     Melena/hematochezia  Patient with episode of melena/hematochezia , no further episodes since then. GI consulted, no procedure indicated at this time. Continue PPI.      CAD  Stable. Continue Aspirin, Plavix and atorvastatin.      COPD, suspect mild acute on chronic exac  Acute hypoxemic resp failure  -  Continue with Arnuity ellipta and duoneb.   -added flutter valve and xopenex low dose scheduled to see if resp improves, watch HR  -cont nebs today, avoid IV/po steroids for now, cont inhaled     HTN  BP is adequate.      CKD4  Anemia, likely from renal failure  At baseline? Don't see prior labs, but stage 4 documented in prior notes (H&P 11/16)  Creat 2.3 today. Close monitor.        Hx TIA  On plavix      Hx throat cancer      Dementia   Continue aricept      Depression / anxiety  Continue zoloft      Debility  PT/OT following. SNF at discharge.     18.5 - 24.9 Normal weight / Body mass index is 23.56 kg/(m^2).     Code status: Full  Prophylaxis: Lovenox on hold with gi bleed  Recommended Disposition: recs for snf. Ready for dc, but CM reports that snf will not accept him with +c.diff. Subjective:     Chief Complaint / Reason for Physician Visit  sob  Pt seen at bedside. He has no complaints. He was just seen by PT, was hypotensive during PT. He remains asymptomatic    Discussed with RN events overnight. Review of Systems:  Symptom Y/N Comments  Symptom Y/N Comments   Fever/Chills    Chest Pain n    Poor Appetite    Edema     Cough n   Abdominal Pain n    Sputum    Joint Pain     SOB/REYEZ n   Pruritis/Rash     Nausea/vomit    Tolerating PT/OT     Diarrhea y better  Tolerating Diet     Constipation    Other       Could NOT obtain due to: confusion     Objective:     VITALS:   Last 24hrs VS reviewed since prior progress note.  Most recent are:  Patient Vitals for the past 24 hrs:   Temp Pulse Resp BP SpO2   08/07/18 1456 98.5 °F (36.9 °C) (!) 103 20 110/64 92 %   08/07/18 1433 - 93 - 116/68 -   08/07/18 1431 - 90 - 94/45 95 %   08/07/18 1428 - (!) 112 - (!) 86/52 -   08/07/18 1425 - (!) 106 - 101/76 -   08/07/18 1421 - 92 - 122/71 91 %   08/07/18 1418 - 98 - 129/82 91 %   08/07/18 1313 - - - - 98 %   08/07/18 1115 98.5 °F (36.9 °C) 76 20 111/64 99 %   08/07/18 1017 98.5 °F (36.9 °C) (!) 105 20 130/78 98 %   08/07/18 0828 98.3 °F (36.8 °C) (!) 117 24 132/76 98 %   08/07/18 0723 - - - - 96 %   08/07/18 0508 97.8 °F (36.6 °C) (!) 109 24 135/74 100 %   08/06/18 2241 98.2 °F (36.8 °C) (!) 106 24 111/68 98 %   08/06/18 2133 - - - - 96 %   08/06/18 1946 98.2 °F (36.8 °C) 86 20 110/58 98 %       Intake/Output Summary (Last 24 hours) at 08/07/18 1558  Last data filed at 08/07/18 1458   Gross per 24 hour   Intake              420 ml   Output              675 ml   Net             -255 ml        PHYSICAL EXAM:  PHYSICAL EXAM:  General: WD, WN. Alert, cooperative, no acute distress    EENT:  EOMI. Anicteric sclerae. MMM  Resp:  CTA bilaterally, no wheezing or rales. No accessory muscle use  CV:  Regular  rhythm,  No edema  GI:  Soft, Non distended, Non tender.  +Bowel sounds  Neurologic:  Alert and oriented X 3, normal speech  Psych:   Not anxious nor agitated  Skin:  No rashes. No jaundice    Reviewed most cyurrent lab test results and cultures  YES  Reviewed most current radiology test results   YES  Review and summation of old records today    NO  Reviewed patient's current orders and MAR    YES  PMH/SH reviewed - no change compared to H&P  ________________________________________________________________________  Care Plan discussed with:    Comments   Patient y    Family      RNy y    Care Manager y    Consultant                        Multidiciplinary team rounds were held today with , nursing, pharmacist and clinical coordinator. Patient's plan of care was discussed; medications were reviewed and discharge planning was addressed.      ________________________________________________________________________  Total NON critical care TIME:  35 Minutes    Total CRITICAL CARE TIME Spent:   Minutes non procedure based      Comments   >50% of visit spent in counseling and coordination of care ________________________________________________________________________  Pan Obrien MD     Procedures: see electronic medical records for all procedures/Xrays and details which were not copied into this note but were reviewed prior to creation of Plan. LABS:  I reviewed today's most current labs and imaging studies.   Pertinent labs include:  Recent Labs      08/07/18 0516 08/06/18 0112  08/05/18   0117   WBC  11.2*  13.9*  13.4*   HGB  8.9*  9.5*  9.6*   HCT  30.3*  33.0*  33.2*   PLT  297  304  317     Recent Labs      08/07/18 0516 08/06/18 0112  08/05/18   0117   NA  141  141  145   K  3.5  4.0  3.6   CL  106  107  110*   CO2  28  27  30   GLU  78  83  88   BUN  30*  30*  26*   CREA  2.45*  2.57*  2.31*   CA  8.9  8.7  8.8   MG   --    --   1.9   PHOS   --    --   2.3*       Signed: Pan Obrien MD

## 2018-08-07 NOTE — PROGRESS NOTES
0700: Bedside report received from Ramana Gognora RN.     [phone]: Pt had a large liquid bowel movement. 1900:   Bedside shift change report given to [facility], RN (oncoming nurse). Report included the following information SBAR, Kardex, Intake/Output, MAR, Recent Results and Cardiac Rhythm NSR.     SHIFT SUMMARY:            [address] NURSING NOTE   Admission Date 7/27/2018   Admission Diagnosis SOB (shortness of breath) on exertion   Consults IP CONSULT TO GASTROENTEROLOGY      Cardiac Monitoring [x] Yes [] No      Purposeful Hourly Rounding [x] Yes    Bony Score Total Score: 3   Bony score 3 or > [x] Bed Alarm [] Avasys [] 1:1 sitter [] Patient refused (Signed refusal form in chart)   Bharathi Score Bharathi Score: 17   Bharathi score 14 or < [] PMT consult [] Wound Care consult    []  Specialty bed  [] Nutrition consult      Influenza Vaccine Received Flu Vaccine for Current Season (usually Sept-March): Not Flu Season           Oxygen needs? [] Room air Oxygen @  []1L    [x]2L    []3L   []4L    []5L   []6L via  NC   Chronic home O2 use?  [] Yes [x] No  Perform O2 challenge test and document in progress note using [employer])      Last bowel movement Last Bowel Movement Date: 08/07/18      Urinary Catheter [REMOVED] Urinary Catheter 07/30/18-Indications for Use: Surgery  [REMOVED] Condom Catheter 07/30/18-Indications for Use:  (accidnetal removal)     [REMOVED] Condom Catheter 07/30/18-Urine Output (mL): 275 ml     LDAs               Peripheral IV 08/06/18 Right Antecubital (Active)   Site Assessment Clean, dry, & intact 8/7/2018  3:58 PM   Phlebitis Assessment 0 8/7/2018  3:58 PM   Infiltration Assessment 0 8/7/2018  3:58 PM   Dressing Status Clean, dry, & intact 8/7/2018  3:58 PM   Dressing Type Transparent;Tape 8/7/2018  3:58 PM   Hub Color/Line Status Blue;Flushed 8/7/2018  3:58 PM                         Readmission Risk Assessment Tool Score High Risk            22       Total Score        3 Has Seen PCP in Last 6 Months (Yes=3, No=0)    2 . Living with Significant Other. Assisted Living. LTAC. SNF. or   Rehab    3 Patient Length of Stay (>5 days = 3)    4 IP Visits Last 12 Months (1-3=4, 4=9, >4=11)    5 Pt.  Coverage (Medicare=5 , Medicaid, or Self-Pay=4)    5 Charlson Comorbidity Score (Age + Comorbid Conditions)       Expected Length of Stay 3d 7h   Actual Length of Stay 11

## 2018-08-07 NOTE — PROGRESS NOTES
Problem: Self Care Deficits Care Plan (Adult)  Goal: *Acute Goals and Plan of Care (Insert Text)  Occupational Therapy Goals  Initiated 2018  1. Patient will perform grooming at the sink with supervision/set-up within 7 day(s). 2.  Patient will perform bathing at the sink with moderate assistance  within 7 day(s). 3.  Patient will perform lower body dressing with moderate assistance  within 7 day(s). 4.  Patient will perform toilet transfers with minimal assistance/contact guard assist within 7 day(s). 5.  Patient will perform all aspects of toileting with supervision/set-up within 7 day(s). 6.  Patient will participate in upper extremity therapeutic exercise/activities with supervision/set-up for 5 minutes within 7 day(s). 7.  Patient will utilize energy conservation techniques during functional activities with verbal cues within 7 day(s). Occupational Therapy TREATMENT  Patient: Nik Cooper (43 y.o. male)  Date: 2018  Diagnosis: SOB (shortness of breath) on exertion <principal problem not specified>       Precautions: Fall (Pacemaker)  Chart, occupational therapy assessment, plan of care, and goals were reviewed. ASSESSMENT:  Pt was supine upon arrival.  Pt was on 2 liters NC this session and O2 sat was 90-93%. Pt was orthostatic this session but was completely asymptomatic. CGA for bed mobility. Min assist overall for balance for mobility to bathroom. Stood at sink with forward pelvis and posterior upper shoulders. When correcting posture with manual cues pt had LOB. Performed grooming standing with CGA initially but digressed to min assist for balance with bilateral hands washing face standing. Returned to edge of bed to take BP and Bp was 86/52. Returned pt back to supine and Bp started to improve. Nurse and MD notified of orthostasis. Continue to recommend SNF for rehab at discharge.     Supine: 129/82  Seated edge of bed 122/71  Standin/76:    Vitals:    18 1428 08/07/18 1431 08/07/18 1433 08/07/18 1456   BP: (!) 86/52 94/45 116/68 110/64   BP 1 Location: Left arm Left arm Left arm Left arm   BP Patient Position: Standing Supine  Comment: HOB flat Supine  Comment: HOB elevated At rest   Pulse: (!) 112 90 93 (!) 103  Comment: RN Notified   Resp:    20   Temp:    98.5 °F (36.9 °C)   SpO2:  95%  92%   Weight:       Height:           Progression toward goals:  []       Improving appropriately and progressing toward goals  [x]       Improving slowly and progressing toward goals  []       Not making progress toward goals and plan of care will be adjusted     PLAN:  Patient continues to benefit from skilled intervention to address the above impairments. Continue treatment per established plan of care. Discharge Recommendations:  [facility]  Further Equipment Recommendations for Discharge:  TBD     SUBJECTIVE:   Patient stated .    OBJECTIVE DATA SUMMARY:   Cognitive/Behavioral Status:  Neurologic State: Alert  Orientation Level: Oriented X4  Cognition: Follows commands  Perception: Appears intact  Perseveration: No perseveration noted  Safety/Judgement: Awareness of environment; Fall prevention    Functional Mobility and Transfers for ADLs:  Bed Mobility:  Rolling: Contact guard assistance  Supine to Sit: Contact guard assistance  Scooting: Contact guard assistance    Transfers:  Sit to Stand: Contact guard assistance; Additional time  Functional Transfers  Bathroom Mobility: Minimum assistance (with rolling walker)       Balance:  Standing - Static: Constant support;Good;Fair  Standing - Dynamic : Fair    ADL Intervention:       Grooming  Washing Face: Minimum assistance (for posterior loss of balance standing)  Washing Hands: Contact guard assistance (standing at sink)    Cognitive Retraining  Safety/Judgement: Awareness of environment; Fall prevention      Pain:  Pain Scale 1: Numeric (0 - 10)  Pain Intensity 1: 0              Activity Tolerance:     Please refer to the flowsheet for vital signs taken during this treatment.   After treatment:   [] Patient left in no apparent distress sitting up in chair  [x] Patient left in no apparent distress in bed  [x] Call bell left within reach  [x] Nursing notified  [] Caregiver present  [] Bed alarm activated    COMMUNICATION/COLLABORATION:   The patients plan of care was discussed with: Physical Therapist, Registered Nurse and patient    VASHTI Kinney/L  Time Calculation: 15 mins

## 2018-08-07 NOTE — PROGRESS NOTES
Bedside and Verbal shift change report given to Peyman Rios RN (oncoming nurse). Report included the following information SBAR, Kardex, Intake/Output, MAR, Accordion, Recent Results and Cardiac Rhythm A-fib. SHIFT SUMMARY:            [address] NURSING NOTE   Admission Date 7/27/2018   Admission Diagnosis SOB (shortness of breath) on exertion   Consults IP CONSULT TO GASTROENTEROLOGY      Cardiac Monitoring [x] Yes [] No      Purposeful Hourly Rounding [x] Yes    Bony Score Total Score: 3   Bony score 3 or > [x] Bed Alarm [] Avasys [] 1:1 sitter [] Patient refused (Signed refusal form in chart)   Bharathi Score Bharathi Score: 17   Bharathi score 14 or < [] PMT consult [] Wound Care consult    []  Specialty bed  [] Nutrition consult      Influenza Vaccine Received Flu Vaccine for Current Season (usually Sept-March): Not Flu Season           Oxygen needs? [] Room air Oxygen @  []1L    [x]2L    []3L   []4L    []5L   []6L via  NC   Chronic home O2 use? [] Yes [x] No  Perform O2 challenge test and document in progress note using [employer] (.[employer])      Last bowel movement Last Bowel Movement Date: 08/05/18      Urinary Catheter [REMOVED] Urinary Catheter 07/30/18-Indications for Use: Surgery  [REMOVED] Condom Catheter 07/30/18-Indications for Use:  (accidnetal removal)     [REMOVED] Condom Catheter 07/30/18-Urine Output (mL): 275 ml     LDAs               Peripheral IV 08/06/18 Right Antecubital (Active)   Site Assessment Clean, dry, & intact 8/7/2018  5:08 AM   Phlebitis Assessment 0 8/7/2018  5:08 AM   Infiltration Assessment 0 8/7/2018  5:08 AM   Dressing Status Clean, dry, & intact 8/7/2018  5:08 AM   Dressing Type Tape;Transparent 8/7/2018  5:08 AM   Hub Color/Line Status Blue;Capped 8/7/2018  5:08 AM                         Readmission Risk Assessment Tool Score High Risk            22       Total Score        3 Has Seen PCP in Last 6 Months (Yes=3, No=0)    2 . Living with Significant Other.  Assisted Living. LTAC. SNF. or   Rehab    3 Patient Length of Stay (>5 days = 3)    4 IP Visits Last 12 Months (1-3=4, 4=9, >4=11)    5 Pt.  Coverage (Medicare=5 , Medicaid, or Self-Pay=4)    5 Charlson Comorbidity Score (Age + Comorbid Conditions)       Expected Length of Stay 3d 7h   Actual Length of Stay 11

## 2018-08-07 NOTE — PROGRESS NOTES
ADULT PROTOCOL: JET AEROSOL  REASSESSMENT    Patient  Emy Mathur     80 y.o.   male     8/7/2018  11:48 AM    Breath Sounds Pre Procedure: Right Breath Sounds: Diminished                               Left Breath Sounds: Diminished    Breath Sounds Post Procedure: Right Breath Sounds: Diminished                                 Left Breath Sounds: Diminished    Breathing pattern: Pre procedure Breathing Pattern: Regular          Post procedure Breathing Pattern: Regular    Heart Rate: Pre procedure Pulse: 109           Post procedure Pulse: 74    Resp Rate: Pre procedure Respirations: 18           Post procedure Respirations: 18      Cough: Pre procedure Cough: Congested, Non-productive               Post procedure Cough: Non-productive    Oxygen: 2L/NC     Changed: NO    SpO2: Pre procedure SpO2: 96 %                 Post procedure SpO2: 98 %      Nebulizer Therapy: Current medications Aerosolized Medications: Ipratropium bromide, Xopenex      Changed: NO      Problem List:   Patient Active Problem List   Diagnosis Code    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) I49.5    SOB (shortness of breath) on exertion R06.02       Respiratory Therapist: Hilario Ponce RT

## 2018-08-07 NOTE — PROGRESS NOTES
Cardiology Progress Note      8/7/2018 8:45 AM    Admit Date: 7/27/2018          Subjective: No major changes. Remains in afib          Visit Vitals    /76 (BP 1 Location: Left arm, BP Patient Position: Sitting)    Pulse (!) 117    Temp 98.3 °F (36.8 °C)    Resp 24    Ht 5' 6\" (1.676 m)    Wt 64 kg (141 lb 3.2 oz)    SpO2 98%    BMI 22.79 kg/m2     08/05 1901 - 08/07 0700  In: 720 [P.O.:720]  Out: 825 [Urine:825]        Objective:      Physical Exam:  VS as above  Incision looks good    Data Review:   Labs:    BUN 30  Creat 2.45  K 3.5   Hgb 8.9     Telemetry: afib R 80-90      Assessment:     1.  Shortness of breath with documented hypoxia, unclear cause. Pneumonia vs CHF - better   2.  Status post recent dual chamber pacemaker placement. S/p atrial lead revision   3.  Coronary artery disease with remote non-ST elevation myocardial infarction. 4.  History of paroxysmal atrial fibrillation and sick sinus syndrome with recent pacemaker placement. Recurrent afib/ flutter  5.  Pulmonary hypertension with right heart dysfunction of uncertain cause. 6.  Mild dementia. 7.  Chronic renal insufficiency. 8.  Chronic anemia. 9.  Prior CVA. 10.  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 11. Recent melena and hematochezia   12. c diff colitis     Plan: Cont current Rx. Will see back on request. Needs f/u for pacer check 2-3 weeks.

## 2018-08-07 NOTE — PROGRESS NOTES
CM spoke with Fidencio guillen to inquire about the policy for accepting Pts on contact precaution. CM was informed that the Pt would have to complete treatment before the facility can take Pt. CM spoke with Pt's daughter to discuss discharge. Daughter was informed that the accepting facility would not be able to take Pt until Pt has competed treatment. Daughter was receptive and requested if CM could verify if the [facility] have beds available. Daughter also informed CM to follow-up with her brother in regards to any further update due to her leaving the country soon. CM contacted the [facility] to inquire about bed availability. The [facility] indicated that all of there beds are occupied at this time. CM attempted to contact Pt's son, however son was unavailable. CM will continue to follow-up with discharge planning.        Tawnya Smith, [facility], [address] [place] [address]  [phone]

## 2018-08-07 NOTE — PROGRESS NOTES
08/07/18 0828   Vital Signs   Temp 98.3 °F (36.8 °C)   Temp Source Oral   Pulse (Heart Rate) (!) 117   Heart Rate Source Monitor   Resp Rate 24   O2 Sat (%) 98 %   Level of Consciousness Alert   /76   MAP (Calculated) 95   BP 1 Method Automatic   BP 1 Location Left arm   BP Patient Position Sitting   MEWS Score 4   Oxygen Therapy   O2 Device Nasal cannula   O2 Flow Rate (L/min) 2 l/min   Patient Observation   Activity In chair     MEWS 4 due to elevated HR and BP. MD aware. Morning medications given. Will continue to monitor.

## 2018-08-08 NOTE — PROGRESS NOTES
Hospitalist Progress Note    NAME: Landy Peabody   :  1931   MRN:  [id number]       Assessment / Plan:  80 y.o.   male with pmh of HTN, CAD, HDL, recent PPM, COPD, CKD and dementia who presented to ED with SOB. CXR revealed increased interstitial and parenchymal pulmonary edema. Admitted with diagnosis of CHF vs PNA. She completed a 5-day course of levofloxacin and received IV lasix with clinical improvement.       SOB  Pulmonary edema, secondary to acute on chronic systolic heart failure and rvr afib/flutter  CAP, bacterial   Acute hypoxemic respiratory failure  CXR revealed increased interstitial and parenchymal pulmonary edema. VQ scan very low probability   Echo: LVEF 40 %. Mild diffuse hypokinesis. Clinical improvement. Cont' lasix continue fluid restriction, daily weights  Completed 5 days of Levofloxacin   CXR- improved aeration, but persistent interstitial changes B, reviewed images, doubt CHF   Still on O2, wean as tolerated     Recurrent Afib/flutter  S/P PPM  Misplaced Atrial lead  Orthostatic hypotension  -atrial lead revision done by Dr. Leeann Gutiérrez on .   -patient candidate for anticoagulation but will hold for now due to GI bleed and suspect fall risk.   -cont' amiodarone and metoprolol, titrate by cardiology  -BP improved with midodrine, ALLAN hose    Diarrhea 2nd to c diff  -Cdiff positive  -cont' PO vancomycin. Had 1 bm overnight    Hypothyroid, new  TSH 23, low FT4, new dx,  - started synthroid 50 and recheck tsh in 4 weeks.     Melena/hematochezia  Patient with episode of melena/hematochezia , no further episodes since then. GI consulted, no procedure indicated at this time.   -continue PPI  -hold OAc for now     CAD  -stable.  Continue Aspirin, Plavix, and atorvastatin.      COPD, suspect mild acute on chronic exac  Acute hypoxemic resp failure  - continue with Arnuity ellipta and duoneb.   -added flutter valve and xopenex low dose scheduled to see if resp improves, watch HR  -cont nebs today, avoid IV/po steroids for now, cont inhaled     HTN  BP is adequate.      CKD4  Anemia, likely from renal failure  -At baseline? Don't see prior labs, but stage 4 documented in prior notes (H&P 11/16)  -cr 2.3 today. Close monitor.         Hx TIA   -con't plavix      Hx throat cancer      Dementia   -continue aricept      Depression / anxiety  -continue zoloft      Debility  PT/OT following. SNF at discharge, awaiting for family to choose facility     18.5 - 24.9 Normal weight / Body mass index is 23.56 kg/(m^2).     Code status: Full  Prophylaxis: Lovenox on hold with gi bleed  Recommended Disposition: recs for snf. Ready for dc, but CM reports that snf will not accept him with +c.diff. Subjective:     Chief Complaint / Reason for Physician Visit  sob  Pt seen at bedside with his son. He has no complaints. Had 1 loose BM overnight but none since this morning. Reviewed dx and rx with pt's son. All questions answered. Discussed with RN events overnight. Review of Systems:  Symptom Y/N Comments  Symptom Y/N Comments   Fever/Chills    Chest Pain n    Poor Appetite    Edema     Cough n   Abdominal Pain n    Sputum    Joint Pain     SOB/REYEZ n   Pruritis/Rash     Nausea/vomit    Tolerating PT/OT     Diarrhea y improved  Tolerating Diet     Constipation    Other       Could NOT obtain due to:      Objective:     VITALS:   Last 24hrs VS reviewed since prior progress note.  Most recent are:  Patient Vitals for the past 24 hrs:   Temp Pulse Resp BP SpO2   08/08/18 1124 97.6 °F (36.4 °C) 86 20 114/64 100 %   08/08/18 0836 98.4 °F (36.9 °C) (!) 112 20 149/88 99 %   08/08/18 0332 97.8 °F (36.6 °C) 92 22 135/82 97 %   08/08/18 0239 98 °F (36.7 °C) 94 18 142/89 96 %   08/07/18 2247 97.8 °F (36.6 °C) 89 18 119/76 97 %   08/07/18 1949 98.2 °F (36.8 °C) 86 18 92/55 99 %   08/07/18 1933 - - - - 98 %   08/07/18 1723 - (!) 106 - 125/77 -   08/07/18 1456 98.5 °F (36.9 °C) (!) 103 20 110/64 92 %   08/07/18 1433 - 93 - 116/68 -   08/07/18 1431 - 90 - 94/45 95 %   08/07/18 1428 - (!) 112 - (!) 86/52 -   08/07/18 1425 - (!) 106 - 101/76 -   08/07/18 1421 - 92 - 122/71 91 %   08/07/18 1418 - 98 - 129/82 91 %       Intake/Output Summary (Last 24 hours) at 08/08/18 1330  Last data filed at 08/07/18 1949   Gross per 24 hour   Intake              330 ml   Output              275 ml   Net               55 ml        PHYSICAL EXAM:  PHYSICAL EXAM:  General: WD, WN. Alert, cooperative, no acute distress    EENT:  EOMI. Anicteric sclerae. MMM  Resp:  CTA bilaterally, no wheezing or rales. No accessory muscle use  CV:  Regular  rhythm,  No edema  GI:  Soft, Non distended, Non tender.  +Bowel sounds  Neurologic:  Alert and oriented X 3, normal speech  Psych:   Not anxious nor agitated  Skin:  No rashes. No jaundice    Reviewed most cyurrent lab test results and cultures  YES  Reviewed most current radiology test results   YES  Review and summation of old records today    NO  Reviewed patient's current orders and MAR    YES  PMH/SH reviewed - no change compared to H&P  ________________________________________________________________________  Care Plan discussed with:    Comments   Patient y    Family  y Pt's son   RNy y    Care Manager y    Consultant                        Multidiciplinary team rounds were held today with , nursing, pharmacist and clinical coordinator. Patient's plan of care was discussed; medications were reviewed and discharge planning was addressed.      ________________________________________________________________________  Total NON critical care TIME:  35 Minutes    Total CRITICAL CARE TIME Spent:   Minutes non procedure based      Comments   >50% of visit spent in counseling and coordination of care     ________________________________________________________________________  Damien Jennings MD     Procedures: see electronic medical records for all procedures/Xrays and details which were not copied into this note but were reviewed prior to creation of Plan. LABS:  I reviewed today's most current labs and imaging studies.   Pertinent labs include:  Recent Labs      08/08/18 0335 08/07/18 0516  08/06/18   0112   WBC  13.8*  11.2*  13.9*   HGB  9.7*  8.9*  9.5*   HCT  33.7*  30.3*  33.0*   PLT  343  297  304     Recent Labs      08/08/18 0335 08/07/18 0516  08/06/18   0112   NA  141  141  141   K  3.5  3.5  4.0   CL  107  106  107   CO2  26  28  27   GLU  83  78  83   BUN  28*  30*  30*   CREA  2.49*  2.45*  2.57*   CA  9.1  8.9  8.7       Signed: Miguel Spencer MD

## 2018-08-08 NOTE — PROGRESS NOTES
CM contacted Pt's son in regards to discuss possible alternative for placement, due to [facility] not being able to accept pt on contact precaution. CM recommended pt possibly discharging to a SNF in the [place] area. Pt son was not receptive to pt discharging until C-diff is resolved. Pt son stated that he would be coming to the hospital on today to further discuss. CM spoke with the Pt about discharge plan. Pt became agitated. Pt stated that he would like to go to [facility] to be closer with family and that he would not want to be in the hospital for the course of C-diff treatment. CM will continue to follow-up with discharge planning.        Gilles Irving, [address] [place] [address]  [phone]

## 2018-08-08 NOTE — PROGRESS NOTES
Problem: Mobility Impaired (Adult and Pediatric)  Goal: *Acute Goals and Plan of Care (Insert Text)  Physical Therapy Goals  Goals reviewed and remained appropriate 8/8/18  Initiated 8/1/2018  1. Patient will move from supine to sit and sit to supine , scoot up and down and roll side to side in bed with independence within 7 day(s). 2.  Patient will transfer from bed to chair and chair to bed with modified independence using the least restrictive device within 7 day(s). 3.  Patient will perform sit to stand with modified independence within 7 day(s). 4.  Patient will ambulate with supervision/set-up for 200 feet with the least restrictive device within 7 day(s). 5.  Patient will ascend/descend 5 stairs with 1 handrail(s) with supervision/set-up within 7 day(s). physical Therapy TREATMENT: WEEKLY REASSESSMENT  Patient: Tramaine Grimm (60 y.o. male)  Date: 8/8/2018  Diagnosis: SOB (shortness of breath) on exertion <principal problem not specified>       Precautions: Fall (Pacemaker)  Chart, physical therapy assessment, plan of care and goals were reviewed. ASSESSMENT:  Pt received supine in bed with RN present and agreeable to PT intervention. Pt cleared by nursing for mobility. Pt continues to demonstrate limited functional mobility secondary to impaired balance, generalized weakness, impaired gait mechanics, and decreased activity tolerance. VS assessed during positional changes as noted below. Bed mobility performed with SBA/supervision. Sitting balance intact. Sit<>stand transfers performed with CGA, however continues to need cueing for safe hand placement with use of RW during transfers. Pt stood with RW support and CGA x ~ 4 minutes. BP taken with initial standing and taken after standing hip flexion 10x BLE. BP with significant decline, however pt asymptomatic. ALLAN hose in place throughout therapy session. Pt immediately returned to bed with BP improved in supine.  Pt was left supine in bed with all needs met and RN aware following therapy session. Continue to recommend pt discharge to SNF rehab to improve functional mobility and independence. PT will continue to follow to address mobility impairments as noted above. Vitals:    08/08/18 1501 08/08/18 1503 08/08/18 1505 08/08/18 1507   BP: 100/72 93/66 (!) 75/51 98/56   BP 1 Location: Left arm Left arm Left arm Left arm   BP Patient Position: Sitting Standing Standing  Comment: after marching in place Supine  Comment: HOB flat   Pulse: (!) 114 (!) 112  88   Resp:       Temp:       SpO2:       Weight:       Height:         Patient's progression toward goals since last assessment: Slow progress; limited by BP     PLAN:  Goals have been updated based on progression since last assessment. Patient continues to benefit from skilled intervention to address the above impairments. Continue to follow the patient 3 times a week to address goals. Planned Interventions:  [x]              Bed Mobility Training             []       Neuromuscular Re-Education  [x]              Transfer Training                   []       Orthotic/Prosthetic Training  [x]              Gait Training                         []       Modalities  [x]              Therapeutic Exercises           []       Edema Management/Control  [x]              Therapeutic Activities            [x]       Patient and Family Training/Education  []              Other (comment):  Discharge Recommendations: Skilled Nursing Facility  Further Equipment Recommendations for Discharge: TBD by rehab     SUBJECTIVE:   Patient stated I feel great.     OBJECTIVE DATA SUMMARY:   Critical Behavior:  Neurologic State: Alert  Orientation Level: Oriented X4  Cognition: Appropriate decision making, Follows commands  Safety/Judgement: Awareness of environment, Fall prevention, Home safety    Strength:   Strength: Generally decreased, functional                      Functional Mobility Training:  Bed Mobility:  Rolling: Supervision  Supine to Sit: Stand-by assistance  Sit to Supine: Stand-by assistance  Scooting: Supervision        Transfers:  Sit to Stand: Contact guard assistance  Stand to Sit: Contact guard assistance                             Balance:  Sitting: Intact  Standing: Impaired; With support  Standing - Static: Good  Standing - Dynamic : Fair    Therapeutic Exercises:   Standing marching x 10 BLE with RW support and min/CGA for safety  Pain:  Pain Scale 1: Numeric (0 - 10)  Pain Intensity 1: 0              Activity Tolerance:   Fair - marked decline in BP with standing activities, however pt asymptomatic; SaO2 >90% on 2 L/min O2; no pain complaints  Please refer to the flowsheet for vital signs taken during this treatment.   After treatment:   []  Patient left in no apparent distress sitting up in chair  [x]  Patient left in no apparent distress in bed  [x]  Call bell left within reach  [x]  Nursing notified  []  Caregiver present  []  Bed alarm activated    COMMUNICATION/COLLABORATION:   The patients plan of care was discussed with: Physical Therapist, Occupational Therapist and Registered Nurse    Nohemy Valadez PT, DPT   Time Calculation: 22 mins

## 2018-08-08 NOTE — PROGRESS NOTES
0700: Bedside report received from [facility], [address] [place] [address].     2488: Pt had a large, liquid bowel movement. 1443: Pt had a medium, loose bowel movement. 1513: PT made nurse aware of BP drop while pt while standing to 75/51 with tiara hose on.     1540: Dr Madeline Hanson notified. Orders received to increase midodrine to 5mg TID.     1900:   Bedside shift change report given to Jared Dudley RN (oncoming nurse). Report included the following information SBAR, Kardex, Intake/Output, MAR, Recent Results and Cardiac Rhythm Afib. SHIFT SUMMARY:            [address] NURSING NOTE   Admission Date 7/27/2018   Admission Diagnosis SOB (shortness of breath) on exertion   Consults IP CONSULT TO GASTROENTEROLOGY      Cardiac Monitoring [x] Yes [] No      Purposeful Hourly Rounding [x] Yes    Bony Score Total Score: 3   Bony score 3 or > [x] Bed Alarm [] Avasys [] 1:1 sitter [] Patient refused (Signed refusal form in chart)   Bharathi Score Bharathi Score: 17   Bharathi score 14 or < [] PMT consult [] Wound Care consult    []  Specialty bed  [] Nutrition consult      Influenza Vaccine Received Flu Vaccine for Current Season (usually Sept-March): Not Flu Season           Oxygen needs? [] Room air Oxygen @  []1L    [x]2L    []3L   []4L    []5L   []6L via  NC   Chronic home O2 use?  [] Yes [x] No  Perform O2 challenge test and document in progress note using smartphrase (.Homeoxygen)      Last bowel movement Last Bowel Movement Date: 08/07/18      Urinary Catheter [REMOVED] Urinary Catheter 07/30/18-Indications for Use: Surgery  [REMOVED] Condom Catheter 07/30/18-Indications for Use:  (accidnetal removal)     [REMOVED] Condom Catheter 07/30/18-Urine Output (mL): 275 ml     LDAs               Peripheral IV 08/06/18 Right Antecubital (Active)   Site Assessment Clean, dry, & intact 8/8/2018  3:59 PM   Phlebitis Assessment 0 8/8/2018  3:59 PM   Infiltration Assessment 0 8/8/2018  3:59 PM   Dressing Status Clean, dry, & intact 8/8/2018  3:59 PM   Dressing Type Transparent;Tape 8/8/2018  3:59 PM   Hub Color/Line Status Blue;Flushed 8/8/2018  3:59 PM                         Readmission Risk Assessment Tool Score High Risk            22       Total Score        3 Has Seen PCP in Last 6 Months (Yes=3, No=0)    2 . Living with Significant Other. Assisted Living. LTAC. SNF. or   Rehab    3 Patient Length of Stay (>5 days = 3)    4 IP Visits Last 12 Months (1-3=4, 4=9, >4=11)    5 Pt.  Coverage (Medicare=5 , Medicaid, or Self-Pay=4)    5 Charlson Comorbidity Score (Age + Comorbid Conditions)       Expected Length of Stay 3d 7h   Actual Length of Stay 12

## 2018-08-08 NOTE — PROGRESS NOTES
08/08/18 0836   Vital Signs   Temp 98.4 °F (36.9 °C)   Temp Source Oral   Pulse (Heart Rate) (!) 112   Heart Rate Source Monitor   Resp Rate 20   O2 Sat (%) 99 %   Level of Consciousness Alert   /88   MAP (Calculated) 108   BP 1 Method Automatic   BP 1 Location Left arm   BP Patient Position At rest   MEWS Score 3   Oxygen Therapy   O2 Device Nasal cannula   O2 Flow Rate (L/min) 2 l/min   Patient Observation   Activity In bed;Resting quietly     MEWS 3 due to increased HR. Morning medications given. MD aware. Will continue to monitor.

## 2018-08-08 NOTE — PROGRESS NOTES
1900 Received report from Jax, [address] [facility]. Assumed patient care. 1935 Pt requesting to have bedpan placed under him just in case he needs to go to the bathroom. Informed pt that he cannot sit on bedpan for 20-30 minutes as requested because it puts him at a higher risk for skin breakdown. Pt is on bedpan and will be checked frequently. 1942 Pt removed from bedpan. Pt urinated. No stool noted. Skin on buttocks and hip slightly pink and blanches with no sign of breakdown. 2317 Pt requesting to be placed on bedpan. Pt does not currently have to have a bowel movement but wants to sit on it just in case he has to go. Educated pt that he cannot keep bedpan under him while he sleeps due to skin breakdown. I informed pt that if he had an accident it would be fine and could be cleaned up.    0552 Pt continues to request to sit on bedpan to prevent having a bowel movement in bed and \"make a mess\". Pt continues to be educated that he cannot sit on the bedpan for an unlimited amount of time. Will place pt on it and monitor time. 0600 Pt removed from bedpan. Skin around buttocks and hip slightly pink but blanches with no sign of breakdown. Bedside shift change report given to Dori Mccarty RN (oncoming nurse) by Luann Riley RN (offgoing nurse). Report included the following information SBAR, Kardex, Intake/Output, MAR, Recent Results and Cardiac Rhythm AFib.

## 2018-08-08 NOTE — PROGRESS NOTES
2330-Bedside shift change report given to EDNA Casillas  by [facility] . Report included the following information SBAR and Kardex.

## 2018-08-08 NOTE — PROGRESS NOTES
Cardiology Progress Note      8/8/2018 6:14 PM    Admit Date: 7/27/2018          Subjective: Still orthostatic despite midodrine. Visit Vitals    /72 (BP 1 Location: Left arm, BP Patient Position: At rest)    Pulse 85    Temp 98.4 °F (36.9 °C)    Resp 20    Ht 5' 6\" (1.676 m)    Wt 63.5 kg (140 lb)    SpO2 94%    BMI 22.6 kg/m2     08/06 1901 - 08/08 0700  In: 18 [P.O.:570]  Out: 825 [Urine:825]        Objective:      Physical Exam:  VS as above    Data Review:   Labs:    Creat 2.5  Hgb 9.7     Telemetry: afib R 80       Assessment:     1.  Shortness of breath with documented hypoxia, unclear cause. Pneumonia vs CHF - better   2.  Status post recent dual chamber pacemaker placement. S/p atrial lead revision   3.  Coronary artery disease with remote non-ST elevation myocardial infarction. 4.  History of paroxysmal atrial fibrillation and sick sinus syndrome with recent pacemaker placement. Recurrent afib/ flutter  5.  Pulmonary hypertension with right heart dysfunction of uncertain cause. 6.  Mild dementia. 7.  Chronic renal insufficiency. 8.  Chronic anemia. 9.  Prior CVA. 10.  Chronic Guillain-Estacada syndrome. 11. Recent melena and hematochezia   12. c diff colitis   13. Orthostatic hypotension     Plan: Will increase midodrine.  Cont other Rx

## 2018-08-08 NOTE — PROGRESS NOTES
Bedside shift change report given to Rodolfo Otto (oncoming nurse) by Germán Patel (offgoing nurse). Report included the following information SBAR, Kardex, Intake/Output, MAR and Recent Results.

## 2018-08-08 NOTE — PROGRESS NOTES
Problem: Falls - Risk of  Goal: *Absence of Falls  Document Bony Fall Risk and appropriate interventions in the flowsheet.    Outcome: Progressing Towards Goal  Fall Risk Interventions:  Mobility Interventions: Bed/chair exit alarm, Patient to call before getting OOB, Strengthening exercises (ROM-active/passive), Utilize walker, cane, or other assistive device    Mentation Interventions: Bed/chair exit alarm, Adequate sleep, hydration, pain control    Medication Interventions: Bed/chair exit alarm, Patient to call before getting OOB, Teach patient to arise slowly    Elimination Interventions: Bed/chair exit alarm, Call light in reach, Patient to call for help with toileting needs

## 2018-08-08 NOTE — PROGRESS NOTES
Nutrition Assessment:    INTERVENTIONS/RECOMMENDATIONS:   Continue current diet    ASSESSMENT:   Chart reviewed. Pt reports a good appetite and eating well. Pt ready for discharge however facility cannot take pt d/t C-dif. Diet Order: Cardiac  % Eaten:  Patient Vitals for the past 72 hrs:   % Diet Eaten   08/07/18 1017 25 %   08/06/18 1047 25 %   08/05/18 1125 50 %     Pertinent Medications: [x]Reviewed: Fe, lasix, lactobac, PPI  Pertinent Labs: [x]Reviewed:   Food Allergies: [x]NKFA  []Other   Last BM:  8/7  Edema:      []RUE   []LUE   []RLE   []LLE      Pressure Ulcer:      [] Stage I   [] Stage II   [] Stage III   [] Stage IV      Anthropometrics: Height: 5' 6\" (167.6 cm) Weight: 63.5 kg (140 lb)    IBW (%IBW):   ( ) UBW (%UBW):   (  %)    BMI: Body mass index is 22.6 kg/(m^2). This BMI is indicative of:  []Underweight   [x]Normal   []Overweight   [] Obesity   [] Extreme Obesity (BMI>40)  Last Weight Metrics:  Weight Loss Metrics 8/8/2018 7/12/2018 5/11/2018 5/11/2017 11/10/2016 11/24/2015 8/11/2015   Today's Wt 140 lb 146 lb 160 lb 153 lb 163 lb 174 lb 171 lb   BMI 22.6 kg/m2 23.57 kg/m2 25.06 kg/m2 23.61 kg/m2 22.73 kg/m2 24.28 kg/m2 23.86 kg/m2       Estimated Nutrition Needs (Based on): 1625 Kcals/day (BMR: 1250 x 1.3) , 65 g (1 g/kg) Protein  Carbohydrate: At Least 130 g/day  Fluids: 1625 mL/day or per primary team    NUTRITION DIAGNOSES:   Problem:  No nutritional diagnosis at this time      Etiology: related to       Signs/Symptoms: as evidenced by      Previous Nutrition Dx:  [] Resolved  [] Unresolved           [] Progressing    NUTRITION INTERVENTIONS:  Meals/Snacks: General/healthful diet                  GOAL:   consume >50% of meals in 5-7 days    NUTRITION MONITORING AND EVALUATION      Food/Nutrient Intake Outcomes:  Total energy intake  Physical Signs/Symptoms Outcomes: Weight/weight change, Electrolyte and renal profile, GI    Previous Goal Met:   [x] Met              [] Progressing Towards Goal              [] Not Progressing Towards Goal   Previous Recommendations:   [] Implemented          [] Not Implemented          [x] Not Applicable    LEARNING NEEDS (Diet, Food/Nutrient-Drug Interaction):    [x] None Identified   [] Identified and Education Provided/Documented   [] Identified and Pt declined/was not appropriate     Cultural, Confucianism, OR Ethnic Dietary Needs:    [x] None Identified   [] Identified and Addressed     [x] Interdisciplinary Care Plan Reviewed/Documented    [x] Discharge Planning: Heart healthy diet   [] Participated in Interdisciplinary Rounds    NUTRITION RISK:    [] High              [] Moderate           [x]  Low  []  Minimal/Uncompromised      Horacio Miller RDN  Pager [phone]  Weekend Pager [phone]

## 2018-08-08 NOTE — PROGRESS NOTES
CM was notified by attending that Pt son was now receptive to viewing alternative SNF placement. CM met with Pt and Son. Pt and son were both receptive to finding an alternative SNF. Pt son stated isai he would like SNF to be within close proximity of family. Pt son requested that referral is sent to [facility], [facility]. CM sent referral via [email or web address]. CM awaiting response.            Ted Solo, [facility], [address] [place] [address]  [phone]

## 2018-08-09 NOTE — PROGRESS NOTES
Hospitalist Progress Note    NAME: Ana Falcon   :  1931   MRN:  [id number]       Assessment / Plan:  80 y.o.   male with pmh of HTN, CAD, HDL, recent PPM, COPD, CKD and dementia who presented to ED with SOB. CXR revealed increased interstitial and parenchymal pulmonary edema. Admitted with diagnosis of CHF vs PNA. She completed a 5-day course of levofloxacin and received IV lasix with clinical improvement.       SOB  Pulmonary edema, secondary to acute on chronic systolic heart failure and rvr afib/flutter  CAP, bacterial   Acute hypoxemic respiratory failure  CXR revealed increased interstitial and parenchymal pulmonary edema. VQ scan very low probability   Echo: LVEF 40 %. Mild diffuse hypokinesis. Clinical improvement. Cont' lasix continue fluid restriction, daily weights  Completed 5 days of Levofloxacin   CXR- improved aeration, but persistent interstitial changes B, reviewed images, doubt CHF   Still on O2, wean as tolerated  CM consulted to help with dispo, family opted for [facility]     Recurrent Afib/flutter  S/P PPM  Misplaced Atrial lead  Orthostatic hypotension  -atrial lead revision done by Dr. Katherin Breen on .   -patient candidate for anticoagulation but will hold for now due to GI bleed and suspect fall risk.   -cont' amiodarone and metoprolol, titrate by cardiology  -cont' midodrine, currently at max dose    Diarrhea secondary to c diff  -Cdiff positive, monitor frequency and consistency of bm  -cont' PO vancomycin to complete 14 days    Hypothyroid, new  TSH 23, low FT4, new dx,  - started synthroid 50 and recheck tsh in 4 weeks.     Melena/hematochezia  Patient with episode of melena/hematochezia , no further episodes since then. GI consulted, no procedure indicated at this time.   -continue PPI  -hold OAc for now     CAD  -stable.  Continue Aspirin, Plavix, and atorvastatin.      COPD, suspect mild acute on chronic exac  Acute hypoxemic resp failure  - continue with Arnuity ellipta and duoneb.   -added flutter valve and xopenex low dose scheduled to see if resp improves, watch HR  -cont nebs today, avoid IV/po steroids for now, cont inhaled     HTN  BP is adequate.      CKD4  Anemia, likely from renal failure  -At baseline? Don't see prior labs, but stage 4 documented in prior notes (H&P 11/16)    -monitor.         Hx TIA   -con't plavix      Hx throat cancer      Dementia   -continue aricept      Depression / anxiety  -continue zoloft      Debility  PT/OT following. SNF at discharge, awaiting for family to choose facility     18.5 - 24.9 Normal weight / Body mass index is 23.56 kg/(m^2).     Code status: Full  Prophylaxis: Lovenox on hold with gi bleed, apply SCDs  Recommended Disposition: recs for snf. Ready for dc, but CM reports that snf will not accept him with +c.diff. Subjective:     Chief Complaint / Reason for Physician Visit  sob  No new complaints. Had 2 bm overnight. Afebrile. Discussed with RN events overnight. Review of Systems:  Symptom Y/N Comments  Symptom Y/N Comments   Fever/Chills    Chest Pain n    Poor Appetite    Edema     Cough n   Abdominal Pain n    Sputum    Joint Pain     SOB/REYEZ n   Pruritis/Rash     Nausea/vomit    Tolerating PT/OT     Diarrhea y improved  Tolerating Diet     Constipation    Other       Could NOT obtain due to:      Objective:     VITALS:   Last 24hrs VS reviewed since prior progress note.  Most recent are:  Patient Vitals for the past 24 hrs:   Temp Pulse Resp BP SpO2   08/09/18 0810 - - - - 94 %   08/09/18 0730 97.8 °F (36.6 °C) (!) 110 20 136/89 93 %   08/09/18 0248 97.9 °F (36.6 °C) 100 20 139/76 91 %   08/08/18 2309 97.8 °F (36.6 °C) 81 20 118/63 97 %   08/08/18 1935 98 °F (36.7 °C) 83 20 99/69 92 %   08/08/18 1705 98.4 °F (36.9 °C) 85 20 123/72 94 %   08/08/18 1507 - 88 - 98/56 -   08/08/18 1505 - - - (!) 75/51 -   08/08/18 1503 - (!) 112 - 93/66 -   08/08/18 1501 - (!) 114 - 100/72 -   08/08/18 1500 - 93 - 107/57 95 %   08/08/18 1426 - - - - 98 %   08/08/18 1124 97.6 °F (36.4 °C) 86 20 114/64 100 %       Intake/Output Summary (Last 24 hours) at 08/09/18 0926  Last data filed at 08/09/18 0438   Gross per 24 hour   Intake              450 ml   Output              350 ml   Net              100 ml        PHYSICAL EXAM:  PHYSICAL EXAM:  General: WD, WN. Alert, cooperative, no acute distress    EENT:  EOMI. Anicteric sclerae. MMM  Resp:  CTA bilaterally, no wheezing or rales. No accessory muscle use  CV:  Regular  rhythm,  No edema  GI:  Soft, Non distended, Non tender.  +Bowel sounds  Neurologic:  Alert and oriented X 3, normal speech  Psych:   Not anxious nor agitated  Skin:  No rashes. No jaundice    Reviewed most cyurrent lab test results and cultures  YES  Reviewed most current radiology test results   YES  Review and summation of old records today    NO  Reviewed patient's current orders and MAR    YES  PMH/ reviewed - no change compared to H&P  ________________________________________________________________________  Care Plan discussed with:    Comments   Patient y    Family  y Pt's son   RNy y    Care Manager y    Consultant                        Multidiciplinary team rounds were held today with , nursing, pharmacist and clinical coordinator. Patient's plan of care was discussed; medications were reviewed and discharge planning was addressed. ________________________________________________________________________  Total NON critical care TIME:  35 Minutes    Total CRITICAL CARE TIME Spent:   Minutes non procedure based      Comments   >50% of visit spent in counseling and coordination of care     ________________________________________________________________________  Gracia Dale MD     Procedures: see electronic medical records for all procedures/Xrays and details which were not copied into this note but were reviewed prior to creation of Plan.       LABS:  I reviewed today's most current labs and imaging studies.   Pertinent labs include:  Recent Labs      08/09/18   0420  08/08/18   0335  08/07/18   0516   WBC  12.8*  13.8*  11.2*   HGB  9.8*  9.7*  8.9*   HCT  34.0*  33.7*  30.3*   PLT  372  343  297     Recent Labs      08/09/18   0420  08/08/18   0335  08/07/18   0516   NA  142  141  141   K  3.6  3.5  3.5   CL  107  107  106   CO2  27  26  28   GLU  82  83  78   BUN  25*  28*  30*   CREA  2.43*  2.49*  2.45*   CA  9.3  9.1  8.9       Signed: Jacob Cerda MD

## 2018-08-09 NOTE — PROGRESS NOTES
Participated in [facility] rounds. Pt has pending SNF referral to [facility]. Requested update from facility on acceptance. Medical team discussed orthostatic remains issue for pt and this is continued to be monitored. [address] [facility] phone [phone].     NANCY Carter

## 2018-08-09 NOTE — PROGRESS NOTES
0730  Report received from [facility]. SBAR, Kardex, ED Summary, Procedure Summary, Intake/Output, MAR, Accordion, Recent Results, Med Rec Status and Cardiac Rhythm a-fib were discussed. [address]     Patient stated he had 1 bowl movement last night and 1 early this am.      Orthostatics today positive. Patient asymptomatic. Supine 114/64 HR 95  Sitting 100/62  HR 98  Standing 86/64   Once patient laid back down BP returned to 105/57    Oxygen dropped with exertion.   95% at rest on 2L NC  88 at rest on room air  86 on room air standing  92 on 3 L NC standing

## 2018-08-09 NOTE — PROGRESS NOTES
Cardiology Progress Note      8/9/2018 8:35 AM    Admit Date: 7/27/2018          Subjective: Stable this am. No new c/o. Orthostatics not yet checked          Visit Vitals    /89 (BP 1 Location: Left arm, BP Patient Position: At rest;Supine; Head of bed elevated (Comment degrees))  Comment (BP Patient Position): 30 degrees    Pulse (!) 110    Temp 97.8 °F (36.6 °C)    Resp 20    Ht 5' 6\" (1.676 m)    Wt 63.2 kg (139 lb 6.4 oz)    SpO2 94%    BMI 22.5 kg/m2     08/07 1901 - 08/09 0700  In: 600 [P.O.:600]  Out: 500 [Urine:500]        Objective:      Physical Exam:  VS as above    Data Review:   Labs:    BUN 25  Creat 2.4   Hgb 9.8     Telemetry: afib R 80       Assessment:     1.  Shortness of breath with documented hypoxia, unclear cause. Pneumonia vs CHF - better   2.  Status post recent dual chamber pacemaker placement. S/p atrial lead revision   3.  Coronary artery disease with remote non-ST elevation myocardial infarction. 4.  History of paroxysmal atrial fibrillation and sick sinus syndrome with recent pacemaker placement. Now persistant afib   5.  Pulmonary hypertension with right heart dysfunction of uncertain cause. 6.  Mild dementia. 7.  Chronic renal insufficiency. 8.  Chronic anemia. 9.  Prior CVA. 10.  Chronic Guillain-Lakeland syndrome. 11. Recent melena and hematochezia   12. c diff colitis   13. Orthostatic hypotension        Plan: Check orthostatics today. Hopefully can get to SNF soon.

## 2018-08-09 NOTE — PROGRESS NOTES
Problem: Falls - Risk of  Goal: *Absence of Falls  Document Bony Fall Risk and appropriate interventions in the flowsheet.    Outcome: Progressing Towards Goal  Fall Risk Interventions:  Mobility Interventions: Bed/chair exit alarm, Communicate number of staff needed for ambulation/transfer, Mechanical lift, OT consult for ADLs, Patient to call before getting OOB, PT Consult for mobility concerns, PT Consult for assist device competence, Strengthening exercises (ROM-active/passive), Utilize walker, cane, or other assistive device, Utilize gait belt for transfers/ambulation, Assess mobility with egress test    Mentation Interventions: Bed/chair exit alarm, Adequate sleep, hydration, pain control    Medication Interventions: Assess postural VS orthostatic hypotension, Bed/chair exit alarm, Evaluate medications/consider consulting pharmacy, Patient to call before getting OOB, Teach patient to arise slowly, Utilize gait belt for transfers/ambulation    Elimination Interventions: Bed/chair exit alarm, Call light in reach, Elevated toilet seat, Patient to call for help with toileting needs, Toilet paper/wipes in reach, Toileting schedule/hourly rounds, Urinal in reach    History of Falls Interventions: Consult care management for discharge planning, Bed/chair exit alarm, Door open when patient unattended, Evaluate medications/consider consulting pharmacy, Investigate reason for fall, Room close to nurse's station, Utilize gait belt for transfer/ambulation

## 2018-08-09 NOTE — PROGRESS NOTES
1900 Received report from [facility]. Assumed patient care. 0000 Pt continues to ask to be put on bedpan. Pt informed that the longer he sits on the bedpan the higher the risk for skin breakdown. Pt states \"I don't care I know I have to go at some point. \" I informed pt that if he had an accident it was no problem to get him cleaned up. Pt left in bed with call bell in reach. 0200 Pt placed on bedpan. Pt rang out to be removed from bedpan and sacrum/buttocks is pink but blanches and showed no signs of skin breakdown. Pt only urinated. Will continue to monitor. Bedside shift change report given to Shaan Zhong RN (oncoming nurse) by Anne Kay RN (offgoing nurse). Report included the following information SBAR, Kardex, Intake/Output, MAR, Recent Results and Cardiac Rhythm AFib.

## 2018-08-09 NOTE — PROGRESS NOTES
Problem: Self Care Deficits Care Plan (Adult)  Goal: *Acute Goals and Plan of Care (Insert Text)  Occupational Therapy Goals  7 Day Re-eval 8/9/2018 below goals adjusted  Initiated 8/1/2018  1. Patient will perform grooming at the sink with supervision/set-up within 7 day(s). 2.  Patient will perform bathing at the sink with moderate assistance  within 7 day(s). 3.  Patient will perform lower body dressing with moderate assistance  within 7 day(s). Upgrade to min assist 8/9/2018  4. Patient will perform toilet transfers with minimal assistance/contact guard assist within 7 day(s). Upgrade to [address] 8/9/2018  5. Patient will perform all aspects of toileting with supervision/set-up within 7 day(s). 6.  Patient will participate in upper extremity therapeutic exercise/activities with supervision/set-up for 5 minutes within 7 day(s). 7.  Patient will utilize energy conservation techniques during functional activities with verbal cues within 7 day(s). Occupational Therapy ReEVALUATION  Patient: Cam Griffin (44 y.o. male)  Date: 8/9/2018  Diagnosis: SOB (shortness of breath) on exertion <principal problem not specified>       Precautions: Contact (c-diff)    ASSESSMENT :  Based on the objective data described below, the patient presents with continued asymptomatic orthostasis that did not improve with activity today. BP was in the 23'W systolic in standing this session despite tiara hose. Standing exercises actually decreased BP. Supervision for rolling and SBA for supine to sit. Good static seated balance and fair dynamic seated balance. Min assist for sit to stand and CGA to min assist for static balance with one hand off walker to take BP. Changed gown seated with CGA. Pt has been able to mobilize to previous sessions to bathroom with min assist and perform standing to groom with min assist for balance. Continue to recommend SNF for rehab.      Patient will benefit from skilled intervention to address the above impairments. Patients rehabilitation potential is considered to be Fair  Factors which may influence rehabilitation potential include:   []                None noted  []                Mental ability/status  [x]                Medical condition  []                Home/family situation and support systems  []                Safety awareness  []                Pain tolerance/management  []                Other:      PLAN :  Recommendations and Planned Interventions:  [x]                  Self Care Training                  [x]           Therapeutic Activities  [x]                  Functional Mobility Training    []           Cognitive Retraining  [x]                  Therapeutic Exercises           []           Endurance Activities  [x]                  Balance Training                   []           Neuromuscular Re-Education  []                  Visual/Perceptual Training     [x]      Home Safety Training  [x]                  Patient Education                 [x]           Family Training/Education  []                  Other (comment):    Frequency/Duration: Patient will be followed by occupational therapy 3 times a week to address goals. Discharge Recommendations: Skilled Nursing Facility  Further Equipment Recommendations for Discharge: defer to SNF     SUBJECTIVE:   Patient stated I feel fine.     OBJECTIVE DATA SUMMARY:   Hospital course since last seen and reason for reevaluation: limited progress due to orthostasis  Cognitive/Behavioral Status:  Neurologic State: Alert  Orientation Level: Oriented X4  Cognition: Follows commands;Decreased attention/concentration  Perception: Appears intact  Perseveration: No perseveration noted  Safety/Judgement: Fall prevention    Vision/Perceptual:    Tracking: Able to track stimulus in all quadrants w/o difficulty                                Range of Motion:    AROM: Generally decreased, functional                       Strength:    Strength: Generally decreased, functional              Coordination:  Coordination: Within functional limits  Fine Motor Skills-Upper: Left Intact; Right Intact    Gross Motor Skills-Upper: Left Intact; Right Intact  Tone & Sensation:    Tone: Normal                         Balance:  Sitting: Intact  Standing - Static: Good  Standing - Dynamic : Fair    Functional Mobility and Transfers for ADLs:  Bed Mobility:  Rolling: Supervision  Supine to Sit: Stand-by assistance  Sit to Supine: Stand-by assistance  Scooting: Supervision    Transfers:  Sit to Stand: Contact guard assistance  Functional Transfers  Toilet Transfer : Minimum assistance   Bed to Chair: Minimum assistance    ADL Assessment:  Feeding: Setup    Oral Facial Hygiene/Grooming: Setup    Bathing: Moderate assistance    Upper Body Dressing: Contact guard assistance    Lower Body Dressing: Moderate assistance    Toileting: Moderate assistance              ADL Intervention:       See assessment  Cognitive Retraining  Safety/Judgement: Fall prevention      Functional Measure:  Barthel Index:    Bathin  Bladder: 0  Bowels: 5  Groomin  Dressin  Feeding: 10  Mobility: 0  Stairs: 0  Toilet Use: 5  Transfer (Bed to Chair and Back): 10  Total: 40       Barthel and G-code impairment scale:  Percentage of impairment CH  0% CI  1-19% CJ  20-39% CK  40-59% CL  60-79% CM  80-99% CN  100%   Barthel Score 0-100 100 99-80 79-60 59-40 20-39 1-19   0   Barthel Score 0-20 20 17-19 13-16 9-12 5-8 1-4 0      The Barthel ADL Index: Guidelines  1. The index should be used as a record of what a patient does, not as a record of what a patient could do. 2. The main aim is to establish degree of independence from any help, physical or verbal, however minor and for whatever reason. 3. The need for supervision renders the patient not independent. 4. A patient's performance should be established using the best available evidence.  Asking the patient, friends/relatives and nurses are the usual sources, but direct observation and common sense are also important. However direct testing is not needed. 5. Usually the patient's performance over the preceding 24-48 hours is important, but occasionally longer periods will be relevant. 6. Middle categories imply that the patient supplies over 50 per cent of the effort. 7. Use of aids to be independent is allowed. Harlee Cowden., Barthel, D.W. (0591). Functional evaluation: the Barthel Index. [address] [facility] (14)2. VERONICA Whitmore, Jerome Campos, Aron Mckeon., [place], [facility] (1999). Measuring the change indisability after inpatient rehabilitation; comparison of the responsiveness of the Barthel Index and Functional [place] Measure. Journal of Neurology, Neurosurgery, and Psychiatry, 66(4), 654-782. MARVIN Elliott, LAWRENCE Quiros, & Shivam Park M.A. (2004.) Assessment of post-stroke quality of life in cost-effectiveness studies: The usefulness of the Barthel Index and the EuroQoL-5D. Quality of Life Research, 13, 427-43         Pain:  Pain Scale 1: Numeric (0 - 10)  Pain Intensity 1: 0              Activity Tolerance:     Please refer to the flowsheet for vital signs taken during this treatment. After treatment:   [] Patient left in no apparent distress sitting up in chair  [x] Patient left in no apparent distress in bed  [x] Call bell left within reach  [x] Nursing notified  [x] Caregiver present  [x] Bed alarm activated    COMMUNICATION/EDUCATION:   The patients plan of care was discussed with: Physical Therapist, Registered Nurse and patient. []    Home safety education was provided and the patient/caregiver indicated understanding. []    Patient/family have participated as able in goal setting and plan of care. [x]    Patient agree to work toward stated goals and plan of care. []    Patient understands intent and goals of therapy, but is neutral about his/her participation.   []    Patient is unable to participate in goal setting and plan of care. This patients plan of care is appropriate for delegation to ELDER.     Thank you for this referral.  Danielle Kaur OTR/L  Time Calculation: 17 mins

## 2018-08-10 NOTE — PROGRESS NOTES
Met patient during IDR, spoke with Dr. Samantha Bal.  Discharge planned for AM.    Spoke with Briana/Daysi Banda/[facility] Lassiter [phone] ext [phone] Admissions Dept, they have accepted Mr. Jose Mondragon for short term rehab. Discharging RN    AMBULANCE PICKUP:  [facility]  [phone]   ETA 11:00AM. PCS ON BEDSIDE CHART     TO COMPLETE DISCHARGE,  RN PLEASE:      1. Call Report:  [phone]    2. Add to Discharge Envelope    __ Discharge Orders    __ MAR    __ Kardex    __ Hard-copy prescriptions (if any narcotics/controlled substances)    Care Management Interventions  PCP Verified by CM:  Yes  Mode of Transport at Discharge: BLS ([facility] set up for 11:00AM transport for Saturday)  Transition of Care Consult (CM Consult): SNF (Discharging to SNF, [address] - [phone]  ext [phone])  Partner SNF: No  Reason Why Partner SNF Not Chosen: Location (Mr. Jose Mondragon lives in [facility])  Physical Therapy Consult: Yes  Occupational Therapy Consult: Yes  Current Support Network: Lives with Spouse, Own Home  Confirm Follow Up Transport: Family  Plan discussed with Pt/Family/Caregiver: Yes (Discussed with patient, he is agreeable with discharge plan,  He states he will be closer to spouse.)  Freedom of Choice Offered: Yes  Discharge Location  Discharge Placement: Skilled nursing facility    Ivan Willis, RN, BSN, [facility] Care Management

## 2018-08-10 NOTE — PROGRESS NOTES
1900 Received report from Ivett Villagomez [place]. Assumed patient care. 0500 pt refused to be weighed, stating \"I just want to sleep\". Will continue trying to get pts weight before change of shift.    0625 Pt still refusing to be weighed. Will pass on to day shift nurse. Bedside shift change report given to Ivett Villagomez RN (oncoming nurse) by German White RN (offgoing nurse). Report included the following information SBAR, Kardex, Intake/Output, MAR, Recent Results and Cardiac Rhythm AFib.

## 2018-08-10 NOTE — PROGRESS NOTES
Hospitalist Progress Note    NAME: Destin Martinez   :  1931   MRN:  [id number]       Assessment / Plan:  80 y.o.   male with pmh of HTN, CAD, HDL, recent PPM, COPD, CKD and dementia who presented to ED with SOB. CXR revealed increased interstitial and parenchymal pulmonary edema. Admitted with diagnosis of CHF vs PNA. She completed a 5-day course of levofloxacin and received IV lasix with clinical improvement.       Pulmonary edema, 2nd to acute on chronic syst chf and rvr afib/flutter  CAP, bacterial   Acute hypoxemic resp failure  CXR revealed increased interstitial and parenchymal pulmonary edema. VQ scan very low probability   echo: LVEF 40 %. Mild diffuse hypokinesis. Clinical improvement. Changed lasix to po. continue fluid restriction, daily weights. completed 5 days of Levofloxacin. CXR- improved aeration, but persistent interstitial changes B  Still on O2, try to wean, improving slowly, but still needs o2 with activity   Awaiting SNF placement     Recurrent Afib/flutter  S/P PPM  Misplaced Atrial lead  HR remains elevated  Cont' amiodarone. Metoprolol increased at 100mg BID. However pt is orthostatic during PT session. Will discuss with cardiology in regards to this. Cardiology is following. Atrial lead revision done by Dr. Debra Giraldo on . Patient candidate for anticoagulation but will hold for now due to GI bleed and suspect fall risk. Cont' ALLAN hose and midodrine     Diarrhea 2nd to c diff  Cdiff positive. 1 bm overnight but no diarrhea  Cont' PO vancomycin to complete 14 days    Hypothyroid, new  TSH 23, low FT4, new dx, started synthroid 50 and recheck tsh in 4 weeks.     Melena/hematochezia  Patient with episode of melena/hematochezia , no further episodes since then. GI consulted, no procedure indicated at this time. Continue PPI.      CAD  Stable.  Continue Aspirin, Plavix and atorvastatin.      COPD, suspect mild acute on chronic exac  Acute hypoxemic resp failure  -  Continue with Arnuity ellipta and duoneb.   -added flutter valve and xopenex low dose scheduled to see if resp improves, watch HR  -cont nebs today, avoid IV/po steroids for now, cont inhaled     HTN  BP is adequate.      CKD4  Anemia, likely from renal failure  At baseline? Don't see prior labs, but stage 4 documented in prior notes (H&P 11/16)  Creat 2.3 today. Close monitor.        Hx TIA  On plavix      Hx throat cancer      Dementia   Continue aricept      Depression / anxiety  Continue zoloft      Debility  PT/OT following. SNF at discharge.     18.5 - 24.9 Normal weight / Body mass index is 23.56 kg/(m^2).     Code status: Full  Prophylaxis: Lovenox on hold with gi bleed  Recommended Disposition: recs for snf. Ready for dc, but CM reports that snf will not accept him with +c.diff. Subjective:     Chief Complaint / Reason for Physician Visit  sob  Pt seen at bedside. He states he feels great. Had one BM overnight but no diarrhea. Tolerating diet    Discussed with RN events overnight. Review of Systems:  Symptom Y/N Comments  Symptom Y/N Comments   Fever/Chills    Chest Pain n    Poor Appetite    Edema     Cough n   Abdominal Pain n    Sputum    Joint Pain     SOB/REYEZ n   Pruritis/Rash     Nausea/vomit    Tolerating PT/OT     Diarrhea n   Tolerating Diet     Constipation    Other       Could NOT obtain due to:      Objective:     VITALS:   Last 24hrs VS reviewed since prior progress note.  Most recent are:  Patient Vitals for the past 24 hrs:   Temp Pulse Resp BP SpO2   08/10/18 0931 - (P) 96 - (P) 129/75 -   08/10/18 0832 - - - - 98 %   08/10/18 0749 98.4 °F (36.9 °C) 92 22 145/70 96 %   08/10/18 0400 98.4 °F (36.9 °C) 88 28 127/75 97 %   08/09/18 2243 98.2 °F (36.8 °C) 76 28 121/61 96 %   08/09/18 2054 98.2 °F (36.8 °C) 79 24 116/66 95 %   08/09/18 1631 - - - - 97 %   08/09/18 1529 98 °F (36.7 °C) 92 20 127/63 99 %   08/09/18 1452 - - - 105/57 92 %   08/09/18 1447 - (!) 111 - (!) 86/64 -   08/09/18 1444 - 95 - 100/62 (!) 86 %   08/09/18 1442 - 95 - 114/64 96 %   08/09/18 1058 98.1 °F (36.7 °C) 99 20 132/77 91 %       Intake/Output Summary (Last 24 hours) at 08/10/18 0933  Last data filed at 08/10/18 0341   Gross per 24 hour   Intake              340 ml   Output               50 ml   Net              290 ml        PHYSICAL EXAM:  PHYSICAL EXAM:  General: WD, WN. Alert, cooperative, no acute distress    EENT:  EOMI. Anicteric sclerae. MMM  Resp:  CTA bilaterally, no wheezing or rales. No accessory muscle use  CV:  Regular  rhythm,  No edema  GI:  Soft, Non distended, Non tender.  +BS  Neurologic:  Alert and oriented X 3, normal speech  Psych:   Not anxious nor agitated  Skin:  No rashes. No jaundice    Reviewed most cyurrent lab test results and cultures  YES  Reviewed most current radiology test results   YES  Review and summation of old records today    NO  Reviewed patient's current orders and MAR    YES  PMH/SH reviewed - no change compared to H&P  ________________________________________________________________________  Care Plan discussed with:    Comments   Patient y    Family      RNy y    Care Manager y    Consultant                        Multidiciplinary team rounds were held today with , nursing, pharmacist and clinical coordinator. Patient's plan of care was discussed; medications were reviewed and discharge planning was addressed. ________________________________________________________________________  Total NON critical care TIME:  35 Minutes    Total CRITICAL CARE TIME Spent:   Minutes non procedure based      Comments   >50% of visit spent in counseling and coordination of care     ________________________________________________________________________  Gracia Dale MD     Procedures: see electronic medical records for all procedures/Xrays and details which were not copied into this note but were reviewed prior to creation of Plan.       LABS:  I reviewed today's most current labs and imaging studies.   Pertinent labs include:  Recent Labs      08/09/18   0420  08/08/18   0335   WBC  12.8*  13.8*   HGB  9.8*  9.7*   HCT  34.0*  33.7*   PLT  372  343     Recent Labs      08/10/18   0441  08/09/18   0420  08/08/18   0335   NA  142  142  141   K  3.4*  3.6  3.5   CL  108  107  107   CO2  26  27  26   GLU  82  82  83   BUN  23*  25*  28*   CREA  2.49*  2.43*  2.49*   CA  8.8  9.3  9.1       Signed: Ginna Arizmendi MD

## 2018-08-10 NOTE — ROUTINE PROCESS
Bedside shift change report given to Flower Burnham (oncoming nurse) by Lottie Zimmerman (offgoing nurse). Report included the following information SBAR, Kardex, Intake/Output, MAR, Accordion, Recent Results, Med Rec Status and Cardiac Rhythm a fib.

## 2018-08-10 NOTE — DISCHARGE SUMMARY
Discharge Summary      Name: Tramaine Grimm  [id number]  YOB: 1931 (Age: 80 y.o.)   Date of Admission: 7/27/2018  Date of Discharge: 8/11/2018  Attending Physician: Consuelo John MD    Discharge Diagnosis:   Pulmonary edema, secondary to acute on chronic systolic heart failure and rvr afib/flutter  CAP  Acute hypoxemic respiratory failure  Recurrent Afib/flutter  S/P PPM  Misplaced Atrial lead  Orthostatic hypotension     Consultations:  IP CONSULT TO GASTROENTEROLOGY    Brief Admission History/Reason for Admission Per Ana Jackson MD:   Tramaine Grimm is a 80 y.o.  male with a history of HTN, HDL, recent PPM, COPD, CKD and dementia who was referred to the ER for SOB and to rule out PE. Patient had a PPM inserted 2 weeks ago. He presented to his cardiologist office today for follow up and reported his SOB. He denies orthopnea, PND, worsening edema but his dyspnea is more with activities. ER evaluation included CXR showing diffuse interstitial disease. VQ scan pending. He was started on antibiotics and we were asked to admit for work up and evaluation of the above problems. Brief Hospital Course by Main Problems:   Pulmonary edema, secondary to acute on chronic systolic heart failure and rvr afib/flutter  CAP  Acute hypoxemic respiratory failure  CXR revealed increased interstitial and parenchymal pulmonary edema. VQ scan very low probability   Echo: LVEF 40 %. Mild diffuse hypokinesis. Clinical improvement. Cont' lasix continue fluid restriction, daily weights  Completed 5 days of Levofloxacin      Recurrent Afib/flutter  S/P PPM  Misplaced Atrial lead  HTN  Pt had atrial lead revision done by Dr. Chloé Delgado on 7/30. Patient is a candidate for anticoagulation but will hold for now due to GI bleed and suspect fall risk. Cont' amiodarone and metoprolol per cardiology.     Orthostatic hypotension, continue midodrine.     Diarrhea secondary to c diff  Cdiff positive. Had 1 loose BM overnight. Cont' PO vancomycin to complete 14 days as prescribed.     Hypothyroid, new  TSH 23, low FT4, new dx, continue synthroid 50 and recheck TSH in 4 weeks.      Melena/hematochezia  Patient with episode of melena/hematochezia 7/31, no further episodes since then. GI consulted, no procedure indicated at this time. Continue PPI. [address] on hold for now.      CAD/ TIA continue Aspirin, Plavix, and atorvastatin.       COPD, suspect mild acute on chronic exac  Acute hypoxemic resp failure  Continue with Arnuity ellipta and duoneb.         CKD4  Anemia, likely from renal failure  At baseline. No prior labs seen, but stage CKD 4 documented in prior notes (H&P 11/16)          Hx throat cancer      Dementia , continue aricept      Depression / anxiety, continue zoloft      Debility  PT/OT following. SNF at discharge    Discharge Exam:  Patient seen and examined by me on discharge day. Pertinent Findings:  Visit Vitals    /83 (BP 1 Location: Left arm, BP Patient Position: At rest;Supine)    Pulse 92    Temp 98.3 °F (36.8 °C)    Resp 20    Ht 5' 6\" (1.676 m)    Wt 63.3 kg (139 lb 9.6 oz)    SpO2 99%    BMI 22.53 kg/m2     Gen:    Not in distress  Chest: Clear lungs  CVS:   Regular rhythm. No edema  Abd:  Soft, not distended, not tender    Discharge/Recent Laboratory Results:  Recent Labs      08/10/18   0441   NA  142   K  3.4*   CL  108   CO2  26   BUN  23*   GLU  82   CA  8.8     Recent Labs      08/09/18   0420   HGB  9.8*   HCT  34.0*   WBC  12.8*   PLT  372       Discharge Medications:  Current Discharge Medication List      START taking these medications    Details   levothyroxine (SYNTHROID) 50 mcg tablet Take 1 Tab by mouth every morning. Qty: 30 Tab, Refills: 0      midodrine (PROAMITINE) 10 mg tablet Take 1 Tab by mouth Before breakfast, lunch, and dinner for 30 days.   Qty: 90 Tab, Refills: 0      vancomycin 50 mg/mL oral solution (compounded) Take 2.5 mL by mouth every six (6) hours for 5 days. Qty: 50 mL, Refills: 0         CONTINUE these medications which have CHANGED    Details   furosemide (LASIX) 40 mg tablet Take 1 Tab by mouth daily. Qty: 30 Tab, Refills: 0      metoprolol tartrate (LOPRESSOR) 100 mg IR tablet Take 1 Tab by mouth two (2) times a day. Qty: 60 Tab, Refills: 0         CONTINUE these medications which have NOT CHANGED    Details   amiodarone (CORDARONE) 200 mg tablet Take 200 mg by mouth two (2) times a day. finasteride (PROSCAR) 5 mg tablet Take 5 mg by mouth daily. aspirin 81 mg chewable tablet Take 81 mg by mouth daily. budesonide (PULMICORT FLEXHALER) 90 mcg/actuation aepb inhaler Take 2 Puffs by inhalation. albuterol (PROVENTIL HFA, VENTOLIN HFA, PROAIR HFA) 90 mcg/actuation inhaler Take 2 Puffs by inhalation every six (6) hours as needed for Wheezing. ferrous sulfate 325 mg (65 mg iron) tablet Take  by mouth Daily (before breakfast). gabapentin (NEURONTIN) 600 mg tablet Take 1 Tab by mouth four (4) times daily. Qty: 360 Tab, Refills: 3    Associated Diagnoses: Dizziness and giddiness; Dementia of the Alzheimer's type, with late onset, uncomplicated; Stenosis of both carotid arteries without infarction; Vertebro-basilar artery syndrome      loperamide (IMMODIUM) 2 mg tablet Take 2 mg by mouth four (4) times daily as needed for Diarrhea. 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 sertraline (ZOLOFT) 25 mg tablet Take 25 mg by mouth nightly. Associated Diagnoses: Alzheimer's disease; GBS (Guillain [place] syndrome) ([facility] [address] Depression      omeprazole (PRILOSEC) 20 mg capsule Take 20 mg by mouth daily (after lunch). potassium chloride SR (KLOR-CON 10) 10 mEq tablet Take 10 mEq by mouth two (2) times a day. desloratadine (CLARINEX) 5 mg tablet Take 5 mg by mouth daily.                DISPOSITION:    Home with Family:    Home with HH/PT/OT/RN:    SNF/LTC: [facility]   VINCENT:    OTHER:          Follow up with:   PCP : Ivan Deleon MD  Follow-up Information     Follow up With Details Comments Contact Info    Ivan Deleon MD In 5 days  [address]      Shawn Morales MD In 2 weeks  [address]  [id number]  [facility] [address]  [phone]      [address] [place] [address]  Admission to [facility] for short term rehab [facility]. [phone]          Code: Full  Diet: cardiac    Total time in minutes spent coordinating this discharge (includes going over instructions, follow-up, prescriptions, and preparing report for sign off to her PCP) :  35 minutes

## 2018-08-10 NOTE — PROGRESS NOTES
Problem: Falls - Risk of  Goal: *Absence of Falls  Document Bony Fall Risk and appropriate interventions in the flowsheet.    Outcome: Progressing Towards Goal  Fall Risk Interventions:  Mobility Interventions: Bed/chair exit alarm, OT consult for ADLs, PT Consult for assist device competence, PT Consult for mobility concerns    Mentation Interventions: Bed/chair exit alarm    Medication Interventions: Patient to call before getting OOB, Teach patient to arise slowly    Elimination Interventions: Call light in reach    History of Falls Interventions: Bed/chair exit alarm

## 2018-08-11 NOTE — PROGRESS NOTES
Report called to Caitlin Mead at [facility]. Discharge summary, discharge instructions, MAR summary, and Kardex sent with patient.

## 2019-08-29 NOTE — PROGRESS NOTES
Bedside shift change report given to Caitlin Cueto RN (oncoming nurse). Report included the following information SBAR, Kardex, Intake/Output, MAR and Recent Results. SHIFT SUMMARY:            [facility] NURSING NOTE   Admission Date 7/27/2018   Admission Diagnosis SOB (shortness of breath) on exertion   Consults IP CONSULT TO GASTROENTEROLOGY      Cardiac Monitoring [x] Yes [] No      Purposeful Hourly Rounding [x] Yes    Bony Score Total Score: 3   Bony score 3 or > [x] Bed Alarm [] Avasys [] 1:1 sitter [] Patient refused (Signed refusal form in chart)   Bharathi Score Bharathi Score: 18   Bharathi score 14 or < [] PMT consult [] Wound Care consult    []  Specialty bed  [] Nutrition consult      Influenza Vaccine Received Flu Vaccine for Current Season (usually Sept-March): Not Flu Season           Oxygen needs? [] Room air Oxygen @  [x]1L    []2L    []3L   []4L    []5L   []6L via  NC   Chronic home O2 use? [] Yes [x] No  Perform O2 challenge test and document in progress note using smartphMotionlofte (.Homeoxygen)      Last bowel movement Last Bowel Movement Date: 08/04/18      Urinary Catheter [REMOVED] Urinary Catheter 07/30/18-Indications for Use: Surgery  [REMOVED] Condom Catheter 07/30/18-Indications for Use:  (accidnetal removal)     [REMOVED] Condom Catheter 07/30/18-Urine Output (mL): 275 ml     LDAs               Peripheral IV 07/30/18 Left Antecubital (Active)   Site Assessment Clean, dry, & intact 8/5/2018  7:21 PM   Phlebitis Assessment 0 8/5/2018  7:21 PM   Infiltration Assessment 0 8/5/2018  7:21 PM   Dressing Status Clean, dry, & intact 8/5/2018  7:21 PM   Dressing Type Transparent 8/5/2018  7:21 PM   Hub Color/Line Status Capped 8/5/2018  7:21 PM   Action Taken Blood drawn 7/30/2018  3:35 AM                         Readmission Risk Assessment Tool Score High Risk            22       Total Score        3 Has Seen PCP in Last 6 Months (Yes=3, No=0)    2 . Living with Significant Other. Assisted Living. LTAC. SNF. or   Rehab    3 Patient Length of Stay (>5 days = 3)    4 IP Visits Last 12 Months (1-3=4, 4=9, >4=11)    5 Pt.  Coverage (Medicare=5 , Medicaid, or Self-Pay=4)    5 Charlson Comorbidity Score (Age + Comorbid Conditions)       Expected Length of Stay 3d 7h   Actual Length of Stay 9 rash